# Patient Record
Sex: FEMALE | Race: WHITE | NOT HISPANIC OR LATINO | Employment: OTHER | URBAN - METROPOLITAN AREA
[De-identification: names, ages, dates, MRNs, and addresses within clinical notes are randomized per-mention and may not be internally consistent; named-entity substitution may affect disease eponyms.]

---

## 2018-05-01 ENCOUNTER — OFFICE VISIT (OUTPATIENT)
Dept: FAMILY MEDICINE CLINIC | Facility: CLINIC | Age: 58
End: 2018-05-01
Payer: COMMERCIAL

## 2018-05-01 VITALS
TEMPERATURE: 98.2 F | DIASTOLIC BLOOD PRESSURE: 86 MMHG | SYSTOLIC BLOOD PRESSURE: 132 MMHG | BODY MASS INDEX: 27.42 KG/M2 | WEIGHT: 149 LBS | HEIGHT: 62 IN | HEART RATE: 72 BPM | OXYGEN SATURATION: 99 %

## 2018-05-01 DIAGNOSIS — Z76.89 ENCOUNTER TO ESTABLISH CARE: Primary | ICD-10-CM

## 2018-05-01 DIAGNOSIS — Z13.220 LIPID SCREENING: ICD-10-CM

## 2018-05-01 DIAGNOSIS — E03.8 OTHER SPECIFIED HYPOTHYROIDISM: ICD-10-CM

## 2018-05-01 DIAGNOSIS — Z11.59 NEED FOR HEPATITIS C SCREENING TEST: ICD-10-CM

## 2018-05-01 DIAGNOSIS — Z12.11 SCREENING FOR COLON CANCER: ICD-10-CM

## 2018-05-01 PROCEDURE — 3008F BODY MASS INDEX DOCD: CPT | Performed by: FAMILY MEDICINE

## 2018-05-01 PROCEDURE — 99203 OFFICE O/P NEW LOW 30 MIN: CPT | Performed by: FAMILY MEDICINE

## 2018-05-01 RX ORDER — LEVOTHYROXINE SODIUM 0.07 MG/1
37.5 TABLET ORAL DAILY
Qty: 45 TABLET | Refills: 3 | Status: SHIPPED | OUTPATIENT
Start: 2018-05-01 | End: 2022-03-09 | Stop reason: SDUPTHER

## 2018-05-01 RX ORDER — LEVOTHYROXINE SODIUM 0.07 MG/1
37.5 TABLET ORAL
COMMUNITY
Start: 2016-07-28 | End: 2018-05-01 | Stop reason: SDUPTHER

## 2018-05-01 NOTE — PATIENT INSTRUCTIONS
Hypothyroidism   AMBULATORY CARE:   Hypothyroidism  is a condition that develops when the thyroid gland does not make enough thyroid hormone  Thyroid hormones help control body temperature, heart rate, growth, and weight  Common signs and symptoms include the following: The signs and symptoms may develop slowly, sometimes over several years  · Exhaustion    · Sensitivity to cold    · Headaches or decreased concentration    · Muscle aches or weakness    · Constipation     · Dry, flaky skin or brittle nails    · Thinning hair    · Heavy or irregular monthly periods    · Depression or irritability  Call 911 for any of the following:   · You have sudden chest pain or shortness of breath  · You have a seizure  · You feel like you are going to faint  Seek care immediately if:   · You have diarrhea, tremors, or trouble sleeping  · Your legs, ankles, or feet are swollen  Contact your healthcare provider if:   · You have a fever  · You have chills, a cough, or feel weak and achy  · You have pain and swelling in your muscles and joints  · Your skin is itchy, swollen, or you have a rash  · Your signs and symptoms return or get worse, even after treatment  · You have questions or concerns about your condition or care  Treatment:  Thyroid hormone replacement medicine may bring your thyroid hormone level back to normal  Ask your healthcare provider for more information on other medicines you may need  Follow up with your healthcare provider as directed: You may need to return for more blood tests to check your thyroid hormone level  This will show if you are getting the right amount of thyroid medicine  Write down your questions so you remember to ask them during your visits  © 2017 [employer]  Information is for End User's use only and may not be sold, redistributed or otherwise used for commercial purposes   All illustrations and images included in CareNotes® are the copyrighted property of [employer]  or [employer]  The above information is an  only  It is not intended as medical advice for individual conditions or treatments  Talk to your doctor, nurse or pharmacist before following any medical regimen to see if it is safe and effective for you

## 2018-05-01 NOTE — PROGRESS NOTES
Assessment/Plan:     Diagnoses and all orders for this visit:    Encounter to establish care    Other specified hypothyroidism  -     TSH, 3rd generation; Future  -     Lipid Panel with Direct LDL reflex; Future  -     Comprehensive metabolic panel; Future  -     levothyroxine 75 mcg tablet; Take 0 5 tablets (37 5 mcg total) by mouth daily    Lipid screening  -     Lipid Panel with Direct LDL reflex; Future  -     Comprehensive metabolic panel; Future    Screening for colon cancer  -     Ambulatory referral to Gastroenterology; Future    Need for hepatitis C screening test  -     Hepatitis C antibody; Future    Other orders  -     Discontinue: levothyroxine 75 mcg tablet; Take 37 5 mcg by mouth        Update labs  Continue current dose of thyroid medication  Referral for colonoscopy  Subjective:      Patient ID: Wilfrido Nicholson is a 56 y o  female  New patient to establish care  She has a history of hypothyroidism which was diagnosed years ago after her pregnancy  She is on 37 5 mcg of levothyroxine  She denies any symptoms of hypo or hyperthyroidism  She needs to have a colonoscopy  She had 1 years ago but she states she is due for another one  She sees a gynecologist for Pap smears and mammograms  Thyroid Problem   Presents for initial visit  Patient reports no anxiety, cold intolerance, constipation, diarrhea, fatigue, heat intolerance or palpitations  The symptoms have been stable  Past treatments include levothyroxine  There is no history of atrial fibrillation, dementia, diabetes, Graves' ophthalmopathy, heart failure, hyperlipidemia, neuropathy, obesity or osteopenia  There are no known risk factors  The following portions of the patient's history were reviewed and updated as appropriate: She  has a past medical history of Disease of thyroid gland    She   Patient Active Problem List    Diagnosis Date Noted    Lipid screening 05/01/2018    Need for hepatitis C screening test 05/01/2018    Other specified hypothyroidism 07/21/2016     She  has a past surgical history that includes Hysterectomy  Her family history includes Breast cancer in her mother; Cancer in her sister; Lymphoma in her brother  She  reports that she has quit smoking  Her smoking use included Cigarettes  She quit after 3 00 years of use  She has never used smokeless tobacco  She reports that she drinks about 1 2 oz of alcohol per week   She reports that she does not use drugs  Current Outpatient Prescriptions   Medication Sig Dispense Refill    levothyroxine 75 mcg tablet Take 0 5 tablets (37 5 mcg total) by mouth daily 45 tablet 3     No current facility-administered medications for this visit  No current outpatient prescriptions on file prior to visit  No current facility-administered medications on file prior to visit  She has No Known Allergies       Review of Systems   Constitutional: Negative for activity change, appetite change, chills, fatigue, fever and unexpected weight change  HENT: Negative for congestion, ear discharge, ear pain, postnasal drip, sinus pressure and sore throat  Eyes: Negative for discharge and visual disturbance  Respiratory: Negative for cough, shortness of breath and wheezing  Cardiovascular: Negative for chest pain, palpitations and leg swelling  Gastrointestinal: Negative for abdominal pain, constipation, diarrhea, nausea and vomiting  Endocrine: Negative for cold intolerance, heat intolerance, polydipsia and polyuria  Genitourinary: Negative for difficulty urinating and frequency  Musculoskeletal: Negative for arthralgias, back pain, joint swelling and myalgias  Skin: Negative for rash  Neurological: Negative for dizziness, weakness, light-headedness, numbness and headaches  Hematological: Negative for adenopathy  Psychiatric/Behavioral: Negative for behavioral problems, confusion, dysphoric mood, sleep disturbance and suicidal ideas   The patient is not nervous/anxious  Objective:      /86   Pulse 72   Temp 98 2 °F (36 8 °C)   Ht 5' 2" (1 575 m)   Wt 67 6 kg (149 lb)   SpO2 99%   BMI 27 25 kg/m²          Physical Exam   Constitutional: She is oriented to person, place, and time  She appears well-developed and well-nourished  No distress  HENT:   Head: Normocephalic and atraumatic  Eyes: Conjunctivae are normal  Pupils are equal, round, and reactive to light  Neck: Neck supple  No thyromegaly present  Cardiovascular: Normal rate, regular rhythm and normal heart sounds  Exam reveals no gallop and no friction rub  No murmur heard  Pulmonary/Chest: Effort normal and breath sounds normal  No respiratory distress  She has no wheezes  She has no rales  She exhibits no tenderness  Musculoskeletal: Normal range of motion  She exhibits no edema  Lymphadenopathy:     She has no cervical adenopathy  Neurological: She is alert and oriented to person, place, and time  Skin: Skin is warm and dry  No rash noted  She is not diaphoretic  Psychiatric: She has a normal mood and affect  Her behavior is normal  Judgment and thought content normal    Nursing note and vitals reviewed

## 2018-05-11 LAB
ALBUMIN SERPL-MCNC: 4.5 G/DL (ref 3.5–5.5)
ALBUMIN/GLOB SERPL: 2.1 {RATIO} (ref 1.2–2.2)
ALP SERPL-CCNC: 79 IU/L (ref 39–117)
ALT SERPL-CCNC: 31 IU/L (ref 0–32)
AMBIG ABBREV DEFAULT: NORMAL
AMBIG ABBREV DEFAULT: NORMAL
AST SERPL-CCNC: 31 IU/L (ref 0–40)
BILIRUB SERPL-MCNC: 0.7 MG/DL (ref 0–1.2)
BUN SERPL-MCNC: 17 MG/DL (ref 6–24)
BUN/CREAT SERPL: 23 (ref 9–23)
CALCIUM SERPL-MCNC: 9.3 MG/DL (ref 8.7–10.2)
CHLORIDE SERPL-SCNC: 99 MMOL/L (ref 96–106)
CHOLEST SERPL-MCNC: 201 MG/DL (ref 100–199)
CO2 SERPL-SCNC: 26 MMOL/L (ref 18–29)
CREAT SERPL-MCNC: 0.74 MG/DL (ref 0.57–1)
GLOBULIN SER-MCNC: 2.1 G/DL (ref 1.5–4.5)
GLUCOSE SERPL-MCNC: 86 MG/DL (ref 65–99)
HDLC SERPL-MCNC: 70 MG/DL
LDLC SERPL CALC-MCNC: 117 MG/DL (ref 0–99)
LDLC SERPL DIRECT ASSAY-MCNC: 126 MG/DL (ref 0–99)
POTASSIUM SERPL-SCNC: 3.9 MMOL/L (ref 3.5–5.2)
PROT SERPL-MCNC: 6.6 G/DL (ref 6–8.5)
SL AMB EGFR AFRICAN AMERICAN: 105 ML/MIN/1.73
SL AMB EGFR NON AFRICAN AMERICAN: 91 ML/MIN/1.73
SL AMB VLDL CHOLESTEROL CALC: 14 MG/DL (ref 5–40)
SODIUM SERPL-SCNC: 140 MMOL/L (ref 134–144)
TRIGL SERPL-MCNC: 68 MG/DL (ref 0–149)
TSH SERPL DL<=0.005 MIU/L-ACNC: 4.5 UIU/ML (ref 0.45–4.5)

## 2018-05-15 ENCOUNTER — OFFICE VISIT (OUTPATIENT)
Dept: OBGYN CLINIC | Age: 58
End: 2018-05-15
Payer: COMMERCIAL

## 2018-05-15 VITALS
BODY MASS INDEX: 25.03 KG/M2 | WEIGHT: 136 LBS | DIASTOLIC BLOOD PRESSURE: 72 MMHG | HEIGHT: 62 IN | SYSTOLIC BLOOD PRESSURE: 120 MMHG

## 2018-05-15 DIAGNOSIS — Z12.31 ENCOUNTER FOR SCREENING MAMMOGRAM FOR MALIGNANT NEOPLASM OF BREAST: ICD-10-CM

## 2018-05-15 DIAGNOSIS — Z01.419 ENCOUNTER FOR GYNECOLOGICAL EXAMINATION: Primary | ICD-10-CM

## 2018-05-15 PROCEDURE — 99386 PREV VISIT NEW AGE 40-64: CPT | Performed by: OBSTETRICS & GYNECOLOGY

## 2018-05-15 NOTE — PATIENT INSTRUCTIONS
Mammogram   AMBULATORY CARE:   What you need to know about a mammogram:  A mammogram is an x-ray of your breasts to screen for breast cancer  Experts recommend mammograms every 2 years starting at age 48 years  You may need a mammogram at age 52 years or younger if you have an increased risk for breast cancer  Talk to your healthcare provider about when you should start having mammograms and how often you need them  How to prepare for a mammogram:   · Do not use deodorant, powder, lotion, or perfume  These products may cause particles to appear on your mammogram      · Wear a 2-piece outfit  · If your breasts are tender before your monthly period, do not have a mammogram during this time  Schedule your mammogram to be done 1 week after your period ends  · If you are breastfeeding, express as much milk as possible before the mammogram     · Bring a list of the dates and places of your past mammograms and other breast tests or treatments  What will happen during a mammogram:  A top view and a side view x-ray are usually done for each breast  Tell healthcare providers if you have breast implants or breast problems before you have your mammogram  You may need extra x-rays of each breast   · You will be given a hospital gown  Take off your clothes from the waist up  Wear the hospital gown so that it opens in the front  · You will sit or stand next to a small x-ray machine  The healthcare provider will help you place one of your breasts on the x-ray plate  Your arm and breast will be moved until your breast is in the correct position  · Your breast will be gently pressed between 2 plastic plates for a few seconds while the x-ray is taken  This may be uncomfortable  · You will be asked to hold your breath while the x-ray is taken  Another x-ray will be taken of the same breast after the position of the x-ray machine has been changed  · Your other breast will be x-rayed the same way    What will happen after a mammogram:  Your breasts may feel tender for a short while after the mammogram  You may resume your regular activities  Ask your healthcare provider when you should receive the results of your mammogram   Risks of a mammogram:  You will be exposed to a small amount of radiation  Some breast cancers may not show up on mammograms  Contact your healthcare provider if:   · You cannot make your appointment on time  · You do not receive your results when expected  · You have questions or concerns about the mammogram   Follow up with your healthcare provider as directed:  Write down your questions so you remember to ask them during your visits  © 2017 [employer]  Information is for End User's use only and may not be sold, redistributed or otherwise used for commercial purposes  All illustrations and images included in CareNotes® are the copyrighted property of [employer]  or [employer]  The above information is an  only  It is not intended as medical advice for individual conditions or treatments  Talk to your doctor, nurse or pharmacist before following any medical regimen to see if it is safe and effective for you

## 2018-05-15 NOTE — PROGRESS NOTES
Assessment/Plan:    Encounter for gynecological examination  Pap not indicated  Mammogram ordered  GI referral given by PCP    Encourage healthy diet, exercise, Calcium 1200mg per day and at least 800 iu Vitamin D daily  Diagnoses and all orders for this visit:    Encounter for gynecological examination    Encounter for screening mammogram for malignant neoplasm of breast  -     Mammo screening bilateral w cad; Future          Subjective:      Patient ID: Krys Ravi is a 56 y o  female  Patient here for new patient yearly exam   Age of first period 18yrs old    lmp: hysterectomy ()  Last pap: 3/2017 per pt negative   Last mammo: 3/2017 per pt negative   Pt is not a smoker  Pt is social drinker  Pt does exersice    x 35 years  He has diabetes and parkinsons  Mora Silverman's mother  Gynecologic Exam   The patient's pertinent negatives include no genital itching, genital lesions, genital odor, genital rash, pelvic pain, vaginal bleeding or vaginal discharge  The patient is experiencing no pain  Pertinent negatives include no chills, constipation, diarrhea, fever, frequency, nausea, sore throat, urgency or vomiting  She is not sexually active  She is postmenopausal  Her past medical history is significant for a gynecological surgery  The following portions of the patient's history were reviewed and updated as appropriate:   She  has a past medical history of Arthritis; Disease of thyroid gland; Thyroid disease; and Uterus cancer ([facility] [place] [address] )  She   Patient Active Problem List    Diagnosis Date Noted    Encounter for gynecological examination 05/15/2018    Lipid screening 2018    Need for hepatitis C screening test 2018    Other specified hypothyroidism 2016     She  has a past surgical history that includes Hysterectomy    Her family history includes Breast cancer in her mother; Cancer in her sister; Lymphoma in her brother; Melanoma in her brother  She  reports that she has quit smoking  Her smoking use included Cigarettes  She quit after 3 00 years of use  She has never used smokeless tobacco  She reports that she drinks about 1 2 oz of alcohol per week   She reports that she does not use drugs  Current Outpatient Prescriptions   Medication Sig Dispense Refill    levothyroxine 75 mcg tablet Take 0 5 tablets (37 5 mcg total) by mouth daily (Patient taking differently: Take 35 mcg by mouth daily  ) 45 tablet 3     No current facility-administered medications for this visit  Current Outpatient Prescriptions on File Prior to Visit   Medication Sig    levothyroxine 75 mcg tablet Take 0 5 tablets (37 5 mcg total) by mouth daily (Patient taking differently: Take 35 mcg by mouth daily  )     No current facility-administered medications on file prior to visit  She has No Known Allergies       Review of Systems   Constitutional: Negative for activity change, appetite change, chills, fatigue and fever  HENT: Negative for rhinorrhea, sneezing and sore throat  Eyes: Negative for visual disturbance  Respiratory: Negative for cough, shortness of breath and wheezing  Cardiovascular: Negative for chest pain, palpitations and leg swelling  Gastrointestinal: Negative for abdominal distention, constipation, diarrhea, nausea and vomiting  Genitourinary: Negative for difficulty urinating, frequency, pelvic pain, urgency and vaginal discharge  Neurological: Negative for syncope and light-headedness  Objective:      /72 (BP Location: Right arm, Patient Position: Sitting, Cuff Size: Standard)   Ht 5' 2" (1 575 m)   Wt 61 7 kg (136 lb)   LMP  (LMP Unknown)   BMI 24 87 kg/m²          Physical Exam   Genitourinary: No breast swelling, tenderness, discharge or bleeding  There is no rash, tenderness, lesion or injury on the right labia  There is no rash, tenderness, lesion or injury on the left labia   Right adnexum displays no mass, no tenderness and no fullness  Left adnexum displays no mass, no tenderness and no fullness  No bleeding in the vagina  No vaginal discharge found

## 2018-05-15 NOTE — ASSESSMENT & PLAN NOTE
Pap not indicated  Mammogram ordered  GI referral given by PCP    Encourage healthy diet, exercise, Calcium 1200mg per day and at least 800 iu Vitamin D daily

## 2018-09-04 ENCOUNTER — HOSPITAL ENCOUNTER (OUTPATIENT)
Dept: RADIOLOGY | Facility: MEDICAL CENTER | Age: 58
Discharge: HOME | End: 2018-09-04
Payer: COMMERCIAL

## 2018-09-04 DIAGNOSIS — Z12.31 ENCOUNTER FOR SCREENING MAMMOGRAM FOR MALIGNANT NEOPLASM OF BREAST: ICD-10-CM

## 2018-09-04 PROCEDURE — 77067 SCR MAMMO BI INCL CAD: CPT

## 2019-10-08 ENCOUNTER — ANNUAL EXAM (OUTPATIENT)
Dept: OBGYN CLINIC | Facility: CLINIC | Age: 59
End: 2019-10-08
Payer: COMMERCIAL

## 2019-10-08 VITALS
WEIGHT: 137.13 LBS | HEIGHT: 62 IN | DIASTOLIC BLOOD PRESSURE: 80 MMHG | BODY MASS INDEX: 25.23 KG/M2 | SYSTOLIC BLOOD PRESSURE: 110 MMHG | HEART RATE: 69 BPM

## 2019-10-08 DIAGNOSIS — Z01.419 ENCOUNTER FOR GYNECOLOGICAL EXAMINATION: Primary | ICD-10-CM

## 2019-10-08 DIAGNOSIS — Z12.31 ENCOUNTER FOR SCREENING MAMMOGRAM FOR MALIGNANT NEOPLASM OF BREAST: ICD-10-CM

## 2019-10-08 DIAGNOSIS — R92.2 DENSE BREAST: ICD-10-CM

## 2019-10-08 DIAGNOSIS — Z12.11 SCREENING FOR COLON CANCER: ICD-10-CM

## 2019-10-08 PROCEDURE — 99396 PREV VISIT EST AGE 40-64: CPT | Performed by: OBSTETRICS & GYNECOLOGY

## 2019-10-08 NOTE — PROGRESS NOTES
Assessment/Plan:    No problem-specific Assessment & Plan notes found for this encounter  Diagnoses and all orders for this visit:    Encounter for gynecological examination    Encounter for screening mammogram for malignant neoplasm of breast  -     Mammo screening bilateral w 3d & cad; Future    Dense breast  -     Mammo screening bilateral w 3d & cad; Future          Subjective:      Patient ID: Osiris Wagoner is a 64 y o  female  Patient here for yearly exam  No current complaints at this time  Age of first period: 17years old    Lmp: pt had hysterectomy  Last mammo: 18 BR2 Benign  Last colonoscopy: had over 20 years ago per pt  referral printed for pt  Patient is a former smoker  Patient is a social drinker  Patient exercises   - Parkinson's    Parent's are local - Mother just diagnosed with Melanoma  Otis Deleon does her wound care  Gynecologic Exam   The patient's pertinent negatives include no genital itching, genital lesions, genital odor, genital rash, pelvic pain, vaginal bleeding or vaginal discharge  The patient is experiencing no pain  Pertinent negatives include no chills, constipation, diarrhea, fever, frequency, nausea, sore throat, urgency or vomiting  She is not sexually active  The following portions of the patient's history were reviewed and updated as appropriate:   She  has a past medical history of Arthritis, Disease of thyroid gland, Thyroid disease, and Uterus cancer ([facility] [address] )  She   Patient Active Problem List    Diagnosis Date Noted    Encounter for gynecological examination 05/15/2018    Lipid screening 2018    Need for hepatitis C screening test 2018    Other specified hypothyroidism 2016     She  has a past surgical history that includes Hysterectomy  Her family history includes Breast cancer in her mother; Cancer in her sister; Lymphoma in her brother; Melanoma in her brother  She  reports that she has quit smoking   Her smoking use included cigarettes  She quit after 3 00 years of use  She has never used smokeless tobacco  She reports that she drinks about 2 0 standard drinks of alcohol per week  She reports that she does not use drugs  Current Outpatient Medications   Medication Sig Dispense Refill    levothyroxine 75 mcg tablet Take 0 5 tablets (37 5 mcg total) by mouth daily (Patient taking differently: Take 35 mcg by mouth daily  ) 45 tablet 3     No current facility-administered medications for this visit  Current Outpatient Medications on File Prior to Visit   Medication Sig    levothyroxine 75 mcg tablet Take 0 5 tablets (37 5 mcg total) by mouth daily (Patient taking differently: Take 35 mcg by mouth daily  )     No current facility-administered medications on file prior to visit  She has No Known Allergies       Review of Systems   Constitutional: Negative for activity change, appetite change, chills, fatigue and fever  HENT: Negative for rhinorrhea, sneezing and sore throat  Eyes: Negative for visual disturbance  Respiratory: Negative for cough, shortness of breath and wheezing  Cardiovascular: Negative for chest pain, palpitations and leg swelling  Gastrointestinal: Negative for abdominal distention, constipation, diarrhea, nausea and vomiting  Genitourinary: Negative for difficulty urinating, frequency, pelvic pain, urgency and vaginal discharge  Neurological: Negative for syncope and light-headedness  Objective:      LMP  (LMP Unknown)   Breastfeeding? No          Physical Exam   Constitutional: She appears well-developed and well-nourished  No distress  Pulmonary/Chest: Right breast exhibits no inverted nipple, no mass, no nipple discharge, no skin change and no tenderness  Left breast exhibits no inverted nipple, no mass, no nipple discharge, no skin change and no tenderness  No breast tenderness, discharge or bleeding  Breasts are symmetrical    Abdominal: Soft  Normal appearance  There is no tenderness  There is no rigidity, no rebound and no guarding  Hernia confirmed negative in the right inguinal area and confirmed negative in the left inguinal area  Genitourinary: No breast tenderness, discharge or bleeding  There is no rash, tenderness, lesion or injury on the right labia  There is no rash, tenderness, lesion or injury on the left labia  Right adnexum displays no mass, no tenderness and no fullness  Left adnexum displays no mass, no tenderness and no fullness  No bleeding in the vagina  No vaginal discharge found  Genitourinary Comments: Urethral meatus: no lesions, non tender  Urethra: non tender    Vaginal mucosa atrophic   Lymphadenopathy:        Right: No inguinal adenopathy present  Left: No inguinal adenopathy present  Skin: Skin is warm and dry  No rash noted  She is not diaphoretic  No erythema  No pallor  Psychiatric: She has a normal mood and affect

## 2019-10-08 NOTE — ASSESSMENT & PLAN NOTE
Pap/HPV not indicated  Mammogram ordered  Colonoscopy referral given    Encourage healthy diet, exercise, Calcium 1200mg per day and at least 800 iu Vitamin D daily

## 2019-10-08 NOTE — PATIENT INSTRUCTIONS

## 2019-11-15 ENCOUNTER — TELEPHONE (OUTPATIENT)
Dept: GASTROENTEROLOGY | Facility: CLINIC | Age: 59
End: 2019-11-15

## 2019-11-15 NOTE — TELEPHONE ENCOUNTER
11/15/19  Screened by: Andra Silva    Referring Provider mariana khoury    Pre- Screening: There is no height or weight on file to calculate BMI  Has patient been referred for a routine screening Colonoscopy? yes  Is the patient between 39-70 years old? yes    SCHEDULING STAFF   If the patient is between 45yrs-49yrs, please advise patient to confirm benefits/coverage with their insurance company for a routine screening colonoscopy, some insurance carriers will only cover at [phone] or older   If the patient is over 66years old, please schedule an office visit  Do you have any of the following symptoms?  no    Have you had a coronary or vascular stent within the last year? no    Have you had a heart attack or stroke in the last 6 months? no    Have you had intestinal surgery in the last 3 months? no    Do you have problems with: no    Do you use: no    Have you been hospitalized in the last Month? no    Have you been diagnosed with a bleeding disorder or anemia? no    Have you had chest pain (angina) or breathing problems  (COPD) in the last 3 months? no    Do you have any difficulty walking up a flight of stairs? no    Have you had Kidney failure or insufficiency? no    Have you had heart valve surgery? no    Are you confined to a wheelchair? no    Do you take no    Have you been diagnosed with Diabetes or are you taking any   Diabetic medications? no    : If patient answers NO to medical questions, then schedule procedure  If patient answers YES to medical questions, then schedule office appointment  Previous Colonoscopy yes  Date and Facility of last colonoscopy?  20 years ago    Comments:

## 2019-12-04 ENCOUNTER — HOSPITAL ENCOUNTER (OUTPATIENT)
Dept: RADIOLOGY | Facility: MEDICAL CENTER | Age: 59
Discharge: HOME | End: 2019-12-04
Payer: COMMERCIAL

## 2019-12-04 VITALS — WEIGHT: 137 LBS | BODY MASS INDEX: 25.21 KG/M2 | HEIGHT: 62 IN

## 2019-12-04 DIAGNOSIS — R92.2 DENSE BREAST: ICD-10-CM

## 2019-12-04 DIAGNOSIS — Z12.31 ENCOUNTER FOR SCREENING MAMMOGRAM FOR MALIGNANT NEOPLASM OF BREAST: ICD-10-CM

## 2019-12-04 PROCEDURE — 77067 SCR MAMMO BI INCL CAD: CPT

## 2019-12-04 PROCEDURE — 77063 BREAST TOMOSYNTHESIS BI: CPT

## 2020-10-28 ENCOUNTER — ANNUAL EXAM (OUTPATIENT)
Dept: OBGYN CLINIC | Facility: CLINIC | Age: 60
End: 2020-10-28
Payer: COMMERCIAL

## 2020-10-28 VITALS
BODY MASS INDEX: 25.97 KG/M2 | SYSTOLIC BLOOD PRESSURE: 128 MMHG | TEMPERATURE: 98 F | WEIGHT: 142 LBS | DIASTOLIC BLOOD PRESSURE: 80 MMHG

## 2020-10-28 DIAGNOSIS — Z12.31 ENCOUNTER FOR SCREENING MAMMOGRAM FOR MALIGNANT NEOPLASM OF BREAST: ICD-10-CM

## 2020-10-28 DIAGNOSIS — Z01.419 ENCOUNTER FOR GYNECOLOGICAL EXAMINATION: Primary | ICD-10-CM

## 2020-10-28 PROCEDURE — S0612 ANNUAL GYNECOLOGICAL EXAMINA: HCPCS | Performed by: OBSTETRICS & GYNECOLOGY

## 2020-12-30 ENCOUNTER — HOSPITAL ENCOUNTER (OUTPATIENT)
Dept: MAMMOGRAPHY | Facility: HOSPITAL | Age: 60
Discharge: HOME | End: 2020-12-30
Payer: COMMERCIAL

## 2020-12-30 VITALS — WEIGHT: 138 LBS | HEIGHT: 62 IN | BODY MASS INDEX: 25.4 KG/M2

## 2020-12-30 DIAGNOSIS — Z12.31 ENCOUNTER FOR SCREENING MAMMOGRAM FOR MALIGNANT NEOPLASM OF BREAST: ICD-10-CM

## 2020-12-30 PROCEDURE — 77063 BREAST TOMOSYNTHESIS BI: CPT

## 2020-12-30 PROCEDURE — 77067 SCR MAMMO BI INCL CAD: CPT

## 2021-11-03 ENCOUNTER — ANNUAL EXAM (OUTPATIENT)
Dept: OBGYN CLINIC | Facility: CLINIC | Age: 61
End: 2021-11-03
Payer: COMMERCIAL

## 2021-11-03 VITALS — SYSTOLIC BLOOD PRESSURE: 122 MMHG | WEIGHT: 140.4 LBS | DIASTOLIC BLOOD PRESSURE: 80 MMHG | BODY MASS INDEX: 25.68 KG/M2

## 2021-11-03 DIAGNOSIS — Z01.419 ENCOUNTER FOR GYNECOLOGICAL EXAMINATION: ICD-10-CM

## 2021-11-03 DIAGNOSIS — Z12.31 SCREENING MAMMOGRAM, ENCOUNTER FOR: ICD-10-CM

## 2021-11-03 DIAGNOSIS — Z01.419 ENCOUNTER FOR ANNUAL ROUTINE GYNECOLOGICAL EXAMINATION: Primary | ICD-10-CM

## 2021-11-03 PROCEDURE — S0612 ANNUAL GYNECOLOGICAL EXAMINA: HCPCS | Performed by: OBSTETRICS & GYNECOLOGY

## 2022-01-03 ENCOUNTER — HOSPITAL ENCOUNTER (OUTPATIENT)
Dept: MAMMOGRAPHY | Facility: HOSPITAL | Age: 62
Discharge: HOME | End: 2022-01-03
Payer: COMMERCIAL

## 2022-01-03 VITALS — BODY MASS INDEX: 25.76 KG/M2 | HEIGHT: 62 IN | WEIGHT: 140 LBS

## 2022-01-03 DIAGNOSIS — Z12.31 SCREENING MAMMOGRAM, ENCOUNTER FOR: ICD-10-CM

## 2022-01-03 PROCEDURE — 77067 SCR MAMMO BI INCL CAD: CPT

## 2022-01-03 PROCEDURE — 77063 BREAST TOMOSYNTHESIS BI: CPT

## 2022-03-09 ENCOUNTER — OFFICE VISIT (OUTPATIENT)
Dept: FAMILY MEDICINE CLINIC | Facility: CLINIC | Age: 62
End: 2022-03-09
Payer: COMMERCIAL

## 2022-03-09 VITALS
BODY MASS INDEX: 25.76 KG/M2 | SYSTOLIC BLOOD PRESSURE: 130 MMHG | OXYGEN SATURATION: 97 % | HEIGHT: 62 IN | TEMPERATURE: 98.2 F | HEART RATE: 71 BPM | DIASTOLIC BLOOD PRESSURE: 80 MMHG | WEIGHT: 140 LBS

## 2022-03-09 DIAGNOSIS — Z13.220 SCREENING FOR LIPID DISORDERS: ICD-10-CM

## 2022-03-09 DIAGNOSIS — Z13.1 SCREENING FOR DIABETES MELLITUS (DM): Primary | ICD-10-CM

## 2022-03-09 DIAGNOSIS — E03.8 OTHER SPECIFIED HYPOTHYROIDISM: ICD-10-CM

## 2022-03-09 DIAGNOSIS — E03.9 HYPOTHYROIDISM, UNSPECIFIED TYPE: ICD-10-CM

## 2022-03-09 DIAGNOSIS — M06.9 RHEUMATOID ARTHRITIS INVOLVING BOTH HANDS, UNSPECIFIED WHETHER RHEUMATOID FACTOR PRESENT (HCC): ICD-10-CM

## 2022-03-09 PROCEDURE — 1036F TOBACCO NON-USER: CPT | Performed by: PHYSICIAN ASSISTANT

## 2022-03-09 PROCEDURE — 99203 OFFICE O/P NEW LOW 30 MIN: CPT | Performed by: PHYSICIAN ASSISTANT

## 2022-03-09 PROCEDURE — 3008F BODY MASS INDEX DOCD: CPT | Performed by: PHYSICIAN ASSISTANT

## 2022-03-09 PROCEDURE — 3725F SCREEN DEPRESSION PERFORMED: CPT | Performed by: PHYSICIAN ASSISTANT

## 2022-03-09 RX ORDER — LEVOTHYROXINE SODIUM 0.07 MG/1
75 TABLET ORAL DAILY
Qty: 30 TABLET | Refills: 2 | Status: SHIPPED | OUTPATIENT
Start: 2022-03-09 | End: 2022-05-10 | Stop reason: SDUPTHER

## 2022-03-09 NOTE — PROGRESS NOTES
BMI Counseling: Body mass index is 25 61 kg/m²  The BMI is above normal  Nutrition recommendations include reducing portion sizes, decreasing overall calorie intake and 3-5 servings of fruits/vegetables daily  Exercise recommendations include strength training exercises  Assessment/Plan:    No problem-specific Assessment & Plan notes found for this encounter  Diagnoses and all orders for this visit:    Screening for diabetes mellitus (DM)  -     Comprehensive metabolic panel; Future    Screening for lipid disorders  -     Lipid Panel with Direct LDL reflex; Future    Hypothyroidism, unspecified type  -     TSH, 3rd generation with Free T4 reflex; Future    Other specified hypothyroidism  -     levothyroxine 75 mcg tablet; Take 1 tablet (75 mcg total) by mouth daily    Rheumatoid arthritis involving both hands, unspecified whether rheumatoid factor present (HCC)  -     CBC and differential; Future          Subjective:      Patient ID: Joss Alvarado is a 63 y o  female  Joss Alvarado is a 63 y o  female with a medical history of hypothyroidism presents for follow up for labs and to re-establish care  Hx RA- CBD cream at night  Mostly hands now more the knees  Last saw a rheumatologist in 2005        Screenings:  Update labs  Mammogram- yearly and UTD  Cervical screen- patient has hx hysterectomy and has yearly exam  Colonoscopy- had 3 years ago (care gap request) will be due in 2 more years      The following portions of the patient's history were reviewed and updated as appropriate: allergies, past family history, past medical history, past social history, past surgical history and problem list     Review of Systems   Constitutional: Negative for chills, fatigue and fever  HENT: Negative for congestion, ear pain, sinus pain, sore throat and trouble swallowing  Eyes: Negative for pain, discharge and redness  Respiratory: Negative for cough, chest tightness, shortness of breath and wheezing  Cardiovascular: Negative for chest pain, palpitations and leg swelling  Gastrointestinal: Negative for abdominal pain, diarrhea, nausea and vomiting  Musculoskeletal: Negative for arthralgias, joint swelling and myalgias  Skin: Negative for rash  Neurological: Negative for dizziness, weakness, numbness and headaches  Objective:      /80 (BP Location: Left arm, Patient Position: Sitting, Cuff Size: Adult)   Pulse 71   Temp 98 2 °F (36 8 °C)   Ht 5' 2" (1 575 m)   Wt 63 5 kg (140 lb)   LMP  (LMP Unknown)   SpO2 97%   BMI 25 61 kg/m²          Physical Exam  Vitals and nursing note reviewed  Constitutional:       General: She is not in acute distress  Appearance: Normal appearance  She is well-developed  Cardiovascular:      Rate and Rhythm: Normal rate and regular rhythm  Pulmonary:      Effort: Pulmonary effort is normal       Breath sounds: Normal breath sounds  Abdominal:      General: Bowel sounds are normal       Palpations: Abdomen is soft  Abdomen is not rigid  Tenderness: There is no abdominal tenderness  There is no guarding or rebound  Negative signs include Durbin's sign and McBurney's sign  Hernia: No hernia is present  Skin:     General: Skin is warm and dry

## 2022-03-22 ENCOUNTER — APPOINTMENT (OUTPATIENT)
Dept: LAB | Facility: CLINIC | Age: 62
End: 2022-03-22
Payer: COMMERCIAL

## 2022-03-22 DIAGNOSIS — Z13.1 SCREENING FOR DIABETES MELLITUS (DM): ICD-10-CM

## 2022-03-22 DIAGNOSIS — M06.9 RHEUMATOID ARTHRITIS INVOLVING BOTH HANDS, UNSPECIFIED WHETHER RHEUMATOID FACTOR PRESENT (HCC): ICD-10-CM

## 2022-03-22 DIAGNOSIS — Z13.220 SCREENING FOR LIPID DISORDERS: ICD-10-CM

## 2022-03-22 DIAGNOSIS — E03.9 HYPOTHYROIDISM, UNSPECIFIED TYPE: ICD-10-CM

## 2022-03-22 LAB
ALBUMIN SERPL BCP-MCNC: 3.9 G/DL (ref 3.5–5)
ALP SERPL-CCNC: 78 U/L (ref 46–116)
ALT SERPL W P-5'-P-CCNC: 32 U/L (ref 12–78)
ANION GAP SERPL CALCULATED.3IONS-SCNC: 2 MMOL/L (ref 4–13)
AST SERPL W P-5'-P-CCNC: 23 U/L (ref 5–45)
BASOPHILS # BLD AUTO: 0.03 THOUSANDS/ΜL (ref 0–0.1)
BASOPHILS NFR BLD AUTO: 1 % (ref 0–1)
BILIRUB SERPL-MCNC: 0.66 MG/DL (ref 0.2–1)
BUN SERPL-MCNC: 15 MG/DL (ref 5–25)
CALCIUM SERPL-MCNC: 9.2 MG/DL (ref 8.3–10.1)
CHLORIDE SERPL-SCNC: 106 MMOL/L (ref 100–108)
CHOLEST SERPL-MCNC: 200 MG/DL
CO2 SERPL-SCNC: 30 MMOL/L (ref 21–32)
CREAT SERPL-MCNC: 0.68 MG/DL (ref 0.6–1.3)
EOSINOPHIL # BLD AUTO: 0.06 THOUSAND/ΜL (ref 0–0.61)
EOSINOPHIL NFR BLD AUTO: 1 % (ref 0–6)
ERYTHROCYTE [DISTWIDTH] IN BLOOD BY AUTOMATED COUNT: 12.4 % (ref 11.6–15.1)
GFR SERPL CREATININE-BSD FRML MDRD: 96 ML/MIN/1.73SQ M
GLUCOSE P FAST SERPL-MCNC: 86 MG/DL (ref 65–99)
HCT VFR BLD AUTO: 42.5 % (ref 34.8–46.1)
HDLC SERPL-MCNC: 61 MG/DL
HGB BLD-MCNC: 13.8 G/DL (ref 11.5–15.4)
IMM GRANULOCYTES # BLD AUTO: 0.01 THOUSAND/UL (ref 0–0.2)
IMM GRANULOCYTES NFR BLD AUTO: 0 % (ref 0–2)
LDLC SERPL CALC-MCNC: 127 MG/DL (ref 0–100)
LYMPHOCYTES # BLD AUTO: 1.39 THOUSANDS/ΜL (ref 0.6–4.47)
LYMPHOCYTES NFR BLD AUTO: 34 % (ref 14–44)
MCH RBC QN AUTO: 29.5 PG (ref 26.8–34.3)
MCHC RBC AUTO-ENTMCNC: 32.5 G/DL (ref 31.4–37.4)
MCV RBC AUTO: 91 FL (ref 82–98)
MONOCYTES # BLD AUTO: 0.38 THOUSAND/ΜL (ref 0.17–1.22)
MONOCYTES NFR BLD AUTO: 9 % (ref 4–12)
NEUTROPHILS # BLD AUTO: 2.28 THOUSANDS/ΜL (ref 1.85–7.62)
NEUTS SEG NFR BLD AUTO: 55 % (ref 43–75)
NRBC BLD AUTO-RTO: 0 /100 WBCS
PLATELET # BLD AUTO: 176 THOUSANDS/UL (ref 149–390)
PMV BLD AUTO: 10.4 FL (ref 8.9–12.7)
POTASSIUM SERPL-SCNC: 4.1 MMOL/L (ref 3.5–5.3)
PROT SERPL-MCNC: 7.3 G/DL (ref 6.4–8.2)
RBC # BLD AUTO: 4.68 MILLION/UL (ref 3.81–5.12)
SODIUM SERPL-SCNC: 138 MMOL/L (ref 136–145)
TRIGL SERPL-MCNC: 61 MG/DL
TSH SERPL DL<=0.05 MIU/L-ACNC: 1.99 UIU/ML (ref 0.36–3.74)
WBC # BLD AUTO: 4.15 THOUSAND/UL (ref 4.31–10.16)

## 2022-03-22 PROCEDURE — 80053 COMPREHEN METABOLIC PANEL: CPT

## 2022-03-22 PROCEDURE — 36415 COLL VENOUS BLD VENIPUNCTURE: CPT

## 2022-03-22 PROCEDURE — 80061 LIPID PANEL: CPT

## 2022-03-22 PROCEDURE — 85025 COMPLETE CBC W/AUTO DIFF WBC: CPT

## 2022-03-22 PROCEDURE — 84443 ASSAY THYROID STIM HORMONE: CPT

## 2022-05-10 DIAGNOSIS — E03.8 OTHER SPECIFIED HYPOTHYROIDISM: ICD-10-CM

## 2022-05-10 RX ORDER — LEVOTHYROXINE SODIUM 0.07 MG/1
75 TABLET ORAL DAILY
Qty: 90 TABLET | Refills: 1 | Status: SHIPPED | OUTPATIENT
Start: 2022-05-10 | End: 2022-05-11 | Stop reason: SDUPTHER

## 2022-05-11 DIAGNOSIS — E03.8 OTHER SPECIFIED HYPOTHYROIDISM: ICD-10-CM

## 2022-05-11 RX ORDER — LEVOTHYROXINE SODIUM 0.07 MG/1
75 TABLET ORAL DAILY
Qty: 90 TABLET | Refills: 1 | Status: SHIPPED | OUTPATIENT
Start: 2022-05-11 | End: 2022-08-02

## 2022-08-02 DIAGNOSIS — E03.8 OTHER SPECIFIED HYPOTHYROIDISM: ICD-10-CM

## 2022-08-02 RX ORDER — LEVOTHYROXINE SODIUM 0.07 MG/1
75 TABLET ORAL DAILY
Qty: 90 TABLET | Refills: 1 | Status: SHIPPED | OUTPATIENT
Start: 2022-08-02 | End: 2022-10-04 | Stop reason: SDUPTHER

## 2022-10-04 DIAGNOSIS — E03.8 OTHER SPECIFIED HYPOTHYROIDISM: ICD-10-CM

## 2022-10-04 RX ORDER — LEVOTHYROXINE SODIUM 0.07 MG/1
75 TABLET ORAL DAILY
Qty: 90 TABLET | Refills: 1 | Status: SHIPPED | OUTPATIENT
Start: 2022-10-04

## 2022-10-12 PROBLEM — Z01.419 ENCOUNTER FOR GYNECOLOGICAL EXAMINATION: Status: RESOLVED | Noted: 2018-05-15 | Resolved: 2022-10-12

## 2022-11-22 ENCOUNTER — ANNUAL EXAM (OUTPATIENT)
Dept: OBGYN CLINIC | Facility: CLINIC | Age: 62
End: 2022-11-22

## 2022-11-22 VITALS
WEIGHT: 141.8 LBS | BODY MASS INDEX: 26.09 KG/M2 | SYSTOLIC BLOOD PRESSURE: 118 MMHG | HEIGHT: 62 IN | DIASTOLIC BLOOD PRESSURE: 78 MMHG

## 2022-11-22 DIAGNOSIS — Z12.31 ENCOUNTER FOR SCREENING MAMMOGRAM FOR MALIGNANT NEOPLASM OF BREAST: ICD-10-CM

## 2022-11-22 DIAGNOSIS — Z01.419 ENCOUNTER FOR GYNECOLOGICAL EXAMINATION (GENERAL) (ROUTINE) WITHOUT ABNORMAL FINDINGS: Primary | ICD-10-CM

## 2022-11-22 DIAGNOSIS — Z11.51 SCREENING FOR HPV (HUMAN PAPILLOMAVIRUS): ICD-10-CM

## 2022-11-22 NOTE — PROGRESS NOTES
Assessment/Plan:    Encounter for gynecological examination (general) (routine) without abnormal findings  Pap/HPV collected  Mammogram ordered  Colonoscopy: discussed difficulty with finding care for   May try cologaurd    Encourage healthy diet, exercise, Calcium 1200mg per day and at least 800 iu Vitamin D daily  Diagnoses and all orders for this visit:    Encounter for gynecological examination (general) (routine) without abnormal findings  -     Liquid-based pap, screening    Screening for HPV (human papillomavirus)  -     Liquid-based pap, screening    Encounter for screening mammogram for malignant neoplasm of breast  -     Mammo screening bilateral w 3d & cad; Future          Subjective:      Patient ID: Keaton Gomez is a 63 y o  female  Patient presents for a routine annual visit  Menarche-14 Y/O  Last Pap Smear- 3/7/17-neg/neg Due today    Mammogram-1/3/22-normal  Colonoscopy- Per pt, had in 2018  Recall 2023 diverticuliti  [id number]  Non smoker  Social drinker  Currently sexually active  Mother and aunt with breast cancer  No concerns/questions for today's visit  Care for  with parkinsons  And parents live nearby  Gynecologic Exam  She reports no genital itching, genital lesions, genital odor, genital rash, pelvic pain, vaginal bleeding or vaginal discharge  The patient is experiencing no pain  Pertinent negatives include no chills, constipation, diarrhea, dysuria, fever, flank pain, frequency, headaches, hematuria, nausea, rash, sore throat, urgency or vomiting  She is sexually active  The following portions of the patient's history were reviewed and updated as appropriate:   She  has a past medical history of Arthritis, BRCA1 negative, Disease of thyroid gland, Endometrial cancer ([facility] [address] ) (1991), Thyroid disease, and Uterus cancer ([facility] [address] )    She   Patient Active Problem List    Diagnosis Date Noted   • Encounter for gynecological examination (general) (routine) without abnormal findings 11/22/2022   • Lipid screening 05/01/2018   • Need for hepatitis C screening test 05/01/2018   • Other specified hypothyroidism 07/21/2016     She  has a past surgical history that includes Hysterectomy  Her family history includes Breast cancer in her maternal aunt; Breast cancer (age of onset: 48) in her mother; Cancer in her sister; Colon cancer in her paternal uncle; Lymphoma in her brother; Melanoma (age of onset: 48) in her brother; No Known Problems in her daughter, father, maternal grandfather, maternal grandmother, paternal grandfather, paternal grandmother, and son; Skin cancer in her maternal aunt  She  reports that she has quit smoking  Her smoking use included cigarettes  She has never used smokeless tobacco  She reports current alcohol use of about 3 0 standard drinks per week  She reports that she does not use drugs  Current Outpatient Medications   Medication Sig Dispense Refill   • levothyroxine 75 mcg tablet Take 1 tablet (75 mcg total) by mouth daily 90 tablet 1     No current facility-administered medications for this visit  Current Outpatient Medications on File Prior to Visit   Medication Sig   • levothyroxine 75 mcg tablet Take 1 tablet (75 mcg total) by mouth daily     No current facility-administered medications on file prior to visit  She has No Known Allergies       Review of Systems   Constitutional: Negative for activity change, appetite change, chills, fatigue and fever  HENT: Negative for rhinorrhea, sneezing and sore throat  Eyes: Negative for visual disturbance  Respiratory: Negative for cough, shortness of breath and wheezing  Cardiovascular: Negative for chest pain, palpitations and leg swelling  Gastrointestinal: Negative for abdominal distention, constipation, diarrhea, nausea and vomiting  Genitourinary: Negative for difficulty urinating, dysuria, flank pain, frequency, hematuria, pelvic pain, urgency and vaginal discharge  Skin: Negative for rash  Neurological: Negative for syncope, light-headedness and headaches  Objective:      /78 (BP Location: Left arm, Patient Position: Sitting, Cuff Size: Standard)   Ht 5' 2" (1 575 m)   Wt 64 3 kg (141 lb 12 8 oz)   LMP  (LMP Unknown)   BMI 25 94 kg/m²          Physical Exam  Constitutional:       General: She is not in acute distress  Appearance: She is well-developed and well-nourished  She is not diaphoretic  Chest:   Breasts:  No discharge from either breast    No tenderness and bleeding  Breasts are symmetrical       Right: No inverted nipple, mass, nipple discharge, skin change or tenderness  Left: No inverted nipple, mass, nipple discharge, skin change or tenderness  Abdominal:      General: Bowel sounds are normal  There is no distension  Palpations: Abdomen is soft  There is no mass  Tenderness: There is no abdominal tenderness  There is no guarding or rebound  Genitourinary:     Labia:         Right: No rash, tenderness, lesion or injury  Left: No rash, tenderness, lesion or injury  Vagina: No vaginal discharge or bleeding  Cervix: No cervical motion tenderness, discharge or friability  Uterus: Not deviated, not enlarged, not fixed and not tender  Adnexa:         Right: No mass, tenderness or fullness  Left: No mass, tenderness or fullness  Comments: Urethral meatus- no lesions, non tender  Urethra non tender  Bladder non tender  Thin, atrophic vaginal mucosa  Skin:     General: Skin is warm and dry  Coloration: Skin is not pale  Findings: No erythema or rash     Psychiatric:         Mood and Affect: Mood and affect normal

## 2022-11-22 NOTE — ASSESSMENT & PLAN NOTE
Pap/HPV collected  Mammogram ordered  Colonoscopy: discussed difficulty with finding care for   May try cologaurd    Encourage healthy diet, exercise, Calcium 1200mg per day and at least 800 iu Vitamin D daily

## 2022-11-22 NOTE — PATIENT INSTRUCTIONS

## 2022-11-28 LAB
HPV HR 12 DNA CVX QL NAA+PROBE: NEGATIVE
HPV16 DNA CVX QL NAA+PROBE: NEGATIVE
HPV18 DNA CVX QL NAA+PROBE: NEGATIVE

## 2022-12-01 LAB
LAB AP GYN PRIMARY INTERPRETATION: NORMAL
Lab: NORMAL

## 2022-12-06 ENCOUNTER — VBI (OUTPATIENT)
Dept: ADMINISTRATIVE | Facility: OTHER | Age: 62
End: 2022-12-06

## 2023-01-06 ENCOUNTER — APPOINTMENT (OUTPATIENT)
Dept: LAB | Facility: CLINIC | Age: 63
End: 2023-01-06

## 2023-01-06 ENCOUNTER — OFFICE VISIT (OUTPATIENT)
Dept: FAMILY MEDICINE CLINIC | Facility: CLINIC | Age: 63
End: 2023-01-06

## 2023-01-06 VITALS
HEIGHT: 62 IN | SYSTOLIC BLOOD PRESSURE: 124 MMHG | DIASTOLIC BLOOD PRESSURE: 92 MMHG | BODY MASS INDEX: 25.58 KG/M2 | HEART RATE: 70 BPM | OXYGEN SATURATION: 99 % | TEMPERATURE: 97.9 F | WEIGHT: 139 LBS

## 2023-01-06 DIAGNOSIS — Z12.11 SCREEN FOR COLON CANCER: ICD-10-CM

## 2023-01-06 DIAGNOSIS — Z13.220 SCREENING, LIPID: ICD-10-CM

## 2023-01-06 DIAGNOSIS — E03.8 OTHER SPECIFIED HYPOTHYROIDISM: Primary | ICD-10-CM

## 2023-01-06 DIAGNOSIS — F41.9 ANXIETY: ICD-10-CM

## 2023-01-06 DIAGNOSIS — E03.8 OTHER SPECIFIED HYPOTHYROIDISM: ICD-10-CM

## 2023-01-06 LAB
ALBUMIN SERPL BCP-MCNC: 4 G/DL (ref 3.5–5)
ALP SERPL-CCNC: 77 U/L (ref 46–116)
ALT SERPL W P-5'-P-CCNC: 31 U/L (ref 12–78)
ANION GAP SERPL CALCULATED.3IONS-SCNC: 5 MMOL/L (ref 4–13)
AST SERPL W P-5'-P-CCNC: 23 U/L (ref 5–45)
BILIRUB SERPL-MCNC: 0.79 MG/DL (ref 0.2–1)
BUN SERPL-MCNC: 16 MG/DL (ref 5–25)
CALCIUM SERPL-MCNC: 9.5 MG/DL (ref 8.3–10.1)
CHLORIDE SERPL-SCNC: 105 MMOL/L (ref 96–108)
CHOLEST SERPL-MCNC: 189 MG/DL
CO2 SERPL-SCNC: 29 MMOL/L (ref 21–32)
CREAT SERPL-MCNC: 0.74 MG/DL (ref 0.6–1.3)
GFR SERPL CREATININE-BSD FRML MDRD: 88 ML/MIN/1.73SQ M
GLUCOSE P FAST SERPL-MCNC: 101 MG/DL (ref 65–99)
HDLC SERPL-MCNC: 66 MG/DL
LDLC SERPL CALC-MCNC: 106 MG/DL (ref 0–100)
NONHDLC SERPL-MCNC: 123 MG/DL
POTASSIUM SERPL-SCNC: 4.2 MMOL/L (ref 3.5–5.3)
PROT SERPL-MCNC: 7.2 G/DL (ref 6.4–8.4)
SODIUM SERPL-SCNC: 139 MMOL/L (ref 135–147)
TRIGL SERPL-MCNC: 84 MG/DL
TSH SERPL DL<=0.05 MIU/L-ACNC: 3.93 UIU/ML (ref 0.45–4.5)

## 2023-01-06 RX ORDER — SERTRALINE HYDROCHLORIDE 25 MG/1
25 TABLET, FILM COATED ORAL DAILY
Qty: 90 TABLET | Refills: 3 | Status: SHIPPED | OUTPATIENT
Start: 2023-01-06

## 2023-01-06 RX ORDER — LEVOTHYROXINE SODIUM 0.07 MG/1
75 TABLET ORAL DAILY
Qty: 90 TABLET | Refills: 1 | Status: SHIPPED | OUTPATIENT
Start: 2023-01-06

## 2023-01-06 NOTE — PROGRESS NOTES
Name: Dahlia Henson      : 1960      MRN: [id number]  Encounter Provider: Fabián Solano PA-C  Encounter Date: 2023   Encounter department: [address] [place] [address]     1  Other specified hypothyroidism  -     levothyroxine 75 mcg tablet; Take 1 tablet (75 mcg total) by mouth daily  -     Comprehensive metabolic panel; Future    2  Screen for colon cancer  -     Ambulatory referral for colonoscopy; Future  -     TSH, 3rd generation with Free T4 reflex; Future    3  Screening, lipid  -     Lipid panel; Future    4  Anxiety  -     sertraline (Zoloft) 25 mg tablet; Take 1 tablet (25 mg total) by mouth daily        DW patient option for SSRI vs short acting opted for SSRI  DW patient all AE of medication    Subjective      Patient  hospitalized and she is having some difficulty with situational anxiety   Has great family support at home       Due for colonoscopy- had one 5 years ago with noted polyps   Cervical screen UTD   Mammogram scheduled   Will update labs    Review of Systems   Constitutional: Negative for chills, fatigue and fever  HENT: Negative for congestion, ear pain, sinus pain, sore throat and trouble swallowing  Eyes: Negative for pain, discharge and redness  Respiratory: Negative for cough, chest tightness, shortness of breath and wheezing  Cardiovascular: Negative for chest pain, palpitations and leg swelling  Gastrointestinal: Negative for abdominal pain, diarrhea, nausea and vomiting  Musculoskeletal: Negative for arthralgias, joint swelling and myalgias  Skin: Negative for rash  Neurological: Negative for dizziness, weakness, numbness and headaches  Psychiatric/Behavioral: Negative for dysphoric mood, self-injury and suicidal ideas  The patient is nervous/anxious          Current Outpatient Medications on File Prior to Visit   Medication Sig   • [DISCONTINUED] levothyroxine 75 mcg tablet Take 1 tablet (75 mcg total) by mouth daily       Objective     /92 (BP Location: Left arm, Patient Position: Sitting, Cuff Size: Adult)   Pulse 70   Temp 97 9 °F (36 6 °C)   Ht 5' 2" (1 575 m)   Wt 63 kg (139 lb)   LMP  (LMP Unknown)   SpO2 99%   BMI 25 42 kg/m²     Physical Exam  Vitals and nursing note reviewed  Constitutional:       General: She is not in acute distress  Appearance: Normal appearance  She is well-developed  Cardiovascular:      Rate and Rhythm: Normal rate and regular rhythm  Pulmonary:      Effort: Pulmonary effort is normal       Breath sounds: Normal breath sounds  Abdominal:      General: Bowel sounds are normal       Palpations: Abdomen is soft  Abdomen is not rigid  Tenderness: There is no abdominal tenderness  There is no guarding or rebound  Negative signs include Durbin's sign and McBurney's sign  Hernia: No hernia is present  Skin:     General: Skin is warm and dry         Sridevi Johnson PA-C

## 2023-01-13 ENCOUNTER — HOSPITAL ENCOUNTER (OUTPATIENT)
Dept: MAMMOGRAPHY | Facility: HOSPITAL | Age: 63
End: 2023-01-13

## 2023-01-13 VITALS — BODY MASS INDEX: 24.84 KG/M2 | HEIGHT: 62 IN | WEIGHT: 135 LBS

## 2023-01-13 DIAGNOSIS — Z12.31 ENCOUNTER FOR SCREENING MAMMOGRAM FOR MALIGNANT NEOPLASM OF BREAST: ICD-10-CM

## 2023-01-27 ENCOUNTER — HOSPITAL ENCOUNTER (OUTPATIENT)
Dept: MAMMOGRAPHY | Facility: CLINIC | Age: 63
End: 2023-01-27

## 2023-01-27 ENCOUNTER — HOSPITAL ENCOUNTER (OUTPATIENT)
Dept: ULTRASOUND IMAGING | Facility: CLINIC | Age: 63
Discharge: HOME | End: 2023-01-27

## 2023-01-27 VITALS — WEIGHT: 135 LBS | HEIGHT: 62 IN | BODY MASS INDEX: 24.84 KG/M2

## 2023-01-27 DIAGNOSIS — R92.8 ABNORMAL MAMMOGRAM: ICD-10-CM

## 2023-02-07 ENCOUNTER — TELEMEDICINE (OUTPATIENT)
Dept: FAMILY MEDICINE CLINIC | Facility: CLINIC | Age: 63
End: 2023-02-07

## 2023-02-07 DIAGNOSIS — F41.9 ANXIETY: Primary | ICD-10-CM

## 2023-02-07 RX ORDER — FLUOXETINE 10 MG/1
10 TABLET, FILM COATED ORAL DAILY
Qty: 30 TABLET | Refills: 2 | Status: SHIPPED | OUTPATIENT
Start: 2023-02-07

## 2023-02-07 NOTE — PROGRESS NOTES
Virtual Regular Visit    Verification of patient location:    Patient is located in the following state in which I hold an active license [place]      Assessment/Plan:    Problem List Items Addressed This Visit    None  Visit Diagnoses     Anxiety    -  Primary    Relevant Medications    FLUoxetine (PROzac) 10 MG tablet        Switch from zoloft to prozac   F/u 2-3 weeks       Reason for visit is   Chief Complaint   Patient presents with   • Virtual Regular Visit        Encounter provider Esequiel Huitron PA-C    Provider located at [place] [address]  [address] [place] [id number]      Recent Visits  No visits were found meeting these conditions  Showing recent visits within past 7 days and meeting all other requirements  Today's Visits  Date Type Provider Dept   02/07/23 Telemedicine [place] Pepito Johnson PA-C [facility]   Showing today's visits and meeting all other requirements  Future Appointments  No visits were found meeting these conditions  Showing future appointments within next 150 days and meeting all other requirements       The patient was identified by name and date of birth  Juana Gillis was informed that this is a telemedicine visit and that the visit is being conducted through the [employer]  She agrees to proceed     My office door was closed  No one else was in the room  She acknowledged consent and understanding of privacy and security of the video platform  The patient has agreed to participate and understands they can discontinue the visit at any time  Patient is aware this is a billable service  Subjective  Juana Gillis is a 63 y o  female follow up   Pt presents for follow up on zoloft  Pt has been on zoloft for anxiety for about 1 month  She has been relating severe headaches to the recent zoloft use  She tried to not take the zoloft last night and reports no headache today  we discussed this is a common side effect  Will consider switching SSRI to another  She has had some improvement in her anxiety  Also notes her  is home from the hospital   Denies any other new mood changes       Past Medical History:   Diagnosis Date   • Arthritis    • BRCA1 negative    • Disease of thyroid gland    • Endometrial cancer ([facility] [address] ) 1991   • Thyroid disease    • Uterus cancer ([facility] [address] )        Past Surgical History:   Procedure Laterality Date   • HYSTERECTOMY      still has ovaries       Current Outpatient Medications   Medication Sig Dispense Refill   • FLUoxetine (PROzac) 10 MG tablet Take 1 tablet (10 mg total) by mouth daily 30 tablet 2   • levothyroxine 75 mcg tablet Take 1 tablet (75 mcg total) by mouth daily 90 tablet 1     No current facility-administered medications for this visit  No Known Allergies    Review of Systems   Constitutional: Negative for chills, fatigue and fever  HENT: Negative for congestion, ear pain, sinus pain, sore throat and trouble swallowing  Eyes: Negative for pain, discharge and redness  Respiratory: Negative for cough, chest tightness, shortness of breath and wheezing  Cardiovascular: Negative for chest pain, palpitations and leg swelling  Gastrointestinal: Negative for abdominal pain, diarrhea, nausea and vomiting  Musculoskeletal: Negative for arthralgias, joint swelling and myalgias  Skin: Negative for rash  Neurological: Negative for dizziness, weakness, numbness and headaches  Psychiatric/Behavioral: The patient is nervous/anxious  Video Exam    There were no vitals filed for this visit  Physical Exam  Constitutional:       General: She is not in acute distress  Neurological:      Mental Status: She is alert  Psychiatric:         Attention and Perception: Attention normal          Mood and Affect: Mood normal          Speech: Speech normal          Behavior: Behavior normal          Thought Content:  Thought content normal          Cognition and Memory: Cognition normal          Judgment: Judgment normal           I spent 7 minutes directly with the patient during this visit

## 2023-04-04 DIAGNOSIS — E03.8 OTHER SPECIFIED HYPOTHYROIDISM: ICD-10-CM

## 2023-04-04 RX ORDER — LEVOTHYROXINE SODIUM 0.07 MG/1
75 TABLET ORAL DAILY
Qty: 90 TABLET | Refills: 1 | Status: SHIPPED | OUTPATIENT
Start: 2023-04-04

## 2023-04-06 DIAGNOSIS — F41.9 ANXIETY: ICD-10-CM

## 2023-04-06 RX ORDER — FLUOXETINE 10 MG/1
10 TABLET, FILM COATED ORAL DAILY
Qty: 30 TABLET | Refills: 2 | Status: SHIPPED | OUTPATIENT
Start: 2023-04-06

## 2023-04-06 NOTE — TELEPHONE ENCOUNTER
Left a message to call the office to set up appt with a different provider - Irene Alvarez no longer here rb   Also sent message via [employer] rb

## 2023-04-26 ENCOUNTER — OFFICE VISIT (OUTPATIENT)
Dept: FAMILY MEDICINE CLINIC | Facility: CLINIC | Age: 63
End: 2023-04-26

## 2023-04-26 VITALS
TEMPERATURE: 98.4 F | HEART RATE: 67 BPM | HEIGHT: 62 IN | WEIGHT: 140 LBS | BODY MASS INDEX: 25.76 KG/M2 | SYSTOLIC BLOOD PRESSURE: 136 MMHG | OXYGEN SATURATION: 98 % | DIASTOLIC BLOOD PRESSURE: 70 MMHG

## 2023-04-26 DIAGNOSIS — Z13.1 SCREENING FOR DIABETES MELLITUS: ICD-10-CM

## 2023-04-26 DIAGNOSIS — F41.9 ANXIETY: Primary | ICD-10-CM

## 2023-04-26 DIAGNOSIS — Z13.220 SCREENING FOR LIPID DISORDERS: ICD-10-CM

## 2023-04-26 DIAGNOSIS — E03.8 OTHER SPECIFIED HYPOTHYROIDISM: ICD-10-CM

## 2023-04-26 RX ORDER — FLUOXETINE 10 MG/1
10 CAPSULE ORAL DAILY
Qty: 90 CAPSULE | Refills: 1 | Status: SHIPPED | OUTPATIENT
Start: 2023-04-26 | End: 2023-10-23

## 2023-04-26 NOTE — PROGRESS NOTES
Name: Zohreh Landaverde      : 1960      MRN: [id number]  Encounter Provider: Gertha Sacks, PA-C  Encounter Date: 2023   Encounter department: [address] [place] [address]     1  Anxiety  -     FLUoxetine (PROzac) 10 mg capsule; Take 1 capsule (10 mg total) by mouth daily    2  Other specified hypothyroidism  -     TSH, 3rd generation with Free T4 reflex; Future    3  Screening for lipid disorders  -     Lipid Panel with Direct LDL reflex; Future    4  Screening for diabetes mellitus  -     Basic metabolic panel; Future  Patient's mood is currently stable on Prozac 10 mg PO daily  She reports no known side effects at this time and is content with her current dosage  Physical examination findings unremarkable  Return to annual routine follow ups, or sooner as needed  Subjective     The patient presents for follow up for medication refill  She is currently taking Prozac 10 mg for anxiety  Her mood has been stable and she reports no increased thoughts of suicide, harming herself, or others  She is tolerating the Prozac well and reports no known side effects  She believes her current dosage is working great for her and she does not think she needs to adjust the dosing at this time  Review of Systems   Constitutional: Negative for chills, diaphoresis, fatigue and fever  HENT: Negative for congestion, ear pain, hearing loss, rhinorrhea, sinus pressure, sneezing and sore throat  Eyes: Negative for pain, discharge, itching and visual disturbance  Respiratory: Negative for cough, chest tightness, shortness of breath and wheezing  Cardiovascular: Negative for chest pain, palpitations and leg swelling  Gastrointestinal: Negative for abdominal pain, blood in stool, constipation, diarrhea, nausea and vomiting  Genitourinary: Negative for dysuria, frequency, hematuria and urgency  Neurological: Negative for dizziness, light-headedness and numbness  Psychiatric/Behavioral: Negative for sleep disturbance  Past Medical History:   Diagnosis Date   • Arthritis    • BRCA1 negative    • Disease of thyroid gland    • Endometrial cancer ([facility] [address] ) 1991   • Thyroid disease    • Uterus cancer ([facility] [address] )      Past Surgical History:   Procedure Laterality Date   • HYSTERECTOMY      still has ovaries     Family History   Problem Relation Age of Onset   • Breast cancer Mother 48   • No Known Problems Father    • Cancer Sister    • Lymphoma Brother    • Melanoma Brother 48   • No Known Problems Daughter    • No Known Problems Maternal Grandmother    • No Known Problems Maternal Grandfather    • No Known Problems Paternal Grandmother    • No Known Problems Paternal Grandfather    • No Known Problems Son    • Colon cancer Paternal Uncle    • Skin cancer Maternal Aunt    • Breast cancer Maternal Aunt      Social History     Socioeconomic History   • Marital status: /Civil Union     Spouse name: None   • Number of children: None   • Years of education: None   • Highest education level: None   Occupational History   • None   Tobacco Use   • Smoking status: Former     Years: 3 00     Types: Cigarettes   • Smokeless tobacco: Never   Vaping Use   • Vaping Use: Never used   Substance and Sexual Activity   • Alcohol use:  Yes     Alcohol/week: 3 0 standard drinks     Types: 3 Glasses of wine per week     Comment: social   • Drug use: No   • Sexual activity: Not Currently     Partners: Male     Birth control/protection: Surgical   Other Topics Concern   • None   Social History Narrative   • None     Social Determinants of Health     Financial Resource Strain: Not on file   Food Insecurity: Not on file   Transportation Needs: Not on file   Physical Activity: Not on file   Stress: Not on file   Social Connections: Not on file   Intimate Partner Violence: Not on file   Housing Stability: Not on file     Current Outpatient Medications on File Prior to Visit   Medication Sig   • "levothyroxine 75 mcg tablet TAKE 1 TABLET (75 MCG TOTAL) BY MOUTH DAILY   • [DISCONTINUED] FLUoxetine (PROzac) 10 MG tablet TAKE 1 TABLET (10 MG TOTAL) BY MOUTH DAILY     No Known Allergies  Immunization History   Administered Date(s) Administered   • COVID-19 PFIZER VACCINE 0 3 ML IM 05/19/2021, 06/09/2021, 01/11/2022       Objective     /70 (BP Location: Left arm, Patient Position: Sitting, Cuff Size: Adult)   Pulse 67   Temp 98 4 °F (36 9 °C)   Ht 5' 2\" (1 575 m)   Wt 63 5 kg (140 lb)   LMP  (LMP Unknown)   SpO2 98%   BMI 25 61 kg/m²     Physical Exam  Vitals and nursing note reviewed  Constitutional:       General: She is not in acute distress  Appearance: Normal appearance  She is not ill-appearing or diaphoretic  HENT:      Head: Normocephalic and atraumatic  Nose: Nose normal       Mouth/Throat:      Mouth: Mucous membranes are moist    Cardiovascular:      Rate and Rhythm: Normal rate and regular rhythm  Heart sounds: Normal heart sounds  No murmur heard  Pulmonary:      Effort: Pulmonary effort is normal  No respiratory distress  Breath sounds: Normal breath sounds  No wheezing  Musculoskeletal:         General: Normal range of motion  Cervical back: Normal range of motion and neck supple  Skin:     General: Skin is warm and dry  Capillary Refill: Capillary refill takes less than 2 seconds  Neurological:      Mental Status: She is alert and oriented to person, place, and time     Psychiatric:         Mood and Affect: Mood and affect normal        Rufino Negron PA-C  "

## 2023-07-20 DIAGNOSIS — F41.9 ANXIETY: ICD-10-CM

## 2023-07-20 RX ORDER — FLUOXETINE 10 MG/1
10 CAPSULE ORAL DAILY
Qty: 90 CAPSULE | Refills: 1 | Status: SHIPPED | OUTPATIENT
Start: 2023-07-20 | End: 2024-01-16

## 2023-08-30 ENCOUNTER — TELEPHONE (OUTPATIENT)
Dept: ADMINISTRATIVE | Facility: OTHER | Age: 63
End: 2023-08-30

## 2023-08-30 NOTE — TELEPHONE ENCOUNTER
Upon review of the In Basket request we have found/obtained the documentation. After careful review of the document we are unable to complete this request for CRC: Colonoscopy because the documentation does not have the result(s) needed to close the requested care gap(s). Any additional questions or concerns should be emailed to the Practice Liaisons via the appropriate education email address, please do not reply via In Basket.     Thank you  Fabiana Cole MA

## 2023-08-30 NOTE — LETTER
Procedure Request Form: Colonoscopy      Date Requested: 23  Patient: Versa Fraise  Patient : 1960   Referring Provider: Corrinne Platt, PA-C   2nd Request        Date of Procedure _______2023 report_______________________       The above patient has informed us that they have completed their   most recent Colonoscopy at your facility. Please complete   this form and attach all corresponding procedure reports/results. Comments __________________________________________________________  ____________________________________________________________________  ____________________________________________________________________  ____________________________________________________________________    Facility Completing Procedure _________________________________________    Form Completed By (print name) _______________________________________      Signature __________________________________________________________      These reports are needed for  compliance. Please fax this completed form and a copy of the procedure report to our office located at [address] [place] [address] as soon as possible to Fax [phone] attention Bonita Hansen: Phone [phone]    We thank you for your assistance in treating our mutual patient.

## 2023-08-30 NOTE — TELEPHONE ENCOUNTER
----- Message from Rico Kahn sent at 8/29/2023  1:35 PM EDT -----  Regarding: [employer] Request  08/29/23 1:35 PM    Hello, our patient Courtney Patton has had CRC: Colonoscopy completed/performed. Please assist in updating the patient chart by pulling the document from the Media Tab. The date of service is 08/26/2023.      Thank you,  Rico BENNETT

## 2023-08-30 NOTE — TELEPHONE ENCOUNTER
Upon review of the In Basket request and the patient's chart, initial outreach has been made via fax to facility. Please see Contacts section for details.      Thank you  Beverly Barreto MA

## 2023-08-30 NOTE — LETTER
Procedure Request Form: Colonoscopy      Date Requested: 23  Patient: Ramiro Galeanato  Patient : 1960   Referring Provider: Elizabeth Wilder PA-C        Date of Procedure _________2023 report_____________________       The above patient has informed us that they have completed their   most recent Colonoscopy at your facility. Please complete   this form and attach all corresponding procedure reports/results. Comments __________________________________________________________  ____________________________________________________________________  ____________________________________________________________________  ____________________________________________________________________    Facility Completing Procedure _________________________________________    Form Completed By (print name) _______________________________________      Signature __________________________________________________________      These reports are needed for  compliance. Please fax this completed form and a copy of the procedure report to our office located at [address] [place] [address] as soon as possible to Fax [phone] attention Trent Nereida: Phone [phone]    We thank you for your assistance in treating our mutual patient.

## 2023-09-05 NOTE — TELEPHONE ENCOUNTER
As a follow-up, a second attempt has been made for outreach via fax to facility. Please see Contacts section for details.     Thank you  Mitch Romero MA

## 2023-09-08 NOTE — TELEPHONE ENCOUNTER
As a final attempt, a third outreach has been made via telephone call to facility. Please see Contacts section for details. This encounter will be closed and completed by end of day. Should we receive the requested information because of previous outreach attempts, the requested patient's chart will be updated appropriately.      Thank you  Michael Sanchez MA

## 2023-09-15 DIAGNOSIS — E03.8 OTHER SPECIFIED HYPOTHYROIDISM: ICD-10-CM

## 2023-09-15 RX ORDER — LEVOTHYROXINE SODIUM 0.07 MG/1
75 TABLET ORAL DAILY
Qty: 90 TABLET | Refills: 1 | Status: SHIPPED | OUTPATIENT
Start: 2023-09-15

## 2023-12-04 NOTE — PROGRESS NOTES
Assessment/Plan:    Encounter for gynecological examination (general) (routine) without abnormal findings  Pap/HPV current  Mammogram ordered   Colonoscopy current     Encourage healthy diet, exercise, Calcium 1200mg per day and at least 800 iu Vitamin D daily. Diagnoses and all orders for this visit:    Encounter for annual routine gynecological examination    Encounter for screening mammogram for breast cancer  -     Mammo screening bilateral w 3d & cad; Future    Encounter for gynecological examination (general) (routine) without abnormal findings          Subjective:      Patient ID: Eulalia Vega is a 66 y.o. female. Patient presents for a routine annual visit  Menarche- 14Y/O  Last Pap Smear- 22 neg/neg    Mammogram-23--needed f/u of R breast for asymmetry  -diag mammo 23 normal  Colonoscopy-per pt, had 23. Needs every 2 years    Former smoker  Social drinker  Currently not sexually active  Mother and maternal aunt with breast cancer. Paternal uncle with colon cancer    No concerns/questions for today's visit    Gynecologic Exam  She reports no genital itching, genital lesions, genital odor, genital rash, pelvic pain, vaginal bleeding or vaginal discharge. The patient is experiencing no pain. Pertinent negatives include no chills, constipation, diarrhea, fever, nausea, sore throat or vomiting. The following portions of the patient's history were reviewed and updated as appropriate: She  has a past medical history of Arthritis, BRCA1 negative, Disease of thyroid gland, Endometrial cancer ([address]) (), Thyroid disease, and Uterus cancer ([address]).   She   Patient Active Problem List    Diagnosis Date Noted    Anxiety 2023    Encounter for gynecological examination (general) (routine) without abnormal findings 2022    Lipid screening 2018    Need for hepatitis C screening test 2018    Other specified hypothyroidism 2016     She  has a past surgical history that includes Hysterectomy. Her family history includes Breast cancer in her maternal aunt; Breast cancer (age of onset: 48) in her mother; Cancer in her sister; Colon cancer in her paternal uncle; Lymphoma in her brother; Melanoma (age of onset: 48) in her brother; No Known Problems in her daughter, father, maternal grandfather, maternal grandmother, paternal grandfather, paternal grandmother, and son; Skin cancer in her maternal aunt. She  reports that she has quit smoking. Her smoking use included cigarettes. She has never used smokeless tobacco. She reports current alcohol use of about 3.0 standard drinks of alcohol per week. She reports that she does not use drugs. Current Outpatient Medications   Medication Sig Dispense Refill    FLUoxetine (PROzac) 10 mg capsule TAKE 1 CAPSULE (10 MG TOTAL) BY MOUTH DAILY 90 capsule 1    levothyroxine 75 mcg tablet Take 1 tablet (75 mcg total) by mouth daily 90 tablet 1     No current facility-administered medications for this visit. Current Outpatient Medications on File Prior to Visit   Medication Sig    FLUoxetine (PROzac) 10 mg capsule TAKE 1 CAPSULE (10 MG TOTAL) BY MOUTH DAILY    levothyroxine 75 mcg tablet Take 1 tablet (75 mcg total) by mouth daily     No current facility-administered medications on file prior to visit. She has No Known Allergies. .    Review of Systems   Constitutional:  Negative for activity change, appetite change, chills, fatigue and fever. HENT:  Negative for rhinorrhea, sneezing and sore throat. Eyes:  Negative for visual disturbance. Respiratory:  Negative for cough, shortness of breath and wheezing. Cardiovascular:  Negative for chest pain, palpitations and leg swelling. Gastrointestinal:  Negative for abdominal distention, constipation, diarrhea, nausea and vomiting. Genitourinary:  Negative for difficulty urinating, pelvic pain and vaginal discharge.    Neurological:  Negative for syncope and light-headedness. Objective:      /80 (BP Location: Left arm, Patient Position: Sitting, Cuff Size: Standard)   Ht 5' 2.25" (1.581 m)   Wt 64 kg (141 lb 3.2 oz)   LMP  (LMP Unknown)   BMI 25.62 kg/m²          Physical Exam  Chest:   Breasts:     Breasts are symmetrical.      Right: No inverted nipple, mass, nipple discharge, skin change or tenderness. Left: No inverted nipple, mass, nipple discharge, skin change or tenderness. Genitourinary:     Labia:         Right: No rash, tenderness, lesion or injury. Left: No rash, tenderness, lesion or injury. Vagina: Normal. No vaginal discharge, erythema, tenderness or bleeding. Cervix: No cervical motion tenderness, discharge, friability, erythema or cervical bleeding. Uterus: Not deviated, not enlarged, not fixed and not tender. Adnexa:         Right: No mass, tenderness or fullness. Left: No mass, tenderness or fullness. Neurological:      Mental Status: She is alert and oriented to person, place, and time.

## 2023-12-05 ENCOUNTER — ANNUAL EXAM (OUTPATIENT)
Dept: OBGYN CLINIC | Facility: CLINIC | Age: 63
End: 2023-12-05
Payer: COMMERCIAL

## 2023-12-05 VITALS
BODY MASS INDEX: 25.98 KG/M2 | HEIGHT: 62 IN | DIASTOLIC BLOOD PRESSURE: 80 MMHG | WEIGHT: 141.2 LBS | SYSTOLIC BLOOD PRESSURE: 144 MMHG

## 2023-12-05 DIAGNOSIS — Z01.419 ENCOUNTER FOR GYNECOLOGICAL EXAMINATION (GENERAL) (ROUTINE) WITHOUT ABNORMAL FINDINGS: ICD-10-CM

## 2023-12-05 DIAGNOSIS — Z12.31 ENCOUNTER FOR SCREENING MAMMOGRAM FOR BREAST CANCER: ICD-10-CM

## 2023-12-05 DIAGNOSIS — Z01.419 ENCOUNTER FOR ANNUAL ROUTINE GYNECOLOGICAL EXAMINATION: Primary | ICD-10-CM

## 2023-12-05 PROCEDURE — S0612 ANNUAL GYNECOLOGICAL EXAMINA: HCPCS | Performed by: OBSTETRICS & GYNECOLOGY

## 2023-12-05 NOTE — PATIENT INSTRUCTIONS
Wellness Visit for Adults   WHAT YOU NEED TO KNOW:   What is a wellness visit? A wellness visit is when you see your healthcare provider to get screened for health problems. Your healthcare provider will also give you advice on how to stay healthy. Write down your questions so you remember to ask them. Ask your healthcare provider how often you should have a wellness visit. What happens at a wellness visit? Your healthcare provider will ask about your health, and your family history of health problems. This includes high blood pressure, heart disease, and cancer. He or she will ask if you have symptoms that concern you, if you smoke, and about your mood. You may also be asked about your intake of medicines, supplements, food, and alcohol. Any of the following may be done: Your weight  will be checked. Your height may also be checked so your body mass index (BMI) can be calculated. Your BMI shows if you are at a healthy weight. Your blood pressure  and heart rate will be checked. Your temperature may also be checked. Blood and urine tests  may be done. Blood tests may be done to check your cholesterol levels. Abnormal cholesterol levels increase your risk for heart disease and stroke. You may also need a blood or urine test to check for diabetes if you are at increased risk. Urine tests may be done to look for signs of an infection or kidney disease. A physical exam  includes checking your heartbeat and lungs with a stethoscope. Your healthcare provider may also check your skin to look for sun damage. Screening tests  may be recommended. A screening test is done to check for diseases that may not cause symptoms. The screening tests you may need depend on your age, gender, family history, and lifestyle habits. For example, colorectal screening may be recommended if you are 48years old or older. What screening tests do I need if I am a woman? A Pap smear  is used to screen for cervical cancer.  Pap smears are usually done every 3 to 5 years depending on your age. You may need them more often if you have had abnormal Pap smear test results in the past. Ask your healthcare provider how often you should have a Pap smear. A mammogram  is an x-ray of your breasts to screen for breast cancer. Experts recommend mammograms every 2 years starting at age 48 years. You may need a mammogram at age 52 years or younger if you have an increased risk for breast cancer. Talk to your healthcare provider about when you should start having mammograms and how often you need them. What vaccines might I need? Get an influenza vaccine  every year. The influenza vaccine protects you from the flu. Several types of viruses cause the flu. The viruses change over time, so new vaccines are made each year. Get a tetanus-diphtheria (Td) booster vaccine  every 10 years. This vaccine protects you against tetanus and diphtheria. Tetanus is a severe infection that may cause painful muscle spasms and lockjaw. Diphtheria is a severe bacterial infection that causes a thick covering in the back of your mouth and throat. Get a human papillomavirus (HPV) vaccine  if you are female and aged 23 to 32 or male 23 to 24 and never received it. This vaccine protects you from HPV infection. HPV is the most common infection spread by sexual contact. HPV may also cause vaginal, penile, and anal cancers. Get a pneumococcal vaccine  if you are aged 72 years or older. The pneumococcal vaccine is an injection given to protect you from pneumococcal disease. Pneumococcal disease is an infection caused by pneumococcal bacteria. The infection may cause pneumonia, meningitis, or an ear infection. Get a shingles vaccine  if you are 60 or older, even if you have had shingles before. The shingles vaccine is an injection to protect you from the varicella-zoster virus. This is the same virus that causes chickenpox.  Shingles is a painful rash that develops in people who had chickenpox or have been exposed to the virus. How can I eat healthy? My Plate is a model for planning healthy meals. It shows the types and amounts of foods that should go on your plate. Fruits and vegetables make up about half of your plate, and grains and protein make up the other half. A serving of dairy is included on the side of your plate. The amount of calories and serving sizes you need depends on your age, gender, weight, and height. Examples of healthy foods are listed below:  Eat a variety of vegetables  such as dark green, red, and orange vegetables. You can also include canned vegetables low in sodium (salt) and frozen vegetables without added butter or sauces. Eat a variety of fresh fruits , canned fruit in 100% juice, frozen fruit, and dried fruit. Include whole grains. At least half of the grains you eat should be whole grains. Examples include whole-wheat bread, wheat pasta, brown rice, and whole-grain cereals such as oatmeal.    Eat a variety of protein foods such as seafood (fish and shellfish), lean meat, and poultry without skin (turkey and chicken). Examples of lean meats include pork leg, shoulder, or tenderloin, and beef round, sirloin, tenderloin, and extra lean ground beef. Other protein foods include eggs and egg substitutes, beans, peas, soy products, nuts, and seeds. Choose low-fat dairy products such as skim or 1% milk or low-fat yogurt, cheese, and cottage cheese. Limit unhealthy fats  such as butter, hard margarine, and shortening. How much exercise do I need? Exercise at least 30 minutes per day on most days of the week. Some examples of exercise include walking, biking, dancing, and swimming. You can also fit in more physical activity by taking the stairs instead of the elevator or parking farther away from stores. Include muscle strengthening activities 2 days each week. Regular exercise provides many health benefits.  It helps you manage your weight, and decreases your risk for type 2 diabetes, heart disease, stroke, and high blood pressure. Exercise can also help improve your mood. Ask your healthcare provider about the best exercise plan for you. What are some general health and safety guidelines I should follow? Do not smoke. Nicotine and other chemicals in cigarettes and cigars can cause lung damage. Ask your healthcare provider for information if you currently smoke and need help to quit. E-cigarettes or smokeless tobacco still contain nicotine. Talk to your healthcare provider before you use these products. Limit alcohol. A drink of alcohol is 12 ounces of beer, 5 ounces of wine, or 1½ ounces of liquor. Lose weight, if needed. Being overweight increases your risk of certain health conditions. These include heart disease, high blood pressure, type 2 diabetes, and certain types of cancer. Protect your skin. Do not sunbathe or use tanning beds. Use sunscreen with a SPF 15 or higher. Apply sunscreen at least 15 minutes before you go outside. Reapply sunscreen every 2 hours. Wear protective clothing, hats, and sunglasses when you are outside. Drive safely. Always wear your seatbelt. Make sure everyone in your car wears a seatbelt. A seatbelt can save your life if you are in an accident. Do not use your cell phone when you are driving. This could distract you and cause an accident. Pull over if you need to make a call or send a text message. Practice safe sex. Use latex condoms if are sexually active and have more than one partner. Your healthcare provider may recommend screening tests for sexually transmitted infections (STIs). Wear helmets, lifejackets, and protective gear. Always wear a helmet when you ride a bike or motorcycle, go skiing, or play sports that could cause a head injury. Wear protective equipment when you play sports. Wear a lifejacket when you are on a boat or doing water sports.     CARE AGREEMENT: You have the right to help plan your care. Learn about your health condition and how it may be treated. Discuss treatment options with your healthcare providers to decide what care you want to receive. You always have the right to refuse treatment. The above information is an  only. It is not intended as medical advice for individual conditions or treatments. Talk to your doctor, nurse or pharmacist before following any medical regimen to see if it is safe and effective for you. © Copyright Mar Mar 2023 Information is for End User's use only and may not be sold, redistributed or otherwise used for commercial purposes.

## 2023-12-05 NOTE — ASSESSMENT & PLAN NOTE
Pap/HPV current  Mammogram ordered   Colonoscopy current     Encourage healthy diet, exercise, Calcium 1200mg per day and at least 800 iu Vitamin D daily.

## 2023-12-30 DIAGNOSIS — F41.9 ANXIETY: ICD-10-CM

## 2024-01-02 RX ORDER — FLUOXETINE 10 MG/1
10 CAPSULE ORAL DAILY
Qty: 90 CAPSULE | Refills: 1 | Status: SHIPPED | OUTPATIENT
Start: 2024-01-02 | End: 2024-06-30

## 2024-01-16 ENCOUNTER — OFFICE VISIT (OUTPATIENT)
Dept: FAMILY MEDICINE CLINIC | Facility: CLINIC | Age: 64
End: 2024-01-16
Payer: COMMERCIAL

## 2024-01-16 VITALS
TEMPERATURE: 99.5 F | HEIGHT: 63 IN | WEIGHT: 143 LBS | DIASTOLIC BLOOD PRESSURE: 100 MMHG | OXYGEN SATURATION: 97 % | HEART RATE: 76 BPM | SYSTOLIC BLOOD PRESSURE: 180 MMHG | BODY MASS INDEX: 25.34 KG/M2

## 2024-01-16 DIAGNOSIS — R41.89 COGNITIVE CHANGE: ICD-10-CM

## 2024-01-16 DIAGNOSIS — E03.8 OTHER SPECIFIED HYPOTHYROIDISM: Primary | ICD-10-CM

## 2024-01-16 DIAGNOSIS — F41.9 ANXIETY: ICD-10-CM

## 2024-01-16 DIAGNOSIS — R03.0 ELEVATED BLOOD PRESSURE READING IN OFFICE WITHOUT DIAGNOSIS OF HYPERTENSION: ICD-10-CM

## 2024-01-16 PROCEDURE — 99214 OFFICE O/P EST MOD 30 MIN: CPT | Performed by: PHYSICIAN ASSISTANT

## 2024-01-16 RX ORDER — FLUOXETINE HYDROCHLORIDE 20 MG/1
20 CAPSULE ORAL DAILY
Qty: 90 CAPSULE | Refills: 1 | Status: ON HOLD | OUTPATIENT
Start: 2024-01-16 | End: 2024-07-14

## 2024-01-16 NOTE — PROGRESS NOTES
"Name: Mary Ashby      : 1960      MRN: [id number]  Encounter Provider: Sinan Palacios PA-C  Encounter Date: 2024   Encounter department: [facility]    Assessment & Plan     1. Other specified hypothyroidism  -     TSH, 3rd generation with Free T4 reflex; Future    2. Anxiety  -     FLUoxetine (PROzac) 20 mg capsule; Take 1 capsule (20 mg total) by mouth daily    3. Cognitive change  -     MRI brain NeuroQuant wo and w contrast; Future; Expected date: 2024  -     Comprehensive metabolic panel; Future  -     CBC and differential; Future  -     TSH, 3rd generation with Free T4 reflex; Future  -     Vitamin B12; Future  -     Folate; Future    4. Elevated blood pressure reading in office without diagnosis of hypertension    Normal neurologic exam. Not consistent with absence seizures. Her MOCO score is 21/30 with noted word finding difficulty, see scanned document to media. Will seek MRI w/ neuroquant and labs as above. Also, after discussion, pt agreeable to increase prozac dose to 20mg. Discuss care options with husbands PCP to help offset responsibility/stress for pt.   Also, BP is high, previously normal, no hx of HTN. Monitor BP BID with parameters given.   1 month follow up, earlier prn       Subjective     Pt presents today with her daughter to discuss worsening concerns of \"stress\" at home. Pts  has several chronic illnesses regarding nearly full time care, pt also cares for elderly parents. Pts daughter notes pt often feels/appears overwhelmed. Pt and daughter shares stories of pt having word finding difficulties during times of intense stress. Daughter notes pt has trouble \"finding her nouns\" and often will not be able to form the appropriate sentence for what she is trying to convey. This does not occur outside of stressful situations. Pt notes episode of having a cup and coffee and then just staring at her milk instead of knowing to put it in her coffee. " No seizure/CVA hx. Pt does have anxiety and is on prozac. No facial droops, slurred speech, weakness/numbness.      Review of Systems   Constitutional:  Negative for chills, fatigue and fever.   HENT:  Negative for congestion, ear pain, hearing loss, nosebleeds, postnasal drip, rhinorrhea, sinus pressure, sinus pain, sneezing and sore throat.    Eyes:  Negative for pain, discharge, itching and visual disturbance.   Respiratory:  Negative for cough, chest tightness, shortness of breath and wheezing.    Cardiovascular:  Negative for chest pain, palpitations and leg swelling.   Gastrointestinal:  Negative for abdominal pain, blood in stool, constipation, diarrhea, nausea and vomiting.   Genitourinary:  Negative for frequency and urgency.   Neurological:  Positive for speech difficulty. Negative for dizziness, tremors, seizures, facial asymmetry, weakness, light-headedness, numbness and headaches.       Past Medical History:   Diagnosis Date   • Arthritis    • BRCA1 negative    • Disease of thyroid gland    • Endometrial cancer (HCC) 1991   • Thyroid disease    • Uterus cancer (HCC)      Past Surgical History:   Procedure Laterality Date   • HYSTERECTOMY      still has ovaries     Family History   Problem Relation Age of Onset   • Breast cancer Mother 50   • No Known Problems Father    • Cancer Sister    • Lymphoma Brother    • Melanoma Brother 50   • No Known Problems Daughter    • No Known Problems Maternal Grandmother    • No Known Problems Maternal Grandfather    • No Known Problems Paternal Grandmother    • No Known Problems Paternal Grandfather    • No Known Problems Son    • Colon cancer Paternal Uncle    • Skin cancer Maternal Aunt    • Breast cancer Maternal Aunt      Social History     Socioeconomic History   • Marital status: /Civil Union     Spouse name: None   • Number of children: None   • Years of education: None   • Highest education level: None   Occupational History   • None   Tobacco Use   •  "Smoking status: Former     Types: Cigarettes   • Smokeless tobacco: Never   Vaping Use   • Vaping status: Never Used   Substance and Sexual Activity   • Alcohol use: Yes     Alcohol/week: 3.0 standard drinks of alcohol     Types: 3 Glasses of wine per week     Comment: social   • Drug use: No   • Sexual activity: Not Currently     Partners: Male     Birth control/protection: Surgical   Other Topics Concern   • None   Social History Narrative   • None     Social Determinants of Health     Financial Resource Strain: Not on file   Food Insecurity: Not on file   Transportation Needs: Not on file   Physical Activity: Not on file   Stress: Not on file   Social Connections: Not on file   Intimate Partner Violence: Not on file   Housing Stability: Not on file     Current Outpatient Medications on File Prior to Visit   Medication Sig   • levothyroxine 75 mcg tablet Take 1 tablet (75 mcg total) by mouth daily   • [DISCONTINUED] FLUoxetine (PROzac) 10 mg capsule TAKE 1 CAPSULE (10 MG TOTAL) BY MOUTH DAILY     No Known Allergies  Immunization History   Administered Date(s) Administered   • COVID-19 PFIZER VACCINE 0.3 ML IM 05/19/2021, 06/09/2021, 01/11/2022       Objective     BP (!) 180/100 (BP Location: Left arm, Patient Position: Sitting, Cuff Size: Standard)   Pulse 76   Temp 99.5 °F (37.5 °C)   Ht 5' 2.5\" (1.588 m)   Wt 64.9 kg (143 lb)   LMP  (LMP Unknown)   SpO2 97%   BMI 25.74 kg/m²     Physical Exam  Vitals and nursing note reviewed.   Constitutional:       General: She is not in acute distress.     Appearance: Normal appearance.   HENT:      Head: Normocephalic and atraumatic.      Nose: Nose normal.      Mouth/Throat:      Mouth: Mucous membranes are moist.      Pharynx: Oropharynx is clear. No oropharyngeal exudate or posterior oropharyngeal erythema.   Eyes:      Pupils: Pupils are equal, round, and reactive to light.   Cardiovascular:      Rate and Rhythm: Normal rate and regular rhythm.      Heart sounds: " Normal heart sounds.   Pulmonary:      Effort: Pulmonary effort is normal. No respiratory distress.      Breath sounds: Normal breath sounds. No wheezing, rhonchi or rales.   Abdominal:      General: Bowel sounds are normal.      Palpations: Abdomen is soft.   Musculoskeletal:         General: Normal range of motion.      Cervical back: Normal range of motion and neck supple.   Skin:     General: Skin is warm and dry.   Neurological:      General: No focal deficit present.      Mental Status: She is alert and oriented to person, place, and time. Mental status is at baseline.      Cranial Nerves: No cranial nerve deficit.      Sensory: No sensory deficit.      Motor: No weakness.      Coordination: Coordination normal.      Gait: Gait normal.      Deep Tendon Reflexes: Reflexes normal.   Psychiatric:         Attention and Perception: Attention normal.         Mood and Affect: Affect normal. Mood is anxious.         Speech: Speech normal.         Behavior: Behavior normal.       Sinan Palacios PA-C

## 2024-01-17 ENCOUNTER — APPOINTMENT (OUTPATIENT)
Dept: LAB | Facility: HOSPITAL | Age: 64
End: 2024-01-17
Payer: COMMERCIAL

## 2024-01-17 DIAGNOSIS — D72.819 LEUKOPENIA, UNSPECIFIED TYPE: Primary | ICD-10-CM

## 2024-01-17 DIAGNOSIS — Z13.220 SCREENING FOR LIPID DISORDERS: ICD-10-CM

## 2024-01-17 DIAGNOSIS — R41.89 COGNITIVE CHANGE: ICD-10-CM

## 2024-01-17 DIAGNOSIS — Z13.1 SCREENING FOR DIABETES MELLITUS: ICD-10-CM

## 2024-01-17 DIAGNOSIS — E03.8 OTHER SPECIFIED HYPOTHYROIDISM: ICD-10-CM

## 2024-01-17 LAB
ALBUMIN SERPL BCP-MCNC: 4.5 G/DL (ref 3.5–5)
ALP SERPL-CCNC: 67 U/L (ref 34–104)
ALT SERPL W P-5'-P-CCNC: 19 U/L (ref 7–52)
ANION GAP SERPL CALCULATED.3IONS-SCNC: 9 MMOL/L
AST SERPL W P-5'-P-CCNC: 22 U/L (ref 13–39)
BASOPHILS # BLD AUTO: 0.01 THOUSANDS/ÂΜL (ref 0–0.1)
BASOPHILS NFR BLD AUTO: 0 % (ref 0–1)
BILIRUB SERPL-MCNC: 0.91 MG/DL (ref 0.2–1)
BUN SERPL-MCNC: 13 MG/DL (ref 5–25)
CALCIUM SERPL-MCNC: 9.3 MG/DL (ref 8.4–10.2)
CHLORIDE SERPL-SCNC: 103 MMOL/L (ref 96–108)
CHOLEST SERPL-MCNC: 195 MG/DL
CO2 SERPL-SCNC: 28 MMOL/L (ref 21–32)
CREAT SERPL-MCNC: 0.66 MG/DL (ref 0.6–1.3)
EOSINOPHIL # BLD AUTO: 0.04 THOUSAND/ÂΜL (ref 0–0.61)
EOSINOPHIL NFR BLD AUTO: 1 % (ref 0–6)
ERYTHROCYTE [DISTWIDTH] IN BLOOD BY AUTOMATED COUNT: 12.3 % (ref 11.6–15.1)
FOLATE SERPL-MCNC: 21.2 NG/ML
GFR SERPL CREATININE-BSD FRML MDRD: 95 ML/MIN/1.73SQ M
GLUCOSE P FAST SERPL-MCNC: 92 MG/DL (ref 65–99)
HCT VFR BLD AUTO: 44.1 % (ref 34.8–46.1)
HDLC SERPL-MCNC: 64 MG/DL
HGB BLD-MCNC: 14.7 G/DL (ref 11.5–15.4)
IMM GRANULOCYTES # BLD AUTO: 0.01 THOUSAND/UL (ref 0–0.2)
IMM GRANULOCYTES NFR BLD AUTO: 0 % (ref 0–2)
LDLC SERPL CALC-MCNC: 108 MG/DL (ref 0–100)
LYMPHOCYTES # BLD AUTO: 1.17 THOUSANDS/ÂΜL (ref 0.6–4.47)
LYMPHOCYTES NFR BLD AUTO: 32 % (ref 14–44)
MCH RBC QN AUTO: 30.8 PG (ref 26.8–34.3)
MCHC RBC AUTO-ENTMCNC: 33.3 G/DL (ref 31.4–37.4)
MCV RBC AUTO: 93 FL (ref 82–98)
MONOCYTES # BLD AUTO: 0.33 THOUSAND/ÂΜL (ref 0.17–1.22)
MONOCYTES NFR BLD AUTO: 9 % (ref 4–12)
NEUTROPHILS # BLD AUTO: 2.12 THOUSANDS/ÂΜL (ref 1.85–7.62)
NEUTS SEG NFR BLD AUTO: 58 % (ref 43–75)
NRBC BLD AUTO-RTO: 0 /100 WBCS
PLATELET # BLD AUTO: 164 THOUSANDS/UL (ref 149–390)
PMV BLD AUTO: 10 FL (ref 8.9–12.7)
POTASSIUM SERPL-SCNC: 4 MMOL/L (ref 3.5–5.3)
PROT SERPL-MCNC: 7.3 G/DL (ref 6.4–8.4)
RBC # BLD AUTO: 4.77 MILLION/UL (ref 3.81–5.12)
SODIUM SERPL-SCNC: 140 MMOL/L (ref 135–147)
TRIGL SERPL-MCNC: 115 MG/DL
TSH SERPL DL<=0.05 MIU/L-ACNC: 2.86 UIU/ML (ref 0.45–4.5)
VIT B12 SERPL-MCNC: 404 PG/ML (ref 180–914)
WBC # BLD AUTO: 3.68 THOUSAND/UL (ref 4.31–10.16)

## 2024-01-17 PROCEDURE — 80053 COMPREHEN METABOLIC PANEL: CPT

## 2024-01-17 PROCEDURE — 84443 ASSAY THYROID STIM HORMONE: CPT

## 2024-01-17 PROCEDURE — 82746 ASSAY OF FOLIC ACID SERUM: CPT

## 2024-01-17 PROCEDURE — 36415 COLL VENOUS BLD VENIPUNCTURE: CPT

## 2024-01-17 PROCEDURE — 85025 COMPLETE CBC W/AUTO DIFF WBC: CPT

## 2024-01-17 PROCEDURE — 82607 VITAMIN B-12: CPT

## 2024-01-17 PROCEDURE — 80061 LIPID PANEL: CPT

## 2024-01-19 ENCOUNTER — HOSPITAL ENCOUNTER (OUTPATIENT)
Dept: MAMMOGRAPHY | Facility: HOSPITAL | Age: 64
End: 2024-01-19
Payer: COMMERCIAL

## 2024-01-19 VITALS — HEIGHT: 63 IN | BODY MASS INDEX: 25.34 KG/M2 | WEIGHT: 143 LBS

## 2024-01-19 DIAGNOSIS — Z12.31 ENCOUNTER FOR SCREENING MAMMOGRAM FOR BREAST CANCER: ICD-10-CM

## 2024-01-19 PROCEDURE — 77063 BREAST TOMOSYNTHESIS BI: CPT

## 2024-01-19 PROCEDURE — 77067 SCR MAMMO BI INCL CAD: CPT

## 2024-01-23 ENCOUNTER — HOSPITAL ENCOUNTER (EMERGENCY)
Facility: HOSPITAL | Age: 64
Discharge: STILL ADMITTED OR ADMINISTRATIVE | End: 2024-01-24
Attending: EMERGENCY MEDICINE
Payer: COMMERCIAL

## 2024-01-23 ENCOUNTER — HOSPITAL ENCOUNTER (OUTPATIENT)
Dept: MRI IMAGING | Facility: HOSPITAL | Age: 64
Discharge: HOME | End: 2024-01-23
Payer: COMMERCIAL

## 2024-01-23 DIAGNOSIS — R90.89 MIDLINE SHIFT OF BRAIN: ICD-10-CM

## 2024-01-23 DIAGNOSIS — R41.89 COGNITIVE CHANGE: ICD-10-CM

## 2024-01-23 DIAGNOSIS — G93.5 UNCAL HERNIATION (HCC): ICD-10-CM

## 2024-01-23 DIAGNOSIS — D32.9 MENINGIOMA (HCC): ICD-10-CM

## 2024-01-23 DIAGNOSIS — G93.6 VASOGENIC BRAIN EDEMA (HCC): Primary | ICD-10-CM

## 2024-01-23 LAB
ALBUMIN SERPL BCP-MCNC: 4.6 G/DL (ref 3.5–5)
ALP SERPL-CCNC: 81 U/L (ref 34–104)
ALT SERPL W P-5'-P-CCNC: 21 U/L (ref 7–52)
ANION GAP SERPL CALCULATED.3IONS-SCNC: 8 MMOL/L
APTT PPP: 40 SECONDS (ref 23–37)
AST SERPL W P-5'-P-CCNC: 23 U/L (ref 13–39)
BASOPHILS # BLD AUTO: 0.03 THOUSANDS/ÂΜL (ref 0–0.1)
BASOPHILS NFR BLD AUTO: 1 % (ref 0–1)
BILIRUB SERPL-MCNC: 0.7 MG/DL (ref 0.2–1)
BUN SERPL-MCNC: 19 MG/DL (ref 5–25)
CALCIUM SERPL-MCNC: 9.6 MG/DL (ref 8.4–10.2)
CHLORIDE SERPL-SCNC: 100 MMOL/L (ref 96–108)
CO2 SERPL-SCNC: 28 MMOL/L (ref 21–32)
CREAT SERPL-MCNC: 0.63 MG/DL (ref 0.6–1.3)
EOSINOPHIL # BLD AUTO: 0.05 THOUSAND/ÂΜL (ref 0–0.61)
EOSINOPHIL NFR BLD AUTO: 1 % (ref 0–6)
ERYTHROCYTE [DISTWIDTH] IN BLOOD BY AUTOMATED COUNT: 12.1 % (ref 11.6–15.1)
GFR SERPL CREATININE-BSD FRML MDRD: 97 ML/MIN/1.73SQ M
GLUCOSE SERPL-MCNC: 98 MG/DL (ref 65–140)
HCT VFR BLD AUTO: 42.3 % (ref 34.8–46.1)
HGB BLD-MCNC: 14.2 G/DL (ref 11.5–15.4)
IMM GRANULOCYTES # BLD AUTO: 0.02 THOUSAND/UL (ref 0–0.2)
IMM GRANULOCYTES NFR BLD AUTO: 0 % (ref 0–2)
INR PPP: 1.03 (ref 0.84–1.19)
LYMPHOCYTES # BLD AUTO: 1.26 THOUSANDS/ÂΜL (ref 0.6–4.47)
LYMPHOCYTES NFR BLD AUTO: 26 % (ref 14–44)
MAGNESIUM SERPL-MCNC: 2.2 MG/DL (ref 1.9–2.7)
MCH RBC QN AUTO: 30.7 PG (ref 26.8–34.3)
MCHC RBC AUTO-ENTMCNC: 33.6 G/DL (ref 31.4–37.4)
MCV RBC AUTO: 92 FL (ref 82–98)
MONOCYTES # BLD AUTO: 0.4 THOUSAND/ÂΜL (ref 0.17–1.22)
MONOCYTES NFR BLD AUTO: 8 % (ref 4–12)
NEUTROPHILS # BLD AUTO: 3.02 THOUSANDS/ÂΜL (ref 1.85–7.62)
NEUTS SEG NFR BLD AUTO: 64 % (ref 43–75)
NRBC BLD AUTO-RTO: 0 /100 WBCS
PLATELET # BLD AUTO: 152 THOUSANDS/UL (ref 149–390)
PMV BLD AUTO: 10.1 FL (ref 8.9–12.7)
POTASSIUM SERPL-SCNC: 3.8 MMOL/L (ref 3.5–5.3)
PROT SERPL-MCNC: 7.4 G/DL (ref 6.4–8.4)
PROTHROMBIN TIME: 13.6 SECONDS (ref 11.6–14.5)
RBC # BLD AUTO: 4.62 MILLION/UL (ref 3.81–5.12)
SODIUM SERPL-SCNC: 136 MMOL/L (ref 135–147)
WBC # BLD AUTO: 4.78 THOUSAND/UL (ref 4.31–10.16)

## 2024-01-23 PROCEDURE — 96374 THER/PROPH/DIAG INJ IV PUSH: CPT

## 2024-01-23 PROCEDURE — 83735 ASSAY OF MAGNESIUM: CPT | Performed by: EMERGENCY MEDICINE

## 2024-01-23 PROCEDURE — 85730 THROMBOPLASTIN TIME PARTIAL: CPT | Performed by: EMERGENCY MEDICINE

## 2024-01-23 PROCEDURE — 36415 COLL VENOUS BLD VENIPUNCTURE: CPT | Performed by: EMERGENCY MEDICINE

## 2024-01-23 PROCEDURE — A9585 GADOBUTROL INJECTION: HCPCS | Performed by: PHYSICIAN ASSISTANT

## 2024-01-23 PROCEDURE — 85610 PROTHROMBIN TIME: CPT | Performed by: EMERGENCY MEDICINE

## 2024-01-23 PROCEDURE — 80053 COMPREHEN METABOLIC PANEL: CPT | Performed by: EMERGENCY MEDICINE

## 2024-01-23 PROCEDURE — 99285 EMERGENCY DEPT VISIT HI MDM: CPT | Performed by: EMERGENCY MEDICINE

## 2024-01-23 PROCEDURE — 70553 MRI BRAIN STEM W/O & W/DYE: CPT

## 2024-01-23 PROCEDURE — 96375 TX/PRO/DX INJ NEW DRUG ADDON: CPT

## 2024-01-23 PROCEDURE — 85025 COMPLETE CBC W/AUTO DIFF WBC: CPT | Performed by: EMERGENCY MEDICINE

## 2024-01-23 PROCEDURE — 99284 EMERGENCY DEPT VISIT MOD MDM: CPT

## 2024-01-23 PROCEDURE — G1004 CDSM NDSC: HCPCS

## 2024-01-23 RX ORDER — DEXAMETHASONE SODIUM PHOSPHATE 10 MG/ML
10 INJECTION, SOLUTION INTRAMUSCULAR; INTRAVENOUS ONCE
Status: COMPLETED | OUTPATIENT
Start: 2024-01-23 | End: 2024-01-23

## 2024-01-23 RX ORDER — GADOBUTROL 604.72 MG/ML
10 INJECTION INTRAVENOUS
Status: COMPLETED | OUTPATIENT
Start: 2024-01-23 | End: 2024-01-23

## 2024-01-23 RX ORDER — LEVETIRACETAM 500 MG/5ML
1000 INJECTION, SOLUTION, CONCENTRATE INTRAVENOUS ONCE
Status: COMPLETED | OUTPATIENT
Start: 2024-01-23 | End: 2024-01-23

## 2024-01-23 RX ADMIN — GADOBUTROL 10 ML: 604.72 INJECTION INTRAVENOUS at 14:42

## 2024-01-23 RX ADMIN — LEVETIRACETAM 1000 MG: 100 INJECTION, SOLUTION INTRAVENOUS at 16:58

## 2024-01-23 RX ADMIN — DEXAMETHASONE SODIUM PHOSPHATE 10 MG: 10 INJECTION, SOLUTION INTRAMUSCULAR; INTRAVENOUS at 16:58

## 2024-01-23 NOTE — ED PROVIDER NOTES
History  Chief Complaint   Patient presents with    Evaluation of Abnormal Diagnostic Test     Abnormal finding on MRI of brain     Patient is a 63-year-old female with a relevant past medical history of arthritis, thyroid disease, endometrial cancer, and uterine cancer presenting with abnormal brain MRI findings. Patient reports she has had very seldom episodes of receptive aphasia for the last 5 years along with occasional word finding difficulty. She realized there was an issue when she went to take her medication and she forgot what she was doing for approximately 30 seconds. This happened 2 weeks ago. She was seen by her PCP on 1/16/2024 for her annual visit and found to have a MOCA score of 21/30 and word finding difficulty. Patient had a normal neuro examination. Her PCP ordered an outpatient brain MRI and it was performed today. The MRI technician recognized midline shift when he was performing the imaging and sent her over to the ER. A STAT MRI read was ordered. Findings are noted in chart. Patient reports she has been asymptomatic for the past 2 weeks since she told her kids. She felt her symptoms were due to the many stressors in her life, including taking care of her . Patient denies headache, dizziness, blurry vision, weight loss, weakness, chest pain, shortness of breath, abdominal pain, nausea, vomiting, constipation, or diarrhea.          History provided by:  Patient  Evaluation of Abnormal Diagnostic Test      Prior to Admission Medications   Prescriptions Last Dose Informant Patient Reported? Taking?   FLUoxetine (PROzac) 20 mg capsule   No No   Sig: Take 1 capsule (20 mg total) by mouth daily   levothyroxine 75 mcg tablet   No No   Sig: Take 1 tablet (75 mcg total) by mouth daily      Facility-Administered Medications: None       Past Medical History:   Diagnosis Date    Arthritis     BRCA1 negative     Disease of thyroid gland     Endometrial cancer (HCC) 1991    Thyroid disease      Uterus cancer (HCC)        Past Surgical History:   Procedure Laterality Date    HYSTERECTOMY      still has ovaries       Family History   Problem Relation Age of Onset    Breast cancer Mother 50    No Known Problems Father     Cancer Sister     No Known Problems Daughter     No Known Problems Maternal Grandmother     No Known Problems Maternal Grandfather     No Known Problems Paternal Grandmother     No Known Problems Paternal Grandfather     Lymphoma Brother     Melanoma Brother 50    No Known Problems Son     Skin cancer Maternal Aunt     Breast cancer Maternal Aunt     Colon cancer Paternal Uncle     Breast cancer Cousin      I have reviewed and agree with the history as documented.    E-Cigarette/Vaping    E-Cigarette Use Never User      E-Cigarette/Vaping Substances    Nicotine No     THC No     CBD No     Flavoring No     Other No     Unknown No      Social History     Tobacco Use    Smoking status: Former     Types: Cigarettes    Smokeless tobacco: Never   Vaping Use    Vaping status: Never Used   Substance Use Topics    Alcohol use: Yes     Alcohol/week: 3.0 standard drinks of alcohol     Types: 3 Glasses of wine per week     Comment: social    Drug use: No       Review of Systems   Constitutional:  Negative for chills and fever.   HENT:  Negative for ear pain and sore throat.    Eyes:  Negative for photophobia, pain and visual disturbance.   Respiratory:  Negative for cough and shortness of breath.    Cardiovascular:  Negative for chest pain and palpitations.   Gastrointestinal:  Negative for abdominal pain, constipation, diarrhea, nausea and vomiting.   Genitourinary:  Negative for dysuria and hematuria.   Musculoskeletal:  Negative for arthralgias and back pain.   Skin:  Negative for color change and rash.   Neurological:  Negative for dizziness, seizures, syncope, weakness, light-headedness, numbness and headaches.   Psychiatric/Behavioral:  Negative for confusion.    All other systems reviewed and  are negative.      Physical Exam  Physical Exam  Vitals and nursing note reviewed.   Constitutional:       General: She is not in acute distress.     Appearance: Normal appearance. She is well-developed and normal weight. She is not ill-appearing.   HENT:      Head: Normocephalic and atraumatic.      Mouth/Throat:      Mouth: Mucous membranes are moist.   Eyes:      Conjunctiva/sclera: Conjunctivae normal.   Cardiovascular:      Rate and Rhythm: Normal rate and regular rhythm.      Heart sounds: No murmur heard.  Pulmonary:      Effort: Pulmonary effort is normal. No respiratory distress.      Breath sounds: Normal breath sounds.   Abdominal:      General: Abdomen is flat.      Palpations: Abdomen is soft.      Tenderness: There is no abdominal tenderness.   Musculoskeletal:         General: No swelling.      Cervical back: Neck supple.   Skin:     General: Skin is warm and dry.      Capillary Refill: Capillary refill takes less than 2 seconds.   Neurological:      General: No focal deficit present.      Mental Status: She is alert and oriented to person, place, and time.      GCS: GCS eye subscore is 4. GCS verbal subscore is 5. GCS motor subscore is 6.      Cranial Nerves: Cranial nerves 2-12 are intact.      Sensory: Sensation is intact.      Motor: Motor function is intact.      Coordination: Coordination is intact.      Gait: Gait is intact.   Psychiatric:         Mood and Affect: Mood normal.         Vital Signs  ED Triage Vitals   Temperature Pulse Respirations Blood Pressure SpO2   01/23/24 1503 01/23/24 1503 01/23/24 1503 01/23/24 1503 01/23/24 1503   97.9 °F (36.6 °C) 79 20 (!) 215/95 99 %      Temp Source Heart Rate Source Patient Position - Orthostatic VS BP Location FiO2 (%)   01/23/24 1503 01/23/24 1503 01/23/24 1503 01/23/24 1503 --   Temporal Monitor Lying Left arm       Pain Score       01/23/24 1504       No Pain           Vitals:    01/23/24 1503 01/23/24 1605   BP: (!) 215/95 (!) 188/95   Pulse:  79 78   Patient Position - Orthostatic VS: Lying          Visual Acuity      ED Medications  Medications   levETIRAcetam (KEPPRA) injection 1,000 mg (1,000 mg Intravenous Given 1/23/24 1658)   dexamethasone (PF) (DECADRON) injection 10 mg (10 mg Intravenous Given 1/23/24 1658)       Diagnostic Studies  Results Reviewed       Procedure Component Value Units Date/Time    Magnesium [[id number]]  (Normal) Collected: 01/23/24 1519    Lab Status: Final result Specimen: Blood from Arm, Right Updated: 01/23/24 1547     Magnesium 2.2 mg/dL     Comprehensive metabolic panel [[id number]] Collected: 01/23/24 1519    Lab Status: Final result Specimen: Blood from Arm, Right Updated: 01/23/24 1547     Sodium 136 mmol/L      Potassium 3.8 mmol/L      Chloride 100 mmol/L      CO2 28 mmol/L      ANION GAP 8 mmol/L      BUN 19 mg/dL      Creatinine 0.63 mg/dL      Glucose 98 mg/dL      Calcium 9.6 mg/dL      AST 23 U/L      ALT 21 U/L      Alkaline Phosphatase 81 U/L      Total Protein 7.4 g/dL      Albumin 4.6 g/dL      Total Bilirubin 0.70 mg/dL      eGFR 97 ml/min/1.73sq m     Narrative:      National Kid[employer] Foundation guidelines for Chronic Kidney Disease (CKD):     Stage 1 with normal or high GFR (GFR > 90 mL/min/1.73 square meters)    Stage 2 Mild CKD (GFR = 60-89 mL/min/1.73 square meters)    Stage 3A Moderate CKD (GFR = 45-59 mL/min/1.73 square meters)    Stage 3B Moderate CKD (GFR = 30-44 mL/min/1.73 square meters)    Stage 4 Severe CKD (GFR = 15-29 mL/min/1.73 square meters)    Stage 5 End Stage CKD (GFR <15 mL/min/1.73 square meters)  Note: GFR calculation is accurate only with a steady state creatinine    Protime-INR [[id number]]  (Normal) Collected: 01/23/24 1519    Lab Status: Final result Specimen: Blood from Arm, Right Updated: 01/23/24 1541     Protime 13.6 seconds      INR 1.03    APTT [[id number]]  (Abnormal) Collected: 01/23/24 1519    Lab Status: Final result Specimen: Blood from Arm, Right Updated: 01/23/24  1541     PTT 40 seconds     CBC and differential [[id number]] Collected: 01/23/24 1519    Lab Status: Final result Specimen: Blood from Arm, Right Updated: 01/23/24 1526     WBC 4.78 Thousand/uL      RBC 4.62 Million/uL      Hemoglobin 14.2 g/dL      Hematocrit 42.3 %      MCV 92 fL      MCH 30.7 pg      MCHC 33.6 g/dL      RDW 12.1 %      MPV 10.1 fL      Platelets 152 Thousands/uL      nRBC 0 /100 WBCs      Neutrophils Relative 64 %      Immat GRANS % 0 %      Lymphocytes Relative 26 %      Monocytes Relative 8 %      Eosinophils Relative 1 %      Basophils Relative 1 %      Neutrophils Absolute 3.02 Thousands/µL      Immature Grans Absolute 0.02 Thousand/uL      Lymphocytes Absolute 1.26 Thousands/µL      Monocytes Absolute 0.40 Thousand/µL      Eosinophils Absolute 0.05 Thousand/µL      Basophils Absolute 0.03 Thousands/µL                    No orders to display              Procedures  Procedures         ED Course  ED Course as of 01/23/24 1753   Tue Jan 23, 2024   1513 Vital signs reviewed. Blood pressure elevated at 215/95. She is afebrile with normal heart rate, respiratory rate, and oxygen saturation.   1530 IMPRESSION:     4.3 cm probable meningioma in anterior aspect of left middle cranial fossa with small dural feeder vessels, diffuse surrounding vasogenic edema (left frontal, left subinsular, left posterior limb of internal capsule, left parietal, and left temporal   lobes), compressive mass effect on adjacent left temporal lobe and left lateral ventricle, 0.7 cm rightward midline shift, left uncal herniation, and rightward shift of upper brainstem. Recommend neurosurgical consultation for further evaluation.     NeuroQuant analysis was performed:   Normal study however this is confounded by a left middle cranial fossa mass and the results of this study are likely inconclusive but overall does not support neurodegeneration.     I personally instructed the MR technologist to send this patient to the  emergency room for further evaluation at approximately 2:21 PM and to complete remainder of study.     I personally discussed this study with RUDOLPH MCKEON on 1/23/2024 3:11 PM.     1535 CBC and differential  CBC unremarkable. No leukocytosis, anemia, or platelet dysfunction.   1541 TC sent to neurocritical care.   1550 Comprehensive metabolic panel  CMP unremarkable. No electrolyte abnormality, HORTENCIA, or transaminitis.   1551 Magnesium: 2.2  Magnesium within normal limits.   1551 PTT(!): 40   1551 PROTIME: 13.6   1551 POCT INR: 1.03   1605 Blood Pressure(!): 188/95  Blood pressure much improved on repeat.   1610 Neuro critical care reviewed MRI and recommends placing patient on Keppra for seizure prophylaxis and starting Decadron.  Patient can be transferred to [facility] with neurosurgery consult as stepdown 2 at [facility].   1611 Patient worked up because she won't be able to take care of her  tonight but agrees to transfer.    1706 PAC called ED provider. Spoke with Dr. Nicholson who recommends reaching out to neurosurgery before accepting the patient to his service.   1712 Reached out to neurosurgery for their recommendations.   1732 Neurosurgery agrees with the transfer reporting that the patient will likely need intervention. Relayed this to PAC and Dr. Nicholson.                  Stroke Assessment       Row Name 01/23/24 1607             NIH Stroke Scale    Interval Baseline      Level of Consciousness (1a.) 0      LOC Questions (1b.) 0      LOC Commands (1c.) 0      Best Gaze (2.) 0      Visual (3.) 0      Facial Palsy (4.) 0      Motor Arm, Left (5a.) 0      Motor Arm, Right (5b.) 0      Motor Leg, Left (6a.) 0      Motor Leg, Right (6b.) 0      Limb Ataxia (7.) 0      Sensory (8.) 0      Best Language (9.) 0      Dysarthria (10.) 0      Extinction and Inattention (11.) (Formerly Neglect) 0      Total 0                                              Medical Decision Making  Patient is a 63-year-old  female with a relevant past medical history of arthritis, thyroid disease, endometrial cancer, and uterine cancer presenting with abnormal brain MRI findings. She is asymptomatic with no findings on physical examination. Patient has a normal neurologic examination.  See ED course for interpretation of labs and brain MRI interpretation. Advised patient that this is not a final diagnosis and further workup is needed.   Reached out to neurocritical care and neurosurgery. Started patient on Keppra for seizure prophylaxis and Decadron. She was transferred to [facility] 2 at [facility] for neurosurgery consult.  Dispo: Patient transferred to [facility] under the care of Dr. Nicholson at [facility].    Amount and/or Complexity of Data Reviewed  External Data Reviewed: radiology and notes.  Labs: ordered. Decision-making details documented in ED Course.  Radiology:  Decision-making details documented in ED Course.    Risk  Prescription drug management.             Disposition  Final diagnoses:   Vasogenic brain edema (HCC)   Uncal herniation (HCC)   Midline shift of brain     Time reflects when diagnosis was documented in both MDM as applicable and the Disposition within this note       Time User Action Codes Description Comment    1/23/2024  4:24 PM Balaji Quezada Add [G93.6] Vasogenic brain edema (HCC)     1/23/2024  4:24 PM Benito Neil Add [G93.5] Uncal herniation (HCC)     1/23/2024  4:24 PM Benito Neil Add [R90.89] Midline shift of brain           ED Disposition       ED Disposition   Transfer to Another Facility-In Network    Condition   --    Date/Time   Tue Jan 23, 2024  4:25 PM    Comment   Mary JANIE HerringSymone should be transferred out to [facility].               MD Documentation      Flowsheet Row Most Recent Value   Patient Condition The patient has been stabilized such that within reasonable medical probability, no material deterioration of the patient  condition or the condition of the unborn child(kim) is likely to result from the transfer   Reason for Transfer Level of Care needed not available at this facility   Benefits of Transfer Specialized equipment and/or services available at the receiving facility (Include comment)________________________   Risks of Transfer Potential for delay in receiving treatment, Potential deterioration of medical condition, Loss of IV, Increased discomfort during transfer, Possible worsening of condition or death during transfer   Accepting Physician Dr. Lyn Neil   Provider Certification General risk, such as traffic hazards, adverse weather conditions, rough terrain or turbulence, possible failure of equipment (including vehicle or aircraft), or consequences of actions of persons outside the control of the transport personnel, Unanticipated needs of medical equipment and personnel during transport, Risk of worsening condition, The possibility of a transport vehicle being unavailable          Follow-up Information    None         Patient's Medications   Discharge Prescriptions    No medications on file       No discharge procedures on file.    PDMP Review       None            ED Provider  Electronically Signed by             Balaji Quezada PA-C  01/23/24 4579

## 2024-01-23 NOTE — EMTALA/ACUTE CARE TRANSFER
[facility] EMERGENCY DEPARTMENT  [address] [place] [id number]  Dept: [phone]      EMTALA TRANSFER CONSENT    NAME Mary Ashby                                         1960                              MRN [id number]    I have been informed of my rights regarding examination, treatment, and transfer   by Dr. Benito Neil,     Benefits:      Risks:        Consent for Transfer:  I acknowledge that my medical condition has been evaluated and explained to me by the emergency department physician or other qualified medical person and/or my attending physician, who has recommended that I be transferred to the service of    at  . The above potential benefits of such transfer, the potential risks associated with such transfer, and the probable risks of not being transferred have been explained to me, and I fully understand them.  The doctor has explained that, in my case, the benefits of transfer outweigh the risks.  I agree to be transferred.    I authorize the performance of emergency medical procedures and treatments upon me in both transit and upon arrival at the receiving facility.  Additionally, I authorize the release of any and all medical records to the receiving facility and request they be transported with me, if possible.  I understand that the safest mode of transportation during a medical emergency is an ambulance and that the Hospital advocates the use of this mode of transport. Risks of traveling to the receiving facility by car, including absence of medical control, life sustaining equipment, such as oxygen, and medical personnel has been explained to me and I fully understand them.    (KAREL CORRECT BOX BELOW)  [  ]  I consent to the stated transfer and to be transported by ambulance/helicopter.  [  ]  I consent to the stated transfer, but refuse transportation by ambulance and accept full responsibility for my transportation by car.  I understand the  risks of non-ambulance transfers and I exonerate the Hospital and its staff from any deterioration in my condition that results from this refusal.    X___________________________________________    DATE  24  TIME________  Signature of patient or legally responsible individual signing on patient behalf           RELATIONSHIP TO PATIENT_________________________          Provider Certification    NAME Mary Ashby                                         1960                              MRN [id number]    A medical screening exam was performed on the above named patient.  Based on the examination:    Condition Necessitating Transfer The primary encounter diagnosis was Vasogenic brain edema (HCC). Diagnoses of Uncal herniation (HCC) and Midline shift of brain were also pertinent to this visit.    Patient Condition:      Reason for Transfer:      Transfer Requirements: Facility     Space available and qualified personnel available for treatment as acknowledged by    Agreed to accept transfer and to provide appropriate medical treatment as acknowledged by          Appropriate medical records of the examination and treatment of the patient are provided at the time of transfer   STAFF INITIAL WHEN COMPLETED _______  Transfer will be performed by qualified personnel from    and appropriate transfer equipment as required, including the use of necessary and appropriate life support measures.    Provider Certification: I have examined the patient and explained the following risks and benefits of being transferred/refusing transfer to the patient/family:         Based on these reasonable risks and benefits to the patient and/or the unborn child(kim), and based upon the information available at the time of the patient’s examination, I certify that the medical benefits reasonably to be expected from the provision of appropriate medical treatments at another medical facility outweigh the increasing risks, if any, to  the individual’s medical condition, and in the case of labor to the unborn child, from effecting the transfer.    X____________________________________________ DATE 01/23/24        TIME_______      ORIGINAL - SEND TO MEDICAL RECORDS   COPY - SEND WITH PATIENT DURING TRANSFER

## 2024-01-23 NOTE — ED ATTENDING ATTESTATION
I supervised the Advanced Practitioner.? I performed, in its entirety, the assessment and plan component of the visit.  I agree with the Advanced Practitioner's note with the following assessment and plan:      Asymptomatic, MRI reviewed.  Patient has possible meningioma with surrounding vasogenic edema and midline shift.  It is reassuring the patient is currently asymptomatic.  She does describe episodes of forgetfulness/blank staring which could possibly be absence seizure's.  She says she had a dull headache over the last 2 days however it is no longer have a headache.    Discussed the findings on the MRI with patient that the radiologist suggested is a meningioma, understands that this is not a formal diagnosis however does require urgent neurosurgical consult.    Blood work to look for electrolyte abnormalities such as hyponatremia, renal failure, leukocytosis or leukopenia.    Benito Neil DO 01/23/24         ED Course         Critical Care Time  Procedures

## 2024-01-24 ENCOUNTER — APPOINTMENT (INPATIENT)
Dept: RADIOLOGY | Facility: HOSPITAL | Age: 64
DRG: 025 | End: 2024-01-24
Payer: COMMERCIAL

## 2024-01-24 ENCOUNTER — HOSPITAL ENCOUNTER (INPATIENT)
Facility: HOSPITAL | Age: 64
LOS: 9 days | Discharge: HOME | DRG: 025 | End: 2024-02-02
Attending: GENERAL PRACTICE | Admitting: INTERNAL MEDICINE
Payer: COMMERCIAL

## 2024-01-24 VITALS
OXYGEN SATURATION: 96 % | TEMPERATURE: 97.9 F | HEART RATE: 68 BPM | SYSTOLIC BLOOD PRESSURE: 129 MMHG | RESPIRATION RATE: 18 BRPM | DIASTOLIC BLOOD PRESSURE: 60 MMHG

## 2024-01-24 DIAGNOSIS — D32.9 MENINGIOMA (HCC): Primary | ICD-10-CM

## 2024-01-24 DIAGNOSIS — I10 PRIMARY HYPERTENSION: ICD-10-CM

## 2024-01-24 DIAGNOSIS — Z53.1 REFUSAL OF BLOOD TRANSFUSIONS AS PATIENT IS JEHOVAH'S WITNESS: ICD-10-CM

## 2024-01-24 DIAGNOSIS — K59.00 CONSTIPATION: ICD-10-CM

## 2024-01-24 PROBLEM — IMO0001 BLOOD TRANSFUSION DECLINED BECAUSE PATIENT IS JEHOVAH'S WITNESS: Status: ACTIVE | Noted: 2024-01-24

## 2024-01-24 PROBLEM — D72.819 LEUKOPENIA: Status: ACTIVE | Noted: 2024-01-24

## 2024-01-24 PROCEDURE — 74177 CT ABD & PELVIS W/CONTRAST: CPT

## 2024-01-24 PROCEDURE — 71260 CT THORAX DX C+: CPT

## 2024-01-24 PROCEDURE — 99223 1ST HOSP IP/OBS HIGH 75: CPT | Performed by: NEUROLOGICAL SURGERY

## 2024-01-24 PROCEDURE — G1004 CDSM NDSC: HCPCS

## 2024-01-24 PROCEDURE — 99223 1ST HOSP IP/OBS HIGH 75: CPT | Performed by: FAMILY MEDICINE

## 2024-01-24 PROCEDURE — 70496 CT ANGIOGRAPHY HEAD: CPT

## 2024-01-24 PROCEDURE — 99223 1ST HOSP IP/OBS HIGH 75: CPT | Performed by: INTERNAL MEDICINE

## 2024-01-24 RX ORDER — LEVOTHYROXINE SODIUM 0.07 MG/1
75 TABLET ORAL
Status: DISCONTINUED | OUTPATIENT
Start: 2024-01-24 | End: 2024-02-02 | Stop reason: HOSPADM

## 2024-01-24 RX ORDER — DOCUSATE SODIUM 100 MG/1
100 CAPSULE, LIQUID FILLED ORAL 2 TIMES DAILY
Status: DISCONTINUED | OUTPATIENT
Start: 2024-01-24 | End: 2024-01-30

## 2024-01-24 RX ORDER — DEXAMETHASONE SODIUM PHOSPHATE 4 MG/ML
4 INJECTION, SOLUTION INTRA-ARTICULAR; INTRALESIONAL; INTRAMUSCULAR; INTRAVENOUS; SOFT TISSUE EVERY 6 HOURS SCHEDULED
Status: DISCONTINUED | OUTPATIENT
Start: 2024-01-24 | End: 2024-01-24

## 2024-01-24 RX ORDER — LEVETIRACETAM 500 MG/1
500 TABLET ORAL EVERY 12 HOURS SCHEDULED
Status: DISCONTINUED | OUTPATIENT
Start: 2024-01-24 | End: 2024-01-24

## 2024-01-24 RX ORDER — FLUOXETINE HYDROCHLORIDE 20 MG/1
20 CAPSULE ORAL DAILY
Status: DISCONTINUED | OUTPATIENT
Start: 2024-01-24 | End: 2024-02-02 | Stop reason: HOSPADM

## 2024-01-24 RX ORDER — ACETAMINOPHEN 325 MG/1
650 TABLET ORAL EVERY 6 HOURS PRN
Status: DISCONTINUED | OUTPATIENT
Start: 2024-01-24 | End: 2024-02-02 | Stop reason: HOSPADM

## 2024-01-24 RX ORDER — ASPIRIN 81 MG/1
81 TABLET, CHEWABLE ORAL DAILY
COMMUNITY
End: 2024-02-02

## 2024-01-24 RX ORDER — LEVETIRACETAM 750 MG/1
750 TABLET ORAL EVERY 12 HOURS SCHEDULED
Status: DISCONTINUED | OUTPATIENT
Start: 2024-01-24 | End: 2024-01-30

## 2024-01-24 RX ORDER — DEXAMETHASONE 4 MG/1
4 TABLET ORAL EVERY 6 HOURS SCHEDULED
Status: DISCONTINUED | OUTPATIENT
Start: 2024-01-24 | End: 2024-01-30

## 2024-01-24 RX ORDER — HEPARIN SODIUM 5000 [USP'U]/ML
5000 INJECTION, SOLUTION INTRAVENOUS; SUBCUTANEOUS EVERY 8 HOURS SCHEDULED
Status: DISCONTINUED | OUTPATIENT
Start: 2024-01-24 | End: 2024-01-27

## 2024-01-24 RX ADMIN — DOCUSATE SODIUM 100 MG: 100 CAPSULE, LIQUID FILLED ORAL at 13:22

## 2024-01-24 RX ADMIN — HEPARIN SODIUM 5000 UNITS: 5000 INJECTION INTRAVENOUS; SUBCUTANEOUS at 16:50

## 2024-01-24 RX ADMIN — DOCUSATE SODIUM 100 MG: 100 CAPSULE, LIQUID FILLED ORAL at 16:50

## 2024-01-24 RX ADMIN — FLUOXETINE HYDROCHLORIDE 20 MG: 20 CAPSULE ORAL at 13:22

## 2024-01-24 RX ADMIN — IOHEXOL 100 ML: 350 INJECTION, SOLUTION INTRAVENOUS at 18:48

## 2024-01-24 RX ADMIN — DEXAMETHASONE 4 MG: 4 TABLET ORAL at 16:49

## 2024-01-24 RX ADMIN — LEVETIRACETAM 750 MG: 750 TABLET, FILM COATED ORAL at 21:09

## 2024-01-24 RX ADMIN — LEVOTHYROXINE SODIUM 75 MCG: 75 TABLET ORAL at 13:23

## 2024-01-24 NOTE — CONSULTS
Consultation - Medical Oncology   Mary Ashby 63 y.o. female MRN: [id number]  [facility] Encounter: [id number]    [facility]  Date of progress: 1/24/2024  Reason for Consult: Meningioma plan to prepare patient for surgery from hematology point of view because she is Jehovah witness.  HPI: Mary Ashby is a 63 y.o. year old female who presents with forgetfulness occasionally and some problem finding words occasionally.  No other symptoms.  In 1991 she had hysterectomy for cancer of the uterus and there was no excessive bleeding and she did not require radiation or chemotherapy.  When she was 18 years old she had appendectomy and again there was no bleeding problems.  History of hypothyroidism and depression.  Non-smoker.  No alcohol.  NKDA.    ROS:  01/24/24 Reviewed 12 systems: See symptoms in HPI  Presently no other neurological, cardiac, pulmonary, GI and  symptoms other than listed in HPI.  Other symptoms are in HPI.  No  fever, chills, bleeding, bone pains, skin rash, weight loss, night sweats, arthritic symptoms,  tiredness , weakness, numbness, claudication and gait problem. No frequent infections.  Not unusually sensitive to heat or cold. No swelling of the ankles. No swollen glands.  Patient is anxious.       Historical Information   Past Medical History:   Diagnosis Date    Arthritis     BRCA1 negative     Disease of thyroid gland     Endometrial cancer (HCC) 1991    Thyroid disease     Uterus cancer (HCC)      Past Surgical History:   Procedure Laterality Date    HYSTERECTOMY      still has ovaries     Social History   Social History     Substance and Sexual Activity   Alcohol Use Yes    Alcohol/week: 3.0 standard drinks of alcohol    Types: 3 Glasses of wine per week    Comment: social     Social History     Substance and Sexual Activity   Drug Use No     Social History     Tobacco Use   Smoking Status Former    Types: Cigarettes  "  Smokeless Tobacco Never     Family History:   Family History   Problem Relation Age of Onset    Breast cancer Mother 50    No Known Problems Father     Cancer Sister     No Known Problems Daughter     No Known Problems Maternal Grandmother     No Known Problems Maternal Grandfather     No Known Problems Paternal Grandmother     No Known Problems Paternal Grandfather     Lymphoma Brother     Melanoma Brother 50    No Known Problems Son     Skin cancer Maternal Aunt     Breast cancer Maternal Aunt     Colon cancer Paternal Uncle     Breast cancer Cousin          Current Facility-Administered Medications:     acetaminophen (TYLENOL) tablet 650 mg, 650 mg, Oral, Q6H PRN, Nighat Mejias MD    dexamethasone (DECADRON) tablet 4 mg, 4 mg, Oral, Q6H KEELY, Loreto Kang PA-C    docusate sodium (COLACE) capsule 100 mg, 100 mg, Oral, BID, Nighat Mejias MD, 100 mg at 01/24/24 1322    FLUoxetine (PROzac) capsule 20 mg, 20 mg, Oral, Daily, Nighat Mejias MD, 20 mg at 01/24/24 1322    levETIRAcetam (KEPPRA) tablet 750 mg, 750 mg, Oral, Q12H KEELY, Loreto Kang PA-C    levothyroxine tablet 75 mcg, 75 mcg, Oral, Early Morning, Nighat Mejias MD, 75 mcg at 01/24/24 1323    No Known Allergies    Physical Exam:  Vitals:    01/24/24 1013 01/24/24 1018 01/24/24 1534   BP:  167/78 128/65   Pulse:  69 71   Resp:  18 16   Temp:  98.5 °F (36.9 °C) 99.2 °F (37.3 °C)   TempSrc:  Oral    SpO2:  95% 94%   Weight: 62.6 kg (138 lb 0.1 oz) 62.6 kg (138 lb 0.1 oz)    Height: 5' 2\" (1.575 m) 5' 2\" (1.575 m)      Alert, oriented, not in distress, vitals are above, no icterus, no oral thrush, no palpable neck mass, clear lung fields, regular heart rate, abdomen  soft and non tender, no palpable abdominal mass, no ascites, no edema of ankles, no calf tenderness, no focal neurological deficit other than what is mentioned in HPI.  Presently she does not seem to have problem finding words or forgetfulness during this interview, no skin rash, no palpable " lymphadenopathy in the neck and axillary areas,  no clubbing.   Patient is anxious.  Performance status 0.      Lab Results: I have reviewed all pertinent labs.  LABS:    Results for orders placed or performed during the hospital encounter of 01/23/24   CBC and differential   Result Value Ref Range    WBC 4.78 4.31 - 10.16 Thousand/uL    RBC 4.62 3.81 - 5.12 Million/uL    Hemoglobin 14.2 11.5 - 15.4 g/dL    Hematocrit 42.3 34.8 - 46.1 %    MCV 92 82 - 98 fL    MCH 30.7 26.8 - 34.3 pg    MCHC 33.6 31.4 - 37.4 g/dL    RDW 12.1 11.6 - 15.1 %    MPV 10.1 8.9 - 12.7 fL    Platelets 152 149 - 390 Thousands/uL    nRBC 0 /100 WBCs    Neutrophils Relative 64 43 - 75 %    Immat GRANS % 0 0 - 2 %    Lymphocytes Relative 26 14 - 44 %    Monocytes Relative 8 4 - 12 %    Eosinophils Relative 1 0 - 6 %    Basophils Relative 1 0 - 1 %    Neutrophils Absolute 3.02 1.85 - 7.62 Thousands/µL    Immature Grans Absolute 0.02 0.00 - 0.20 Thousand/uL    Lymphocytes Absolute 1.26 0.60 - 4.47 Thousands/µL    Monocytes Absolute 0.40 0.17 - 1.22 Thousand/µL    Eosinophils Absolute 0.05 0.00 - 0.61 Thousand/µL    Basophils Absolute 0.03 0.00 - 0.10 Thousands/µL   Magnesium   Result Value Ref Range    Magnesium 2.2 1.9 - 2.7 mg/dL   Protime-INR   Result Value Ref Range    Protime 13.6 11.6 - 14.5 seconds    INR 1.03 0.84 - 1.19   APTT   Result Value Ref Range    PTT 40 (H) 23 - 37 seconds   Comprehensive metabolic panel   Result Value Ref Range    Sodium 136 135 - 147 mmol/L    Potassium 3.8 3.5 - 5.3 mmol/L    Chloride 100 96 - 108 mmol/L    CO2 28 21 - 32 mmol/L    ANION GAP 8 mmol/L    BUN 19 5 - 25 mg/dL    Creatinine 0.63 0.60 - 1.30 mg/dL    Glucose 98 65 - 140 mg/dL    Calcium 9.6 8.4 - 10.2 mg/dL    AST 23 13 - 39 U/L    ALT 21 7 - 52 U/L    Alkaline Phosphatase 81 34 - 104 U/L    Total Protein 7.4 6.4 - 8.4 g/dL    Albumin 4.6 3.5 - 5.0 g/dL    Total Bilirubin 0.70 0.20 - 1.00 mg/dL    eGFR 97 ml/min/1.73sq m     Reviewed above test  results.  PTT is slightly prolonged.    Imaging Studies: I have personally reviewed pertinent reports.     IMPRESSION:     4.3 cm probable meningioma in anterior aspect of left middle cranial fossa with small dural feeder vessels, diffuse surrounding vasogenic edema (left frontal, left subinsular, left posterior limb of internal capsule, left parietal, and left temporal   lobes), compressive mass effect on adjacent left temporal lobe and left lateral ventricle, 0.7 cm rightward midline shift, left uncal herniation, and rightward shift of upper brainstem. Recommend neurosurgical consultation for further evaluation.     NeuroQuant analysis was performed:   Normal study however this is confounded by a left middle cranial fossa mass and the results of this study are likely inconclusive but overall does not support neurodegeneration.     I personally instructed the MR technologist to send this patient to the emergency room for further evaluation at approximately 2:21 PM and to complete remainder of study.     I personally discussed this study with RUDOLPH MCKEON on 1/23/2024 3:11 PM.                 Workstation performed: [id number]        Imaging    MRI brain NeuroQuant wo and w contrast (Order: [id number]) - 1/23/2024    Pathology, and Other Studies: I have personally reviewed pertinent reports.        Assessment and Plan:  See diagnoses, orders instructions below  -Meningioma and patient will be going for brain surgery.  -Slightly increased PTT and she will have mixing study.  Hemoglobin is 14.2 and she does not need medication to make it go higher.   Patient will continue to follow with  primary physician and other consultants  All discussed in detail.  Questions answered..  Patient voiced understanding and agrees      Disclaimer: This document was prepared using a dictation device.  If a word or phrase is confusing, or does not make sense, this is likely due to recognition error which was not discovered during the  providers review. If you believe an error has occurred, please Contact me through [employer] Line service for cherry?cation.    Counseling / Coordination of Care  ..  Provided counseling and support

## 2024-01-24 NOTE — ASSESSMENT & PLAN NOTE
4.3 cm meningioma w/ edema and midline shift present on MRI it is an outpatient  Neurosurgery consultation  Keppra and Decadron  Hold aspirin.  Last dose yesterday morning  Neurochecks

## 2024-01-24 NOTE — ED CARE HANDOFF
Emergency Department Sign Out Note        Sign out and transfer of care from Dr Neil. See Separate Emergency Department note.     The patient, Mary Ashby, was evaluated by the previous provider for Brain tumor w/ herniation.    Workup Completed:        ED Course / Workup Pending (followup):                 Stroke Assessment       Row Name 01/23/24 1607             NIH Stroke Scale    Interval Baseline      Level of Consciousness (1a.) 0      LOC Questions (1b.) 0      LOC Commands (1c.) 0      Best Gaze (2.) 0      Visual (3.) 0      Facial Palsy (4.) 0      Motor Arm, Left (5a.) 0      Motor Arm, Right (5b.) 0      Motor Leg, Left (6a.) 0      Motor Leg, Right (6b.) 0      Limb Ataxia (7.) 0      Sensory (8.) 0      Best Language (9.) 0      Dysarthria (10.) 0      Extinction and Inattention (11.) (Formerly Neglect) 0      Total 0                                        ED Course as of 01/23/24 2203   Tue Jan 23, 2024   2142 Sign out:     Pt was sent from MRI for Meningioma w/ MLS and herniation  Pt received 10 mg Decadron and Keppra    Plan: transfer to [facility] to [facility] for Neurosurgery consult      Procedures  Medical Decision Making  Amount and/or Complexity of Data Reviewed  Labs: ordered.    Risk  Prescription drug management.            Disposition  Final diagnoses:   Vasogenic brain edema (HCC)   Uncal herniation (HCC)   Midline shift of brain     Time reflects when diagnosis was documented in both MDM as applicable and the Disposition within this note       Time User Action Codes Description Comment    1/23/2024  4:24 PM Balaji Quezada Add [G93.6] Vasogenic brain edema (HCC)     1/23/2024  4:24 PM Benito Neil Add [G93.5] Uncal herniation (HCC)     1/23/2024  4:24 PM Benito Neil Add [R90.89] Midline shift of brain           ED Disposition       ED Disposition   Transfer to Another Facility-In Network    Condition   --    Date/Time   Tue Jan 23, 2024  4:25 PM    Comment   Mary Ashby  should be transferred out to [place].               MD Documentation      Flowsheet Row Most Recent Value   Patient Condition The patient has been stabilized such that within reasonable medical probability, no material deterioration of the patient condition or the condition of the unborn child(kim) is likely to result from the transfer   Reason for Transfer Level of Care needed not available at this facility   Benefits of Transfer Specialized equipment and/or services available at the receiving facility (Include comment)________________________   Risks of Transfer Potential for delay in receiving treatment, Potential deterioration of medical condition, Loss of IV, Increased discomfort during transfer, Possible worsening of condition or death during transfer   Accepting Physician Dr. Lyn Neil   Provider Certification General risk, such as traffic hazards, adverse weather conditions, rough terrain or turbulence, possible failure of equipment (including vehicle or aircraft), or consequences of actions of persons outside the control of the transport personnel, Unanticipated needs of medical equipment and personnel during transport, Risk of worsening condition, The possibility of a transport vehicle being unavailable          Follow-up Information    None       Patient's Medications   Discharge Prescriptions    No medications on file     No discharge procedures on file.       ED Provider  Electronically Signed by     Kriss Guerra MD  01/23/24 6790

## 2024-01-24 NOTE — ASSESSMENT & PLAN NOTE
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

## 2024-01-24 NOTE — ASSESSMENT & PLAN NOTE
Patient with left frontal meningioma, presented with word finding difficulties for >1 month  Patient is [employer]'s Witness    Imaging reviewed personally and by attending. Final results as below  MRI 1/23/24: 4.3 cm probable meningioma in anterior aspect of left middle cranial fossa with surrounding edema    Plan  Continue regular neurologic checks   Decadron 4mg q6  Keppra 750 bid for seizure ppx  Ongoing medical management per primary team   Pain control per primary  Eval and mobilize per PT/OT  DVT PPX: SCDs, ok for dvt ppx    Surgical planning, will need cerebral angiogram tentatively Monday with surgical tumor resection Tuesday  Neurosurgery will continue to follow. Please call with any questions or concerns.

## 2024-01-24 NOTE — H&P
[facility]  H&P  Name: Mary Ashby 63 y.o. female I MRN: [id number]  Unit/Bed#: [facility] 718-01 I Date of Admission: 1/24/2024   Date of Service: 1/24/2024 I Hospital Day: 0      Assessment/Plan   * Meningioma (HCC)  Assessment & Plan  4.3 cm meningioma w/ edema and midline shift present on MRI it is an outpatient  Neurosurgery consultation  Keppra and Decadron  Hold aspirin.  Last dose yesterday morning  Neurochecks      Leukopenia  Assessment & Plan  Appears to be chronic.  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    Total Time Spent on Date of Encounter in care of patient: 65 mins. This time was spent on one or more of the following: performing physical exam; counseling and coordination of care; obtaining or reviewing history; documenting in the medical record; reviewing/ordering tests, medications or procedures; communicating with other healthcare professionals and discussing with patient's family/caregivers.    Chief Complaint: Abnormal brain MRI    History of Present Illness:  Mary Ashby is a 63 y.o. female with a PMH of hypothyroidism and depression who presents as a transfer [facility] due to abnormal MRI.  Patient reported that for the past 5 years or so she has intermittent word finding difficulties.  Which has been getting gradually worse recently.   Reported that she is under a lot of stress because she is a primary caregiver of for her  who has Parkinson's disease and also her aging parents.  Recently she noticed that she went to the wrong to take her medications and put them in her mouth but she did not know what to do was staring at the water..  It lasted for about 30 seconds before she realized that she has to have the water er with the medication.And patient reported that the day before that she was in her parents house but she was not making much sense and her dad asked her what is happening  Due to the concern for worsening word finding difficulty patient was seen by his PCP.  PCP sent her for an MRI.  Patient noted to have meningioma with vasogenic edema and midline shift.  Case was discussed with neurosurgery and started on Keppra and Decadron.  Transferred to [facility].  Currently she denies any specific complaints.  Patient denies any trouble with ambulation.  No urinary or bladder incontinence.  No change in her vision    Review of Systems:  Review of Systems   Constitutional: Negative.    HENT: Negative.     Eyes: Negative.    Respiratory: Negative.     Cardiovascular: Negative.    Gastrointestinal: Negative.    Endocrine: Negative.    Genitourinary: Negative.    Musculoskeletal: Negative.    Skin: Negative.    Allergic/Immunologic: Negative.    Neurological:  Positive for speech difficulty.   Hematological: Negative.    Psychiatric/Behavioral:  Positive for confusion. The patient is nervous/anxious.        Past Medical and Surgical History:   Past Medical History:   Diagnosis Date    Arthritis     BRCA1 negative     Disease of thyroid gland     Endometrial cancer (HCC) 1991    Thyroid disease     Uterus cancer (HCC)        Past Surgical History:   Procedure Laterality Date    HYSTERECTOMY      still has ovaries       Meds/Allergies:  Prior to Admission medications    Medication Sig Start Date End Date Taking? Authorizing Provider  "  aspirin 81 mg chewable tablet Chew 81 mg daily   Yes Historical Provider, MD   FLUoxetine (PROzac) 20 mg capsule Take 1 capsule (20 mg total) by mouth daily 1/16/24 7/14/24 Yes Sinan Palacios PA-C   levothyroxine 75 mcg tablet Take 1 tablet (75 mcg total) by mouth daily 9/15/23  Yes Sinan Palacios PA-C     I have reviewed home medications with patient personally.    Allergies: No Known Allergies    Social History:  Marital Status: /Civil Union   Occupation: retired   Patient Pre-hospital Living Situation: Home  Patient Pre-hospital Level of Mobility: walks  Patient Pre-hospital Diet Restrictions:   Substance Use History:   Social History     Substance and Sexual Activity   Alcohol Use Yes    Alcohol/week: 3.0 standard drinks of alcohol    Types: 3 Glasses of wine per week    Comment: social     Social History     Tobacco Use   Smoking Status Former    Types: Cigarettes   Smokeless Tobacco Never     Social History     Substance and Sexual Activity   Drug Use No       Family History:  Family History   Problem Relation Age of Onset    Breast cancer Mother 50    No Known Problems Father     Cancer Sister     No Known Problems Daughter     No Known Problems Maternal Grandmother     No Known Problems Maternal Grandfather     No Known Problems Paternal Grandmother     No Known Problems Paternal Grandfather     Lymphoma Brother     Melanoma Brother 50    No Known Problems Son     Skin cancer Maternal Aunt     Breast cancer Maternal Aunt     Colon cancer Paternal Uncle     Breast cancer Cousin        Physical Exam:     Vitals:   Blood Pressure: 128/65 (01/24/24 1534)  Pulse: 71 (01/24/24 1534)  Temperature: 99.2 °F (37.3 °C) (01/24/24 1534)  Temp Source: Oral (01/24/24 1018)  Respirations: 16 (01/24/24 1534)  Height: 5' 2\" (157.5 cm) (01/24/24 1018)  Weight - Scale: 62.6 kg (138 lb 0.1 oz) (01/24/24 1018)  SpO2: 94 % (01/24/24 1534)    Physical Exam  Constitutional:       General: She is not in acute distress.     " Appearance: She is not ill-appearing.   HENT:      Head: Normocephalic.      Nose: Nose normal.   Eyes:      General: No scleral icterus.  Cardiovascular:      Rate and Rhythm: Normal rate and regular rhythm.      Pulses: Normal pulses.      Heart sounds: Normal heart sounds.   Pulmonary:      Effort: Pulmonary effort is normal. No respiratory distress.      Breath sounds: Normal breath sounds.   Abdominal:      General: Abdomen is flat. There is no distension.      Tenderness: There is no abdominal tenderness.   Musculoskeletal:         General: No swelling. Normal range of motion.   Skin:     General: Skin is warm.      Coloration: Skin is not jaundiced.   Neurological:      Mental Status: She is alert. Mental status is at baseline.      Cranial Nerves: No cranial nerve deficit.          Additional Data:     Lab Results:  Results from last 7 days   Lab Units 01/23/24  1519   WBC Thousand/uL 4.78   HEMOGLOBIN g/dL 14.2   HEMATOCRIT % 42.3   PLATELETS Thousands/uL 152   NEUTROS PCT % 64   LYMPHS PCT % 26   MONOS PCT % 8   EOS PCT % 1     Results from last 7 days   Lab Units 01/23/24  1519   SODIUM mmol/L 136   POTASSIUM mmol/L 3.8   CHLORIDE mmol/L 100   CO2 mmol/L 28   BUN mg/dL 19   CREATININE mg/dL 0.63   ANION GAP mmol/L 8   CALCIUM mg/dL 9.6   ALBUMIN g/dL 4.6   TOTAL BILIRUBIN mg/dL 0.70   ALK PHOS U/L 81   ALT U/L 21   AST U/L 23   GLUCOSE RANDOM mg/dL 98     Results from last 7 days   Lab Units 01/23/24  1519   INR  1.03                   Lines/Drains:  Invasive Devices       Peripheral Intravenous Line  Duration             Peripheral IV 01/23/24 Right Antecubital 1 day                        Imaging: Reviewed radiology reports from this admission including: MRI brain  CTA head w wo contrast    (Results Pending)   CT chest abdomen pelvis w contrast    (Results Pending)   IR cerebral angiography / intervention    (Results Pending)       EKG and Other Studies Reviewed on Admission:   EKG: No EKG  obtained.    ** Please Note: This note has been constructed using a voice recognition system. **

## 2024-01-24 NOTE — CONSULTS
[facility]  Consult  Name: Mary Ashby 63 y.o. female I MRN: [id number]  Unit/Bed#: [facility] 718-01 I Date of Admission: 1/24/2024   Date of Service: 1/24/2024 I Hospital Day: 0    Inpatient consult to Neurosurgery  Consult performed by: Placido Lowery MD  Consult ordered by: Nighat Mejias MD          Assessment/Plan   * Meningioma (HCC)  Assessment & Plan  Patient with left frontal meningioma, presented with word finding difficulties for >1 month  Patient is [employer]    Imaging reviewed personally and by attending. Final results as below  MRI 1/23/24: 4.3 cm probable meningioma in anterior aspect of left middle cranial fossa with surrounding edema    Plan  Continue regular neurologic checks   Decadron 4mg q6  Keppra 750 bid for seizure ppx  Ongoing medical management per primary team   Pain control per primary  Eval and mobilize per PT/OT  DVT PPX: SCDs, ok for dvt ppx    Surgical planning, will need cerebral angiogram tentatively Monday with surgical tumor resection Tuesday  Neurosurgery will continue to follow. Please call with any questions or concerns.      Blood transfusion declined because patient is Jainism  Assessment & Plan  Patient provided with blood consent/refusal form  Hematology consulted to help optimize starting hemoglobin, appreciate recs  Hb currently 14         Attending discussed patient and reviewed images on 1/24/23   Patient personally accessed and examined on 1/24/23     History of Present Illness   History, ROS and PFSH obtained from patient and chart review    HPI: Mary Ashby is a 63 y.o. female with PMH including hypothyroidism, endometrial cancer, and anxiety who presented initially to her primary care provider with seldom intermittent word finding difficulties over the past 5 years that were worsening over the past month or so.  She had an outpatient MRI performed yesterday and was sent to the emergency room after the  radiology technician noticed midline shift on the exam.  She was then transferred to [facility] for neurosurgical evaluation, as her MRI showed a large meningioma.    Patient seen and examined at bedside with attending neurosurgeon, Dr. Lowery.  Patient reports she initially thought that her word finding difficulties were due to extreme stress in her life.  She denied headaches, visual changes, dizziness, weakness, and numbness.    MRI was personally reviewed with patient and her family and surgical options were discussed.  Risks, benefits, and alternatives to surgery were discussed.  Risk discussed included, but were not limited to, bleeding, infection, paralysis, stroke, speech difficulty, and death.  Patient is a Rastafari and therefore does not wish to accept blood products. Patient elected to proceed with cerebral angiogram with tumor resection.         Review of Systems   Eyes:  Negative for visual disturbance.   Respiratory:  Negative for shortness of breath.    Cardiovascular:  Negative for leg swelling.   Neurological:  Positive for speech difficulty. Negative for syncope, weakness, light-headedness and numbness.       Historical Information   Past Medical History:   Diagnosis Date    Arthritis     BRCA1 negative     Disease of thyroid gland     Endometrial cancer (HCC) 1991    Thyroid disease     Uterus cancer (HCC)      Past Surgical History:   Procedure Laterality Date    HYSTERECTOMY      still has ovaries     Social History     Substance and Sexual Activity   Alcohol Use Yes    Alcohol/week: 3.0 standard drinks of alcohol    Types: 3 Glasses of wine per week    Comment: social     Social History     Substance and Sexual Activity   Drug Use No     Social History     Tobacco Use   Smoking Status Former    Types: Cigarettes   Smokeless Tobacco Never     Family History   Problem Relation Age of Onset    Breast cancer Mother 50    No Known Problems Father     Cancer Sister     No Known  Problems Daughter     No Known Problems Maternal Grandmother     No Known Problems Maternal Grandfather     No Known Problems Paternal Grandmother     No Known Problems Paternal Grandfather     Lymphoma Brother     Melanoma Brother 50    No Known Problems Son     Skin cancer Maternal Aunt     Breast cancer Maternal Aunt     Colon cancer Paternal Uncle     Breast cancer Cousin        Meds/Allergies   current meds:   Current Facility-Administered Medications   Medication Dose Route Frequency    acetaminophen (TYLENOL) tablet 650 mg  650 mg Oral Q6H PRN    dexamethasone (DECADRON) tablet 4 mg  4 mg Oral Q6H KEELY    docusate sodium (COLACE) capsule 100 mg  100 mg Oral BID    FLUoxetine (PROzac) capsule 20 mg  20 mg Oral Daily    levETIRAcetam (KEPPRA) tablet 750 mg  750 mg Oral Q12H KEELY    levothyroxine tablet 75 mcg  75 mcg Oral Early Morning     No Known Allergies    Objective   I/O       None            Physical Exam  Constitutional:       General: She is not in acute distress.     Appearance: She is normal weight. She is not ill-appearing.   HENT:      Head: Normocephalic and atraumatic.   Eyes:      General: No visual field deficit.     Extraocular Movements: Extraocular movements intact and EOM normal.      Pupils: Pupils are equal, round, and reactive to light.   Pulmonary:      Effort: Pulmonary effort is normal. No respiratory distress.      Breath sounds: No stridor.   Skin:     General: Skin is warm.   Neurological:      Mental Status: She is alert and oriented to person, place, and time.      Cranial Nerves: Cranial nerves 2-12 are intact.      Motor: Motor strength is normal.  Psychiatric:         Speech: Speech normal.       Neurologic Exam     Mental Status   Oriented to person, place, and time.   Speech: speech is normal   Level of consciousness: alert  Knowledge: good.     Cranial Nerves   Cranial nerves II through XII intact.     CN II   Visual fields full to confrontation.     CN III, IV, VI   Pupils  "are equal, round, and reactive to light.  Extraocular motions are normal.     CN V   Facial sensation intact.     Motor Exam     Strength   Strength 5/5 throughout.     Sensory Exam   Light touch normal.       Vitals:Blood pressure 167/78, pulse 69, temperature 98.5 °F (36.9 °C), temperature source Oral, resp. rate 18, height 5' 2\" (1.575 m), weight 62.6 kg (138 lb 0.1 oz), SpO2 95%, not currently breastfeeding.,Body mass index is 25.24 kg/m².     Lab Results:   Results from last 7 days   Lab Units 01/23/24  1519   WBC Thousand/uL 4.78   HEMOGLOBIN g/dL 14.2   HEMATOCRIT % 42.3   PLATELETS Thousands/uL 152   NEUTROS PCT % 64   MONOS PCT % 8   EOS PCT % 1     Results from last 7 days   Lab Units 01/23/24  1519   POTASSIUM mmol/L 3.8   CHLORIDE mmol/L 100   CO2 mmol/L 28   BUN mg/dL 19   CREATININE mg/dL 0.63   CALCIUM mg/dL 9.6   ALK PHOS U/L 81   ALT U/L 21   AST U/L 23     Results from last 7 days   Lab Units 01/23/24  1519   MAGNESIUM mg/dL 2.2         Results from last 7 days   Lab Units 01/23/24  1519   INR  1.03   PTT seconds 40*     No results found for: \"TROPONINT\"  ABG:No results found for: \"PHART\", \"QIH2GEA\", \"PO2ART\", \"KHC6AJC\", \"Y4BLPPKL\", \"BEART\", \"SOURCE\"    Imaging Studies: I have personally reviewed pertinent films in PACS    EKG, Pathology, and Other Studies: I have personally reviewed pertinent films in PACS    VTE Prophylaxis: Sequential compression device (Venodyne)     Code Status: Level 1 - Full Code  Advance Directive and Living Will:      Power of :    POLST:      Counseling / Coordination of Care  I spent 45 minutes with the patient.    PLEASE NOTE:  This encounter may have been completed utilizing the M- Modal/Fluency Direct Speech Voice Recognition Software. Grammatical errors, random word insertions, pronoun errors and incomplete sentences are occasional consequences of the system due to software limitations, ambient noise and hardware issues.These may be missed even after proof " reading prior to affixing electronic signature. Please do not hesitate to contact me directly if you have any questions or concerns about the content, text or information contained within the body of this dictation.

## 2024-01-24 NOTE — ASSESSMENT & PLAN NOTE
Patient provided with blood consent/refusal form  Hematology consulted to help optimize starting hemoglobin, appreciate recs  Hb currently 14

## 2024-01-24 NOTE — PLAN OF CARE
Problem: PAIN - ADULT  Goal: Verbalizes/displays adequate comfort level or baseline comfort level  Description: Interventions:  - Encourage patient to monitor pain and request assistance  - Assess pain using appropriate pain scale  - Administer analgesics based on type and severity of pain and evaluate response  - Implement non-pharmacological measures as appropriate and evaluate response  - Consider cultural and social influences on pain and pain management  - Notify physician/advanced practitioner if interventions unsuccessful or patient reports new pain  Outcome: Progressing     Problem: SAFETY ADULT  Goal: Patient will remain free of falls  Description: INTERVENTIONS:  - Educate patient/family on patient safety including physical limitations  - Instruct patient to call for assistance with activity   - Consult OT/PT to assist with strengthening/mobility   - Keep Call bell within reach  - Keep bed low and locked with side rails adjusted as appropriate  - Keep care items and personal belongings within reach  - Initiate and maintain comfort rounds  - Make Fall Risk Sign visible to staff  - Offer Toileting every 3 Hours, in advance of need  - Initiate/Maintain bed alarm  - Obtain necessary fall risk management equipment:   - Apply yellow socks and bracelet for high fall risk patients  - Consider moving patient to room near nurses station  Outcome: Progressing  Goal: Maintain or return to baseline ADL function  Description: INTERVENTIONS:  -  Assess patient's ability to carry out ADLs; assess patient's baseline for ADL function and identify physical deficits which impact ability to perform ADLs (bathing, care of mouth/teeth, toileting, grooming, dressing, etc.)  - Assess/evaluate cause of self-care deficits   - Assess range of motion  - Assess patient's mobility; develop plan if impaired  - Assess patient's need for assistive devices and provide as appropriate  - Encourage maximum independence but intervene and  supervise when necessary  - Involve family in performance of ADLs  - Assess for home care needs following discharge   - Consider OT consult to assist with ADL evaluation and planning for discharge  - Provide patient education as appropriate  Outcome: Progressing  Goal: Maintains/Returns to pre admission functional level  Description: INTERVENTIONS:  - Perform AM-PAC 6 Click Basic Mobility/ Daily Activity assessment daily.  - Set and communicate daily mobility goal to care team and patient/family/caregiver.   - Collaborate with rehabilitation services on mobility goals if consulted  - Perform Range of Motion 3 times a day.  - Reposition patient every 3 hours.  - Dangle patient 3 times a day  - Stand patient 3 times a day  - Ambulate patient 3 times a day  - Out of bed to chair 3 times a day   - Out of bed for meals 3 times a day  - Out of bed for toileting  - Record patient progress and toleration of activity level   Outcome: Progressing     Problem: DISCHARGE PLANNING  Goal: Discharge to home or other facility with appropriate resources  Description: INTERVENTIONS:  - Identify barriers to discharge w/patient and caregiver  - Arrange for needed discharge resources and transportation as appropriate  - Identify discharge learning needs (meds, wound care, etc.)  - Arrange for interpretive services to assist at discharge as needed  - Refer to Case Management Department for coordinating discharge planning if the patient needs post-hospital services based on physician/advanced practitioner order or complex needs related to functional status, cognitive ability, or social support system  Outcome: Progressing     Problem: Knowledge Deficit  Goal: Patient/family/caregiver demonstrates understanding of disease process, treatment plan, medications, and discharge instructions  Description: Complete learning assessment and assess knowledge base.  Interventions:  - Provide teaching at level of understanding  - Provide teaching via  preferred learning methods  Outcome: Progressing     Problem: NEUROSENSORY - ADULT  Goal: Achieves stable or improved neurological status  Description: INTERVENTIONS  - Monitor and report changes in neurological status  - Monitor vital signs such as temperature, blood pressure, glucose, and any other labs ordered   - Initiate measures to prevent increased intracranial pressure  - Monitor for seizure activity and implement precautions if appropriate      Outcome: Progressing  Goal: Remains free of injury related to seizures activity  Description: INTERVENTIONS  - Maintain airway, patient safety  and administer oxygen as ordered  - Monitor patient for seizure activity, document and report duration and description of seizure to physician/advanced practitioner  - If seizure occurs,  ensure patient safety during seizure  - Reorient patient post seizure  - Seizure pads on all 4 side rails  - Instruct patient/family to notify RN of any seizure activity including if an aura is experienced  - Instruct patient/family to call for assistance with activity based on nursing assessment  - Administer anti-seizure medications if ordered    Outcome: Progressing  Goal: Achieves maximal functionality and self care  Description: INTERVENTIONS  - Monitor swallowing and airway patency with patient fatigue and changes in neurological status  - Encourage and assist patient to increase activity and self care.   - Encourage visually impaired, hearing impaired and aphasic patients to use assistive/communication devices  Outcome: Progressing

## 2024-01-25 ENCOUNTER — APPOINTMENT (OUTPATIENT)
Dept: RADIOLOGY | Facility: HOSPITAL | Age: 64
DRG: 025 | End: 2024-01-25
Payer: COMMERCIAL

## 2024-01-25 PROBLEM — K76.9 HEPATIC LESION: Status: ACTIVE | Noted: 2024-01-25

## 2024-01-25 PROBLEM — R93.7 ABNORMAL CT SCAN, LUMBAR SPINE: Status: ACTIVE | Noted: 2024-01-25

## 2024-01-25 PROBLEM — R91.8 LUNG NODULES: Status: ACTIVE | Noted: 2024-01-25

## 2024-01-25 LAB
ALBUMIN SERPL BCP-MCNC: 4.4 G/DL (ref 3.5–5)
ALP SERPL-CCNC: 65 U/L (ref 34–104)
ALT SERPL W P-5'-P-CCNC: 22 U/L (ref 7–52)
ANION GAP SERPL CALCULATED.3IONS-SCNC: 9 MMOL/L
APTT PPP: 37 SECONDS (ref 23–37)
AST SERPL W P-5'-P-CCNC: 19 U/L (ref 13–39)
BILIRUB SERPL-MCNC: 0.73 MG/DL (ref 0.2–1)
BUN SERPL-MCNC: 15 MG/DL (ref 5–25)
CALCIUM SERPL-MCNC: 9.5 MG/DL (ref 8.4–10.2)
CHLORIDE SERPL-SCNC: 104 MMOL/L (ref 96–108)
CO2 SERPL-SCNC: 27 MMOL/L (ref 21–32)
CREAT SERPL-MCNC: 0.53 MG/DL (ref 0.6–1.3)
ERYTHROCYTE [DISTWIDTH] IN BLOOD BY AUTOMATED COUNT: 12.5 % (ref 11.6–15.1)
GFR SERPL CREATININE-BSD FRML MDRD: 102 ML/MIN/1.73SQ M
GLUCOSE SERPL-MCNC: 129 MG/DL (ref 65–140)
HCT VFR BLD AUTO: 44.1 % (ref 34.8–46.1)
HGB BLD-MCNC: 14.5 G/DL (ref 11.5–15.4)
MCH RBC QN AUTO: 30.3 PG (ref 26.8–34.3)
MCHC RBC AUTO-ENTMCNC: 32.9 G/DL (ref 31.4–37.4)
MCV RBC AUTO: 92 FL (ref 82–98)
PLATELET # BLD AUTO: 174 THOUSANDS/UL (ref 149–390)
PMV BLD AUTO: 10.2 FL (ref 8.9–12.7)
POTASSIUM SERPL-SCNC: 4 MMOL/L (ref 3.5–5.3)
PROT SERPL-MCNC: 7 G/DL (ref 6.4–8.4)
RBC # BLD AUTO: 4.79 MILLION/UL (ref 3.81–5.12)
SODIUM SERPL-SCNC: 140 MMOL/L (ref 135–147)
WBC # BLD AUTO: 10.77 THOUSAND/UL (ref 4.31–10.16)

## 2024-01-25 PROCEDURE — 80053 COMPREHEN METABOLIC PANEL: CPT | Performed by: FAMILY MEDICINE

## 2024-01-25 PROCEDURE — A9585 GADOBUTROL INJECTION: HCPCS | Performed by: PHYSICIAN ASSISTANT

## 2024-01-25 PROCEDURE — 99232 SBSQ HOSP IP/OBS MODERATE 35: CPT | Performed by: NEUROLOGICAL SURGERY

## 2024-01-25 PROCEDURE — 97166 OT EVAL MOD COMPLEX 45 MIN: CPT

## 2024-01-25 PROCEDURE — 74183 MRI ABD W/O CNTR FLWD CNTR: CPT

## 2024-01-25 PROCEDURE — 72158 MRI LUMBAR SPINE W/O & W/DYE: CPT

## 2024-01-25 PROCEDURE — 97162 PT EVAL MOD COMPLEX 30 MIN: CPT

## 2024-01-25 PROCEDURE — 99232 SBSQ HOSP IP/OBS MODERATE 35: CPT | Performed by: INTERNAL MEDICINE

## 2024-01-25 PROCEDURE — 85027 COMPLETE CBC AUTOMATED: CPT | Performed by: FAMILY MEDICINE

## 2024-01-25 PROCEDURE — 85732 THROMBOPLASTIN TIME PARTIAL: CPT | Performed by: STUDENT IN AN ORGANIZED HEALTH CARE EDUCATION/TRAINING PROGRAM

## 2024-01-25 RX ORDER — GADOBUTROL 604.72 MG/ML
6 INJECTION INTRAVENOUS
Status: COMPLETED | OUTPATIENT
Start: 2024-01-25 | End: 2024-01-25

## 2024-01-25 RX ADMIN — HEPARIN SODIUM 5000 UNITS: 5000 INJECTION INTRAVENOUS; SUBCUTANEOUS at 21:40

## 2024-01-25 RX ADMIN — DEXAMETHASONE 4 MG: 4 TABLET ORAL at 00:19

## 2024-01-25 RX ADMIN — HEPARIN SODIUM 5000 UNITS: 5000 INJECTION INTRAVENOUS; SUBCUTANEOUS at 13:02

## 2024-01-25 RX ADMIN — LEVETIRACETAM 750 MG: 750 TABLET, FILM COATED ORAL at 09:01

## 2024-01-25 RX ADMIN — LEVOTHYROXINE SODIUM 75 MCG: 75 TABLET ORAL at 06:13

## 2024-01-25 RX ADMIN — DEXAMETHASONE 4 MG: 4 TABLET ORAL at 13:02

## 2024-01-25 RX ADMIN — DEXAMETHASONE 4 MG: 4 TABLET ORAL at 17:12

## 2024-01-25 RX ADMIN — LEVETIRACETAM 750 MG: 750 TABLET, FILM COATED ORAL at 21:39

## 2024-01-25 RX ADMIN — DEXAMETHASONE 4 MG: 4 TABLET ORAL at 06:13

## 2024-01-25 RX ADMIN — HEPARIN SODIUM 5000 UNITS: 5000 INJECTION INTRAVENOUS; SUBCUTANEOUS at 00:19

## 2024-01-25 RX ADMIN — HEPARIN SODIUM 5000 UNITS: 5000 INJECTION INTRAVENOUS; SUBCUTANEOUS at 06:13

## 2024-01-25 RX ADMIN — FLUOXETINE HYDROCHLORIDE 20 MG: 20 CAPSULE ORAL at 09:01

## 2024-01-25 RX ADMIN — GADOBUTROL 6 ML: 604.72 INJECTION INTRAVENOUS at 18:24

## 2024-01-25 RX ADMIN — DOCUSATE SODIUM 100 MG: 100 CAPSULE, LIQUID FILLED ORAL at 09:01

## 2024-01-25 NOTE — ASSESSMENT & PLAN NOTE
Multiple ill-defined foci of enhancement in the lumbar paraspinal musculature, at least 1 of these measuring 1.6 cm does appear somewhat rounded and masslike. Metastatic disease cannot be excluded. The more superior of these lesions should be   included in the field-of-view on the recommended abdominal MRI which may aid in further characterization. PET/CT may also be considered for further assessment.    Patient with history of uterus cancer status post hysterectomy  Hematology oncology is following    Outpatient further workup

## 2024-01-25 NOTE — CASE MANAGEMENT
Case Management Discharge Planning Note    Patient name Mary Ashby  Location [facility] 718-01 MRN [id number]  : 1960 Date 2024       Current Admission Date: 2024  Current Admission Diagnosis:Meningioma (HCC)   Patient Active Problem List    Diagnosis Date Noted    Lung nodules 2024    Hepatic lesion 2024    Abnormal CT scan, lumbar spine 2024    Blood transfusion declined because patient is Christian 2024    Leukopenia 2024    Meningioma (HCC) 2024    Anxiety 2023    Encounter for gynecological examination (general) (routine) without abnormal findings 2022    Lipid screening 2018    Need for hepatitis C screening test 2018    Hypothyroidism 2016      LOS (days): 1  Geometric Mean LOS (GMLOS) (days): 4  Days to GMLOS:2.8     OBJECTIVE:  Risk of Unplanned Readmission Score: 7.04         Current admission status: Inpatient   Preferred Pharmacy:   [email or web address] [place] [place] - [address]  [address]  [address]  [facility] [address]  Phone: [phone] Fax: [phone]    Primary Care Provider: Sinan Palacios PA-C    Primary Insurance: [employer]  Secondary Insurance:     DISCHARGE DETAILS:                    Additional Comments: Per provider pt will remain IP, scheduled angiogram on

## 2024-01-25 NOTE — UTILIZATION REVIEW
Initial Clinical Review    Admission: Date/Time/Statement:   Admission Orders (From admission, onward)       Ordered        01/24/24 0949  Inpatient Admission  Once                          Orders Placed This Encounter   Procedures    Inpatient Admission     Standing Status:   Standing     Number of Occurrences:   1     Order Specific Question:   Level of Care     Answer:   Med Surg [16]     Order Specific Question:   Estimated length of stay     Answer:   More than 2 Midnights     Order Specific Question:   Certification     Answer:   I certify that inpatient services are medically necessary for this patient for a duration of greater than two midnights. See H&P and MD Progress Notes for additional information about the patient's course of treatment.     1/23/2024 - 1/24/2024 (17 hours)  [facility] Emergency Department  Outpatient MRI :  Vasogenic edema, uncal hernia, midline shift of brain, meningioma.   Pmhx: endometrial and urerine cancer. Intermittent receptive aphasia  Received iv dexamethasone and iv keppra  Discharge to [facility] for higher level of care    Initial Presentation: 63 y.o. female received from [facility] ed for inpatient med surg admission to further evaluate and treat memory loss ( forgot what she was doing while taking her medication).  Outpatient imaging identified meningioma, vasogenic edema and midline shift. Clinical assessment significant for hypertension. GCS=15. Plan includes: neurosurgery consult, oncology consult keppra, decadron, hold aspirin. Neuro checks.     Consult neuro surgery :Given the size and location as well as her symptomatology I believe that the mass requires surgical resection with a left pterional craniotomy and resection of mass.  We also discussed the role of preoperative embolization to decrease perioperative blood loss Of note, she is [employer] and has declined the use of blood products.  We did explain in detail that this  is a highly vascular lesion.  However, with embolization and careful resection I do believe I can safely provide surgery without the use of blood products.     Consult medical oncology:  Slightly increased PTT and she will have mixing study.     Date: 1-25-24    Day 2: inpatient med surg   Plan includes : equal mix , APTT, CBC  , CMP, neuro checks, PT/OT evaluations, Neuro surgery on 1-30-24       Date/Time: 01/30/24 1120         Procedure: CRANIOTOMY IMAGE-GUIDED FOR TUMOR (Left: Head)   Anesthesia type: General   Diagnosis: Meningioma (HCC) [D32.9]   Pre-op diagnosis: Meningioma (HCC) [D32.9]     ED Triage Vitals   01/24/24 1018 01/24/24 1018 01/24/24 1018 01/24/24 1018 01/24/24 1018   98.5 °F (36.9 °C) 69 18 167/78 95 %      Oral Monitor         No Pain          01/24/24 62.6 kg (138 lb 0.1 oz)     Additional Vital Signs:       Date/Time Temp Pulse Resp BP MAP (mmHg) SpO2 O2 Device   01/25/24 0800 -- -- -- -- -- -- None (Room air)   01/25/24 07:20:29 98.4 °F (36.9 °C) 62 16 130/77 95 97 % --   01/24/24 15:34:38 99.2 °F (37.3 °C) 71 16 128/65 86 94 % --   01/24/24 1018 98.5 °F (36.9 °C) 69 18 167/78 108 95 % --   01/24/24 1000 -- -- -- -- -- -- None (Room air)     Date and Time Eye Opening Best Verbal Response Best Motor Response Joe Coma Scale Score   01/25/24 0800 4 5 6 15   01/25/24 0000 4 5 6 15   01/24/24 2100 4 5 6 15   01/24/24 1600 4 5 6 15   01/24/24 1000 4 5 6 15   01/24/24 0744 4 5 6 15       Pertinent Labs/Diagnostic Test Results:     CTA head w wo contrast   Final (01/25 0819)         1. Large, 3.6 cm dural based extra-axial mass with associated enhancement in the left middle cranial fossa correlates to the MRI performed the day earlier. There is surrounding vasogenic edema and 0.7 cm of associated right-sided subfalcine herniation. Imaging features are most compatible with meningioma.      2. Exuberant arterial supply to the left middle cranial fossa meningioma likely from the left middle  meningeal artery which is slightly hypertrophied on the left compared to the right. A prominent draining cortical vein empties some of the enhancement of    the tumor along the superior margin draining into a cortical vein running over the anterior lateral aspects of the left frontal lobe. Significant associated mass effect in the left MCA bifurcation region and left MCA candelabra which are elevated along the posterior superior margin of this mass.      CT chest abdomen pelvis w contrast   Final  (01/25 0851)      1.  No evidence of a primary malignancy in the chest, abdomen, or pelvis.   2.  Multiple bilateral pulmonary nodules measuring 4 mm or less are indeterminate in the absence of comparison imaging. Given ongoing malignancy work-up, recommend follow-up chest CT in 3 months to exclude metastatic disease.   3.  Multiple hypoattenuating hepatic lesions similarly are indeterminate in the absence of comparison imaging. The largest of these, approximately 1 cm, measure slightly above fluid density. In light of ongoing malignancy work-up, recommend further    characterization with contrast-enhanced abdominal MRI.   4.  Multiple ill-defined foci of enhancement in the lumbar paraspinal musculature, at least 1 of these measuring 1.6 cm does appear somewhat rounded and masslike. Metastatic disease cannot be excluded. The more superior of these lesions should be included in the field-of-view on the recommended abdominal MRI which may aid in further characterization. PET/CT may also be considered for further assessment.         Results from last 7 days   Lab Units 01/25/24  0603 01/23/24  1519   WBC Thousand/uL 10.77* 4.78   HEMOGLOBIN g/dL 14.5 14.2   HEMATOCRIT % 44.1 42.3   PLATELETS Thousands/uL 174 152   NEUTROS ABS Thousands/µL  --  3.02         Results from last 7 days   Lab Units 01/25/24  0603 01/23/24  1519   SODIUM mmol/L 140 136   POTASSIUM mmol/L 4.0 3.8   CHLORIDE mmol/L 104 100   CO2 mmol/L 27 28   ANION  GAP mmol/L 9 8   BUN mg/dL 15 19   CREATININE mg/dL 0.53* 0.63   EGFR ml/min/1.73sq m 102 97   CALCIUM mg/dL 9.5 9.6   MAGNESIUM mg/dL  --  2.2     Results from last 7 days   Lab Units 01/25/24  0603 01/23/24  1519   AST U/L 19 23   ALT U/L 22 21   ALK PHOS U/L 65 81   TOTAL PROTEIN g/dL 7.0 7.4   ALBUMIN g/dL 4.4 4.6   TOTAL BILIRUBIN mg/dL 0.73 0.70         Results from last 7 days   Lab Units 01/25/24  0603 01/23/24  1519   GLUCOSE RANDOM mg/dL 129 98     Results from last 7 days   Lab Units 01/25/24  0603 01/23/24  1519   PROTIME seconds  --  13.6   INR   --  1.03   PTT seconds 37 40*     ED Treatment:   Medication Administration - No Administrations Displayed (No Start Event Found)       None          Past Medical History:   Diagnosis Date    Arthritis     BRCA1 negative     Disease of thyroid gland     Endometrial cancer (HCC) 1991    Thyroid disease     Uterus cancer (HCC)      Present on Admission:   Hypothyroidism   Anxiety      Admitting Diagnosis: Vasogenic brain edema (HCC) [G93.6]    Age/Sex: 63 y.o. female    Scheduled Medications:    dexamethasone, 4 mg, Oral, Q6H KEELY  docusate sodium, 100 mg, Oral, BID  FLUoxetine, 20 mg, Oral, Daily  heparin (porcine), 5,000 Units, Subcutaneous, Q8H KEELY  levETIRAcetam, 750 mg, Oral, Q12H KEELY  levothyroxine, 75 mcg, Oral, Early Morning      Continuous IV Infusions:     PRN Meds:  acetaminophen, 650 mg, Oral, Q6H PRN        IP CONSULT TO NEUROSURGERY  IP CONSULT TO HEMATOLOGY    Network Utilization Review Department  ATTENTION: Please call with any questions or concerns to [phone] and carefully listen to the prompts so that you are directed to the right person. All voicemails are confidential.   For Discharge needs, contact Care Management DC Support Team at [phone] opt. [phone]  Send all requests for admission clinical reviews, approved or denied determinations and any other requests to dedicated fax number below belonging to the campus where the patient is  receiving treatment. List of dedicated fax numbers for the Facilities:  FACILITY NAME UR FAX NUMBER   ADMISSION DENIALS (Administrative/Medical Necessity) [phone]   DISCHARGE SUPPORT TEAM (NETWORK) [phone]   PARENT CHILD HEALTH (Maternity/NICU/Pediatrics) [phone]   [facility] [phone]   [facility] [phone]   [facility] [phone]   [facility] [phone]   [facility] [phone]   [facility] [phone]   [facility] [phone]   [facility] [phone]   [facility] [phone]   [facility] [phone]   [facility] [phone]

## 2024-01-25 NOTE — PHYSICAL THERAPY NOTE
Physical Therapy Evaluation     Patient's Name: Mary Ashby    Admitting Diagnosis  Vasogenic brain edema (HCC) [G93.6]    Problem List  Patient Active Problem List   Diagnosis    Hypothyroidism    Lipid screening    Need for hepatitis C screening test    Encounter for gynecological examination (general) (routine) without abnormal findings    Anxiety    Meningioma (HCC)    Blood transfusion declined because patient is Taoist    Leukopenia    Lung nodules    Hepatic lesion    Abnormal CT scan, lumbar spine       Past Medical History  Past Medical History:   Diagnosis Date    Arthritis     BRCA1 negative     Disease of thyroid gland     Endometrial cancer (HCC) 1991    Thyroid disease     Uterus cancer (HCC)        Past Surgical History  Past Surgical History:   Procedure Laterality Date    HYSTERECTOMY      still has ovaries          01/25/24 0837   PT Last Visit   PT Visit Date 01/25/24   Note Type   Note type Evaluation   Pain Assessment   Pain Assessment Tool 0-10   Pain Score No Pain   Restrictions/Precautions   Weight Bearing Precautions Per Order No   Other Precautions Multiple lines   Home Living   Type of Home House   Home Layout One level;Access;Stairs to enter with rails;Performs ADLs on one level  (2STE)   Bathroom Shower/Tub Walk-in shower  (+ tub/shower)   Bathroom Toilet Standard   Bathroom Equipment Shower chair;Commode   Bathroom Accessibility Accessible   Home Equipment Walker;Cane  (available, but does not use PTA)   Additional Comments patient planning to move in with daughter on d/c. Above is daughter's home set up.   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 - plan for extended family to provide care for pt's spouse on d/c)   General   Family/Caregiver Present No   Cognition   Overall Cognitive Status WFL   Arousal/Participation Alert   Orientation Level Oriented X4   Memory Within functional limits   Following Commands Follows all commands and directions without difficulty   Comments Patient pleasant and cooperative   Subjective   Subjective Patient agreeable to PT eval   RUE Assessment   RUE Assessment WFL   LUE Assessment   LUE Assessment WFL   RLE Assessment   RLE Assessment WFL   LLE Assessment   LLE Assessment WFL   Bed Mobility   Supine to Sit 6  Modified independent   Transfers   Sit to Stand 6  Modified independent   Stand to Sit 6  Modified independent   Additional Comments no AD   Ambulation/Elevation   Gait pattern WNL   Gait Assistance 6  Modified independent   Assistive Device None   Distance 250'   Stair Management Assistance   (declines)   Balance   Static Sitting Normal   Dynamic Sitting Good   Static Standing Fair +   Dynamic Standing Fair   Ambulatory Fair   Endurance Deficit   Endurance Deficit No   Activity Tolerance   Activity Tolerance Patient tolerated treatment well   Medical Staff Made Aware OT   Nurse Made Aware RN cleared   Assessment   Prognosis Good   Assessment Pt is a 63 y.o. female seen for a moderate complexity PT evaluation due to Ongoing medical management for primary dx. Patient is s/p admit to [facility] on 1/24/2024 for Vasogenic brain edema (HCC) (G93.6). Patient  has a past medical history of Arthritis, BRCA1 negative, Disease of thyroid gland, Endometrial cancer (HCC), Thyroid disease, and Uterus cancer (HCC)..     PT now consulted to assess functional mobility and needs for safe d/c planning. pt independent with functional mobility, independent ADLs, and independent IADLs. Personal factors affecting status include steps to enter home and medical status.  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     Currently pt requires modified independent for bed mobility, modified independent for functional transfers with no AD ; modified independent for ambulation with no AD. Pt presents functioning at or near baseline level of function. Patient states no concerns with functional mobility at present. Patient is encouraged to continue ambulating with staff assist as able in order to maintain current LOF.     The patient's AM-PAC Basic Mobility Inpatient Short Form Raw Score Is 24. PT is currently recommending no post acute rehab needs on d/c from hospital. Will reassess post op as able. Due to patient current status, plan to sign off formal inpatient PT services at this time. Please re-consult should current status change.   Barriers to Discharge None   Goals   Patient Goals to have a successful surgery   PT Treatment Day 0   Plan   Treatment/Interventions   (d/c PT)   PT Frequency   (d/c PT)   Discharge Recommendation   Rehab Resource Intensity Level, PT No post-acute rehabilitation needs  (will reassess post op as able)   AM-PAC Basic Mobility Inpatient   Turning in Flat Bed Without Bedrails 4   Lying on Back to Sitting on Edge of Flat Bed Without Bedrails 4   Moving Bed to Chair 4   Standing Up From Chair Using Arms 4   Walk in Room 4   Climb 3-5 Stairs With Railing 4   Basic Mobility Inpatient Raw Score 24   Basic Mobility Standardized Score 57.68   Highest Level Of Mobility   JH-HLM Goal 8: Walk 250 feet or more   JH-HLM Achieved 8: Walk 250 feet ot more   Modified [place] Scale   Modified [place] Scale 2   End of Consult   Patient Position at End of Consult All needs within reach;Bedside chair         Connie Oliva, PT, DPT

## 2024-01-25 NOTE — PROGRESS NOTES
Medical Oncology/Hematology Progress Note  Mary Ashby, female, 63 y.o., 1960,  PPHP 718/PPHP [id number], [id number]     Assessment and Plan    Ms. Mary Ashby is a 63-year-old female with PMHx of hypothyroidism, endometrial cancer, depression, and anxiety who initially presented to PCP with intermittent forgetfulness and word-finding difficulties for past 5 years that progressively worsened over last month. Patient had midline shift on outpatient MRI and was sent for ED evaluation. Patient was transferred to [facility] due to large meningioma and neurosurgery evaluation. Medical oncology/hematology was consulted for hematology clearance prior to neurosurgical intervention in setting of refusal of blood products because patient is [employer].     1. Elevated PTT  No known history of bleeding complications   Hgb has been stable, Hgb 14.5 today  Slightly elevated PTT of 40 on 1/23  Mixing study was ordered and not completed due to normalized PTT of 37    Lab Results   Component Value Date/Time    HGB 14.5 01/25/2024 06:03 AM    HGB 14.2 01/23/2024 03:19 PM    PTTMIXRT  01/25/2024 06:03 AM      Comment:      Mixing study not indicated        Plan:  -Mixing study not completed; no indication due to normalized PTT  -Patient has hematology clearance to proceed with neurosurgical intervention  -Continue to monitor CBC  -Rest of care per primary team     2. Abnormal CT C/A/P findings    CT C/A/P 1/24/2024:  1.  No evidence of a primary malignancy in the chest, abdomen, or pelvis.  2.  Multiple bilateral pulmonary nodules measuring 4 mm or less are indeterminate in the absence of comparison imaging. Given ongoing malignancy work-up, recommend follow-up chest CT in 3 months to exclude metastatic disease.  3.  Multiple hypoattenuating hepatic lesions similarly are indeterminate in the absence of comparison imaging. The largest of these, approximately 1 cm, measure slightly above fluid density. In light of ongoing  malignancy work-up, recommend further   characterization with contrast-enhanced abdominal MRI.  4.  Multiple ill-defined foci of enhancement in the lumbar paraspinal musculature, at least 1 of these measuring 1.6 cm does appear somewhat rounded and masslike. Metastatic disease cannot be excluded. The more superior of these lesions should be   included in the field-of-view on the recommended abdominal MRI which may aid in further characterization. PET/CT may also be considered for further assessment.    Plan:  -Recommend MRI abdomen and MRI lumbar spine w/wo contrast to further characterize CT findings   -Can consider checking tumor markers  -Would recommend outpatient follow-up for repeat CT C/A/P in 3 months to exclude metastatic disease  -Ensure up-to-date with age appropriate cancer screenings such as colonoscopy and mammogram as outpatient  -Rest of vivi per primary team      Outpatient follow up plan: Recommend outpatient follow-up in 6 weeks     Communication with patient/family:  I did update the patient.    Communication with team:  Did communicate with Neurosurgery, Pati Chaves PA-C, regarding hematology clearance for surgery.    I did review this patient with Dr. Thapa and they are in agreement with this plan.      Subjective:    Patient was seen and examined at bedside this morning. She was sitting in her chair comfortably in no acute distress. Patient denies any current symptoms or complaints. Denies headache, dizziness, chest pain, SOB, abdominal pain, N/V/D, confusion. She is feeling well this morning and had no additional questions.     Review of Systems   Constitutional:  Negative for activity change, appetite change, fatigue and fever.   HENT:  Negative for congestion.    Eyes:  Negative for visual disturbance.   Respiratory:  Negative for cough, chest tightness, shortness of breath and wheezing.    Cardiovascular:  Negative for chest pain, palpitations and leg swelling.   Gastrointestinal:   Negative for abdominal distention, abdominal pain, constipation, diarrhea, nausea and vomiting.   Genitourinary:  Negative for dysuria.   Musculoskeletal:  Negative for arthralgias.   Skin:  Negative for rash.   Neurological:  Negative for dizziness, facial asymmetry, weakness, light-headedness and headaches.   Psychiatric/Behavioral:  Negative for confusion.    All other systems reviewed and are negative.      Objective:     Medication Administration - last 24 hours from 01/24/2024 1507 to 01/25/2024 1507         Date/Time Order Dose Route Action Action by     01/25/2024 0901 EST FLUoxetine (PROzac) capsule 20 mg 20 mg Oral Given Nissa Garrison RN     01/25/2024 0613 EST levothyroxine tablet 75 mcg 75 mcg Oral Given Marion Pressley RN     01/25/2024 0901 EST docusate sodium (COLACE) capsule 100 mg 100 mg Oral Given Nissa Garrison RN     01/24/2024 1800 EST docusate sodium (COLACE) capsule 100 mg -- Oral Canceled Entry Sandy Romero RN     01/24/2024 1650 EST docusate sodium (COLACE) capsule 100 mg 100 mg Oral Given Sandy Romero RN     01/25/2024 0901 EST levETIRAcetam (KEPPRA) tablet 750 mg 750 mg Oral Given Nissa Garrison RN     01/24/2024 2109 EST levETIRAcetam (KEPPRA) tablet 750 mg 750 mg Oral Given Daphnie Jean RN     01/25/2024 1302 EST dexamethasone (DECADRON) tablet 4 mg 4 mg Oral Given Nissa Garrison RN     01/25/2024 0613 EST dexamethasone (DECADRON) tablet 4 mg 4 mg Oral Given Marion Pressley RN     01/25/2024 0019 EST dexamethasone (DECADRON) tablet 4 mg 4 mg Oral Given Marion Pressley RN     01/24/2024 1800 EST dexamethasone (DECADRON) tablet 4 mg -- Oral Canceled Entry Sandy Romero RN     01/24/2024 1649 EST dexamethasone (DECADRON) tablet 4 mg 4 mg Oral Given Sandy Romero RN     01/25/2024 1302 EST heparin (porcine) subcutaneous injection 5,000 Units 5,000 Units Subcutaneous Given Nissa Garrison RN     01/25/2024 0613 EST heparin (porcine) subcutaneous  "injection 5,000 Units 5,000 Units Subcutaneous Given Marion Pressley, DEMETRIA     01/25/2024 0019 EST heparin (porcine) subcutaneous injection 5,000 Units 5,000 Units Subcutaneous Given Marion Pressley, DEMETRIA     01/24/2024 1650 EST heparin (porcine) subcutaneous injection 5,000 Units 5,000 Units Subcutaneous Given Sandy Romero RN     01/24/2024 1848 EST iohexol (OMNIPAQUE) 350 MG/ML injection (SINGLE-DOSE) 100 mL 100 mL Intravenous Given Ginette Jolley            /77   Pulse 62   Temp 98.4 °F (36.9 °C)   Resp 16   Ht 5' 2\" (1.575 m)   Wt 62.6 kg (138 lb 0.1 oz)   LMP  (LMP Unknown)   SpO2 97%   BMI 25.24 kg/m²       Physical Exam  Vitals and nursing note reviewed.   Constitutional:       General: She is not in acute distress.     Appearance: Normal appearance. She is not ill-appearing or diaphoretic.   HENT:      Head: Normocephalic and atraumatic.      Nose: No congestion.      Mouth/Throat:      Pharynx: Oropharynx is clear.   Eyes:      General: No scleral icterus.     Conjunctiva/sclera: Conjunctivae normal.   Cardiovascular:      Rate and Rhythm: Normal rate and regular rhythm.      Pulses: Normal pulses.      Heart sounds: Normal heart sounds. No murmur heard.  Pulmonary:      Effort: Pulmonary effort is normal. No respiratory distress.      Breath sounds: Normal breath sounds. No wheezing.   Abdominal:      General: Abdomen is flat. Bowel sounds are normal. There is no distension.      Palpations: Abdomen is soft.      Tenderness: There is no abdominal tenderness.   Musculoskeletal:         General: Normal range of motion.      Cervical back: Normal range of motion.      Right lower leg: No edema.      Left lower leg: No edema.   Skin:     General: Skin is warm.      Capillary Refill: Capillary refill takes less than 2 seconds.      Coloration: Skin is not jaundiced.   Neurological:      General: No focal deficit present.      Mental Status: She is alert and oriented to person, place, and " time. Mental status is at baseline.   Psychiatric:         Mood and Affect: Mood normal.         Behavior: Behavior normal.         Thought Content: Thought content normal.         Judgment: Judgment normal.         Recent Results (from the past 48 hour(s))   CBC and differential    Collection Time: 01/23/24  3:19 PM   Result Value Ref Range    WBC 4.78 4.31 - 10.16 Thousand/uL    RBC 4.62 3.81 - 5.12 Million/uL    Hemoglobin 14.2 11.5 - 15.4 g/dL    Hematocrit 42.3 34.8 - 46.1 %    MCV 92 82 - 98 fL    MCH 30.7 26.8 - 34.3 pg    MCHC 33.6 31.4 - 37.4 g/dL    RDW 12.1 11.6 - 15.1 %    MPV 10.1 8.9 - 12.7 fL    Platelets 152 149 - 390 Thousands/uL    nRBC 0 /100 WBCs    Neutrophils Relative 64 43 - 75 %    Immat GRANS % 0 0 - 2 %    Lymphocytes Relative 26 14 - 44 %    Monocytes Relative 8 4 - 12 %    Eosinophils Relative 1 0 - 6 %    Basophils Relative 1 0 - 1 %    Neutrophils Absolute 3.02 1.85 - 7.62 Thousands/µL    Immature Grans Absolute 0.02 0.00 - 0.20 Thousand/uL    Lymphocytes Absolute 1.26 0.60 - 4.47 Thousands/µL    Monocytes Absolute 0.40 0.17 - 1.22 Thousand/µL    Eosinophils Absolute 0.05 0.00 - 0.61 Thousand/µL    Basophils Absolute 0.03 0.00 - 0.10 Thousands/µL   Magnesium    Collection Time: 01/23/24  3:19 PM   Result Value Ref Range    Magnesium 2.2 1.9 - 2.7 mg/dL   Protime-INR    Collection Time: 01/23/24  3:19 PM   Result Value Ref Range    Protime 13.6 11.6 - 14.5 seconds    INR 1.03 0.84 - 1.19   APTT    Collection Time: 01/23/24  3:19 PM   Result Value Ref Range    PTT 40 (H) 23 - 37 seconds   Comprehensive metabolic panel    Collection Time: 01/23/24  3:19 PM   Result Value Ref Range    Sodium 136 135 - 147 mmol/L    Potassium 3.8 3.5 - 5.3 mmol/L    Chloride 100 96 - 108 mmol/L    CO2 28 21 - 32 mmol/L    ANION GAP 8 mmol/L    BUN 19 5 - 25 mg/dL    Creatinine 0.63 0.60 - 1.30 mg/dL    Glucose 98 65 - 140 mg/dL    Calcium 9.6 8.4 - 10.2 mg/dL    AST 23 13 - 39 U/L    ALT 21 7 - 52 U/L     Alkaline Phosphatase 81 34 - 104 U/L    Total Protein 7.4 6.4 - 8.4 g/dL    Albumin 4.6 3.5 - 5.0 g/dL    Total Bilirubin 0.70 0.20 - 1.00 mg/dL    eGFR 97 ml/min/1.73sq m   Comprehensive metabolic panel    Collection Time: 01/25/24  6:03 AM   Result Value Ref Range    Sodium 140 135 - 147 mmol/L    Potassium 4.0 3.5 - 5.3 mmol/L    Chloride 104 96 - 108 mmol/L    CO2 27 21 - 32 mmol/L    ANION GAP 9 mmol/L    BUN 15 5 - 25 mg/dL    Creatinine 0.53 (L) 0.60 - 1.30 mg/dL    Glucose 129 65 - 140 mg/dL    Calcium 9.5 8.4 - 10.2 mg/dL    AST 19 13 - 39 U/L    ALT 22 7 - 52 U/L    Alkaline Phosphatase 65 34 - 104 U/L    Total Protein 7.0 6.4 - 8.4 g/dL    Albumin 4.4 3.5 - 5.0 g/dL    Total Bilirubin 0.73 0.20 - 1.00 mg/dL    eGFR 102 ml/min/1.73sq m   CBC (With Platelets)    Collection Time: 01/25/24  6:03 AM   Result Value Ref Range    WBC 10.77 (H) 4.31 - 10.16 Thousand/uL    RBC 4.79 3.81 - 5.12 Million/uL    Hemoglobin 14.5 11.5 - 15.4 g/dL    Hematocrit 44.1 34.8 - 46.1 %    MCV 92 82 - 98 fL    MCH 30.3 26.8 - 34.3 pg    MCHC 32.9 31.4 - 37.4 g/dL    RDW 12.5 11.6 - 15.1 %    Platelets 174 149 - 390 Thousands/uL    MPV 10.2 8.9 - 12.7 fL   Equal mix, APTT    Collection Time: 01/25/24  6:03 AM   Result Value Ref Range    PTT Mixing Study Room Temp      PTT Mixing Study Incubated      PTT 37 23 - 37 seconds       CT chest abdomen pelvis w contrast    Result Date: 1/25/2024  Narrative: CT CHEST, ABDOMEN AND PELVIS WITH IV CONTRAST INDICATION: Brain/CNS neoplasm, staging rule out malignancy in CAP,  found to have brain tumor.. COMPARISON: None. TECHNIQUE: CT examination of the chest, abdomen and pelvis was performed. Multiplanar 2D reformatted images were created from the source data. This examination, like all CT scans performed in the [facility], was performed utilizing techniques to minimize radiation dose exposure, including the use of iterative reconstruction and automated exposure control.  Radiation dose length product (DLP) for this visit: 554.42 mGy-cm IV Contrast: 100 mL of iohexol (OMNIPAQUE) Enteric Contrast: Not administered. FINDINGS: CHEST LUNGS: Several small pulmonary nodules bilaterally. Largest nodules include a 4 mm left lower lobe nodule (series 8 image 142) and two 3 mm right upper lobe nodules (images 96 and 99). No tracheal or endobronchial lesion. PLEURA: Unremarkable. HEART/GREAT VESSELS: Trace pericardial effusion. No thoracic aortic aneurysm. MEDIASTINUM AND HONORIO: Unremarkable. CHEST WALL AND LOWER NECK: Unremarkable. ABDOMEN LIVER/BILIARY TREE: There are multiple small hypoattenuating hepatic lesions. The largest of these which measure 1 cm in segment 5/6 (series 7 image 105) and 1.1 cm in segment 2 (image 97) do measure slightly above fluid density. GALLBLADDER: No calcified gallstones. A 4 mm hyperattenuating focus along the nondependent portion of the gallbladder (series 7 image 141) may reflect a small polyp. No pericholecystic inflammatory change. SPLEEN: Unremarkable. PANCREAS: Unremarkable. ADRENAL GLANDS: Unremarkable. KIDNEYS/URETERS: No hydronephrosis or urinary tract calculi. Subcentimeter hypoattenuating renal lesion(s), too small to characterize but statistically likely benign, which do not warrant follow-up (Radiology June 2019). STOMACH AND BOWEL: Unremarkable. APPENDIX: No findings to suggest appendicitis. ABDOMINOPELVIC CAVITY: Trace free fluid in the pelvis. No pneumoperitoneum. No lymphadenopathy. VESSELS: Unremarkable for patient's age. PELVIS REPRODUCTIVE ORGANS: Post hysterectomy. URINARY BLADDER: Unremarkable. ABDOMINAL WALL/INGUINAL REGIONS: There are a few multiple ill-defined foci of enhancement within the lumbar paraspinal musculature as demonstrated on series 7 images 129 bilaterally, on the right on image 132, with an approximately 1.6 cm lesion on the right on image 153 appearing more rounded and masslike. BONES: No acute fracture or suspicious  osseous lesion.     Impression: 1.  No evidence of a primary malignancy in the chest, abdomen, or pelvis. 2.  Multiple bilateral pulmonary nodules measuring 4 mm or less are indeterminate in the absence of comparison imaging. Given ongoing malignancy work-up, recommend follow-up chest CT in 3 months to exclude metastatic disease. 3.  Multiple hypoattenuating hepatic lesions similarly are indeterminate in the absence of comparison imaging. The largest of these, approximately 1 cm, measure slightly above fluid density. In light of ongoing malignancy work-up, recommend further characterization with contrast-enhanced abdominal MRI. 4.  Multiple ill-defined foci of enhancement in the lumbar paraspinal musculature, at least 1 of these measuring 1.6 cm does appear somewhat rounded and masslike. Metastatic disease cannot be excluded. The more superior of these lesions should be included in the field-of-view on the recommended abdominal MRI which may aid in further characterization. PET/CT may also be considered for further assessment. The study was marked in EPIC for immediate notification. Workstation performed: [id number]     CTA head w wo contrast    Result Date: 1/25/2024  Narrative: CTA BRAIN WITH CONTRAST INDICATION: Brain/CNS neoplasm, staging Brain mass, evaluate brain vasculature, pre op planning COMPARISON:    1/24/2024 TECHNIQUE:   Post contrast imaging was performed after administration of iodinated contrast through the neck and brain. Post contrast axial 0.625 mm images timed to opacify the arterial system. 3D rendering was performed on an independent workstation.   MIP reconstructions performed. Coronal reconstructions were performed of the noncontrast portion of the brain. Radiation dose length product (DLP) for this visit:  1661.75 mGy-cm .  This examination, like all CT scans [facility], was performed utilizing techniques to minimize radiation dose exposure, including the  use of iterative reconstruction and automated exposure control. IV Contrast:  100 mL of iohexol (OMNIPAQUE) IMAGE QUALITY:   Diagnostic FINDINGS: NONCONTRAST BRAIN: PARENCHYMA: Again demonstrated is a large hyperdense dural based extra-axial mass occupying a majority of the left middle cranial fossa anteriorly, measuring at least 3.6 cm in maximal transverse dimension on series 2 image 14. Marked associated surrounding vasogenic edema extends into the left frontal lobe with 0.7 cm of rightward subfalcine herniation redemonstrated. This subfalcine herniation is measured on series 2 image 23. VENTRICLES AND EXTRA-AXIAL SPACES: Effacement of the left lateral ventricle and some of the sulci in the left frontal and temporal lobes noted surrounded by the large left middle cranial fossa isodense to hyperdense mass. VISUALIZED ORBITS: Normal visualized orbits. PARANASAL SINUSES: Normal visualized paranasal sinuses. INTRACRANIAL VASCULATURE INTERNAL CAROTID ARTERIES:  Normal enhancement of the intracranial portions of the internal carotid arteries.  Normal ophthalmic artery origins.  Normal ICA terminus. Enhancing dural based extra-axial mass in the left middle cranial fossa demonstrates numerous internal vessels. The left middle cerebral artery bifurcation and distal M1 segment are displaced superiorly by the mass as well as branches of the left MCA. Probable arterial feeders arise from the anterior lateral margin of the tumor, likely associated from middle meningeal artery branches. Additionally along the anterior superior margin of the vessel there is a prominent serpiginous draining vein, series 4 image 140 which extends into a cortical vein that runs lateral to the left frontal lobe, ultimately extending into the superior sagittal sinus. There is slight hypertrophy of the left middle meningeal artery compared to the right, series 5 image 219, likely related to arterial supply. ANTERIOR CIRCULATION:  Symmetric A1  segments and anterior cerebral arteries with normal enhancement.  Normal anterior communicating artery. MIDDLE CEREBRAL ARTERY CIRCULATION: Mass effect on the distal M1 segment of the left middle cerebral artery/MCA branches and MCA bifurcation by the mass in the left middle cranial fossa noted, exemplified on series 500, image 1. Right middle cerebral artery normal. DISTAL VERTEBRAL ARTERIES:  Normal distal vertebral arteries.  Posterior inferior cerebellar artery origins are normal. Normal vertebral basilar junction. BASILAR ARTERY:  Basilar artery is normal in caliber.  Normal superior cerebellar arteries. POSTERIOR CEREBRAL ARTERIES: Both posterior cerebral arteries arises from the basilar tip.  Both arteries demonstrate normal enhancement.   Normal posterior communicating arteries. DURAL VENOUS SINUSES:  Normal. NON VASCULAR ANATOMY BONY STRUCTURES:  No acute osseous abnormality.     Impression: 1. Large, 3.6 cm dural based extra-axial mass with associated enhancement in the left middle cranial fossa correlates to the MRI performed the day earlier. There is surrounding vasogenic edema and 0.7 cm of associated right-sided subfalcine herniation. Imaging features are most compatible with meningioma. 2. Exuberant arterial supply to the left middle cranial fossa meningioma likely from the left middle meningeal artery which is slightly hypertrophied on the left compared to the right. A prominent draining cortical vein empties some of the enhancement of  the tumor along the superior margin draining into a cortical vein running over the anterior lateral aspects of the left frontal lobe. Significant associated mass effect in the left MCA bifurcation region and left MCA candelabra which are elevated along the posterior superior margin of this mass. Workstation performed: [id number]     MRI brain NeuroQuant wo and w contrast    Result Date: 1/23/2024  Narrative: MRI BRAIN WITH AND WITHOUT CONTRAST, NeuroQuant IMAGING  INDICATION: R41.89: Other symptoms and signs involving cognitive functions and awareness. moca 21/30, word finding/short term recall concerns; moca 21/30, word finding/short term recall concerns COMPARISON:   None. TECHNIQUE:  Multiplanar, multisequence imaging of the brain was performed before and after gadolinium administration. Additional sequences from brain tumor protocol were added to this NeuroQuant study. Sagittal 3D volumetric imaging processed by NEUROQUANT software creating General Morphology and Age Related Atrophy reports. IV CONTRAST:  10 mL of Gadobutrol injection (SINGLE-DOSE) IMAGE QUALITY:  Diagnostic. FINDINGS: BRAIN PARENCHYMA: 3.9 x 4.0 x 4.3 cm dural based extra-axial enhancing lesion in anterior aspect of left middle cranial fossa with associated hyperperfusion and small dural feeder vessels (14:65, 12:7). Diffuse surrounding vasogenic edema involving left frontal, left subinsular, left posterior limb of internal capsule, left parietal, and left temporal lobes. Compressive mass effect on adjacent left temporal lobe and left lateral ventricle, 0.7 cm rightward midline shift (6:13), left uncal herniation, and rightward shift of upper brainstem. Brainstem and cerebellum demonstrate normal signal. There is no intracranial hemorrhage.  There is no evidence of acute infarction and diffusion imaging is unremarkable. Small scattered hyperintensities on T2/FLAIR imaging are noted in the periventricular and subcortical white matter demonstrating an appearance that is statistically most likely to represent mild microangiopathic change. QUANTITATIVE: Exam Quality: Adequate for volumetric analysis. 3D Volumetric Data: Hippocampal Occupancy Score (HOC):                   0.84 Percentile for similar age:                                   52 Lower HOC scores are associated with progression of MCI to AD. Total hippocampal volume (cc):                           7.77 Percentile for similar age:                               97 Entorhinal cortex (cc)                                            6.86 Percentile for similar age:                                   99 Superior Lateral ventricular volume (cc):             21.86 Percentile for similar age:                             50 Inferior lateral ventricular volume (cc):                    1.49 Percentile for similar age:                                67 Quantitative conclusions: Hippocampal Volume:                       Normal volume Entorhinal Volume:                            Normal volume Superior Lateral Ventricle Volume:     Normal Volume Inferior Lateral Ventricle Volume:       Normal Volume Concordance between qualitative and quantitative hippocampal volume assessment: Concordant. Change in brain volumes: No previous volumetric study for comparison Mean hippocampal volume loss among normal elderly: 0.7% per year, (-0.3 to 1.7;  Adelita 2008; also Nishant 2010). VENTRICLES:  Compressive mass effect on left lateral ventricle. No obstructive hydrocephalus. No acute intraventricular hemorrhage. SELLA AND PITUITARY GLAND:  Normal. ORBITS:  Normal. PARANASAL SINUSES:  Normal. VASCULATURE:  Evaluation of the major intracranial vasculature demonstrates appropriate flow voids. CALVARIUM AND SKULL BASE:  Normal. EXTRACRANIAL SOFT TISSUES:  Normal.     Impression: 4.3 cm probable meningioma in anterior aspect of left middle cranial fossa with small dural feeder vessels, diffuse surrounding vasogenic edema (left frontal, left subinsular, left posterior limb of internal capsule, left parietal, and left temporal lobes), compressive mass effect on adjacent left temporal lobe and left lateral ventricle, 0.7 cm rightward midline shift, left uncal herniation, and rightward shift of upper brainstem. Recommend neurosurgical consultation for further evaluation. NeuroQuant analysis was performed:   Normal study however this is confounded by a left middle cranial fossa mass and the results  of this study are likely inconclusive but overall does not support neurodegeneration. I personally instructed the MR technologist to send this patient to the emergency room for further evaluation at approximately 2:21 PM and to complete remainder of study. I personally discussed this study with RUDOLPH MCKEON on 1/23/2024 3:11 PM. Workstation performed: [id number]     Mammo screening bilateral w 3d & cad    Result Date: 1/22/2024  Narrative: DIAGNOSIS: Encounter for screening mammogram for breast cancer TECHNIQUE: Digital screening mammography was performed. Computer Aided Detection (CAD) analyzed all applicable images. COMPARISONS: Prior breast imaging dated: 01/27/2023, 01/13/2023, 01/03/2022, 12/30/2020, 12/04/2019, 09/04/2018, 05/30/2017, and 04/21/2015 RELEVANT HISTORY: Family Breast Cancer History: History of breast cancer in Mother, Maternal Aunt, Cousin. Family Medical History: Family medical history includes breast cancer in 3 relatives (cousin, maternal aunt, mother) and colon cancer in paternal uncle. Personal History: Hormone history includes other. Surgical history includes hysterectomy. Medical history includes endometrial cancer and BRCA 1 negative. The patient is scheduled in a reminder system for screening mammography. 8-10% of cancers will be missed on mammography. Management of a palpable abnormality must be based on clinical grounds.  Patients will be notified of their results via letter from our facility. Accredited by [employer] and FDA. RISK ASSESSMENT: 5 Year Tyrer-Cuzick: 2.23% 10 Year Tyrer-Cuzick: 4.49% Lifetime Tyrer-Cuzick: 10.74% TISSUE DENSITY: The breasts are heterogeneously dense, which may obscure small masses. INDICATION: Mary Ashby is a 63 y.o. female presenting for screening mammography. FINDINGS: Bilateral There are no suspicious masses, grouped microcalcifications or areas of unexplained architectural distortion. The skin and nipple areolar complex are  unremarkable.  There are scattered benign-appearing calcifications bilaterally.     Impression: No mammographic evidence of malignancy. ASSESSMENT/BI-RADS CATEGORY: Left: 2 - Benign Right: 2 - Benign Overall: 2 - Benign RECOMMENDATION:      - Routine screening mammogram in 1 year for both breasts. Workstation ID: [id number]       I have personally reviewed labs, imaging studies, and pertinent reports.      This note has been generated by voice recognition software system.  Therefore, there may be spelling, grammar, and or syntax errors. Please contact if questions arise.        Marie Valdez MD, MPH  [facility]  Internal Medicine Residency, PGY-II     *Recommendations not final until attending attestation completed*

## 2024-01-25 NOTE — CASE MANAGEMENT
Case Management Discharge Planning Note    Patient name Mary Ashby  Location [facility] 718/[facility] 718-01 MRN [id number]  : 1960 Date 2024       Current Admission Date: 2024  Current Admission Diagnosis:Meningioma (HCC)   Patient Active Problem List    Diagnosis Date Noted    Lung nodules 2024    Hepatic lesion 2024    Abnormal CT scan, lumbar spine 2024    Blood transfusion declined because patient is Caodaism 2024    Leukopenia 2024    Meningioma (HCC) 2024    Anxiety 2023    Encounter for gynecological examination (general) (routine) without abnormal findings 2022    Lipid screening 2018    Need for hepatitis C screening test 2018    Hypothyroidism 2016      LOS (days): 1  Geometric Mean LOS (GMLOS) (days): 4  Days to GMLOS:3     OBJECTIVE:  Risk of Unplanned Readmission Score: 6.76         Current admission status: Inpatient   Preferred Pharmacy:   [facility] [place] [facility] [address]  [address]  [address]  [facility] [address]  Phone: [phone] Fax: [phone]    Primary Care Provider: Sinan Palacios PA-C    Primary Insurance: [facility] BLUE SHIELD  Secondary Insurance:     DISCHARGE DETAILS:          Additional Comments: Phone call to [facility], test strips ([id number]) and lancets ([id number])will need prior auth due to 10x/day testing. Alcohol pads are OTC and control solution for 2 bottles is 10.55 OOP

## 2024-01-25 NOTE — OCCUPATIONAL THERAPY NOTE
Occupational Therapy Evaluation     Patient Name: Mary Ashby  Today's Date: 1/25/2024  Problem List  Principal Problem:    Meningioma (HCC)  Active Problems:    Hypothyroidism    Anxiety    Blood transfusion declined because patient is Jain    Leukopenia    Lung nodules    Hepatic lesion    Abnormal CT scan, lumbar spine    Past Medical History  Past Medical History:   Diagnosis Date    Arthritis     BRCA1 negative     Disease of thyroid gland     Endometrial cancer (HCC) 1991    Thyroid disease     Uterus cancer (HCC)      Past Surgical History  Past Surgical History:   Procedure Laterality Date    HYSTERECTOMY      still has ovaries      01/25/24 0815   OT Last Visit   OT Visit Date 01/25/24   Note Type   Note type Evaluation   Pain Assessment   Pain Assessment Tool 0-10   Pain Score No Pain   Restrictions/Precautions   Weight Bearing Precautions Per Order No   Other Precautions Telemetry   Home Living   Type of Home House   Home Layout One level;Stairs to enter with rails  (+2STE)   Bathroom Shower/Tub Walk-in shower   Bathroom Toilet Standard   Bathroom Equipment Shower chair;Commode   Bathroom Accessibility Accessible   Home Equipment Walker;Cane   Additional Comments pt reports discharge plan to stay with daughter above is daughters' home set-up   Prior Function   Level of [place] Independent with ADLs;Independent with IADLS   Lives With Spouse;Daughter   Receives Help From Family   IADLs Independent with driving;Independent with meal prep;Independent with medication management   Falls in the last 6 months 0   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)   Lifestyle   Autonomy I with ADL's/iaDL's, no AD with functional mobiltiy +drives   Reciprocal Relationships son, daughter, SNL   Service to Others 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 (+Parkinson's disease) parents (+medical issues)   Intrinsic Gratification keeping active   General   Family/Caregiver Present No   ADL   Eating  Assistance 7  Independent   Grooming Assistance 7  Independent   UB Bathing Assistance 6  Modified Independent   LB Bathing Assistance 6  Modified Independent   UB Dressing Assistance 6  Modified independent   LB Dressing Assistance 6  Modified independent   Toileting Assistance  6  Modified independent   Bed Mobility   Supine to Sit 6  Modified independent   Transfers   Sit to Stand 6  Modified independent   Stand to Sit 6  Modified independent   Additional Comments (-) AD   Functional Mobility   Functional Mobility 6  Modified independent   Additional Comments mod I without AD household distance functional mobility, no overt LOB, good safety awareness   Balance   Static Sitting Normal   Dynamic Sitting Good   Static Standing Fair +   Dynamic Standing Fair +   Ambulatory Fair   Activity Tolerance   Activity Tolerance Patient tolerated treatment well   Medical Staff Made Aware PT Connie due to the patient's co-morbidities, clinically unstable presentation, and present impairments which are a regression from the patient's baseline.    Nurse Made Aware Rn cleared pt for therapy   RUE Assessment   RUE Assessment WFL   LUE Assessment   LUE Assessment WFL   Hand Function   Gross Motor Coordination Functional   Fine Motor Coordination Functional   Cognition   Overall Cognitive Status WFL   Arousal/Participation Alert;Responsive;Cooperative   Attention Within functional limits   Orientation Level Oriented X4   Memory Within functional limits   Following Commands Follows all commands and directions without difficulty   Comments pt cooperative with therapy, no cogntiive concerns with evluation.   Assessment   Assessment Pt is a 63 y.o. female who was admitted to [facility] on 1/24/2024 with word finding difficulty intermittently for 5 years > last month, +left frontal Meningioma (HCC) .surgical planning, will need cerebral angiogram tentatively Monday with surgical tumor resection Tuesday 1/30.  Patient  has a past  medical history of Arthritis, BRCA1 negative, Disease of thyroid gland, Endometrial cancer (HCC) (1991), Thyroid disease, and Uterus cancer (HCC).  At baseline pt was completing . Pt lives with spouse in a 2SH, will discharge home with daughter who has a ranch home with 2STE . Currently pt requires mod I for overall ADLS and mod I   for functional mobility/transfers. Pt currently presents with impairments in the following categories -difficulty performing IADLS  . These impairments, as well as pt's risk for falls  limit pt's ability to safely engage in all baseline areas of occupation, includingcommunity mobility and driving From OT standpoint, recommend home with increased family support no OT needs at this time upon D/C. No further acute OT needs indicated at this time - Anticipate d/c home with family support when medically cleared - d/c from caseload   Goals   Patient Goals to get back home   Discharge Recommendation   Rehab Resource Intensity Level, OT No post-acute rehabilitation needs   Equipment Recommended (NO DME needs at this time)   AM-PAC Daily Activity Inpatient   Lower Body Dressing 4   Bathing 4   Toileting 4   Upper Body Dressing 4   Grooming 4   Eating 4   Daily Activity Raw Score 24   Daily Activity Standardized Score (Calc for Raw Score >=11) 57.54   AM-PAC Applied Cognition Inpatient   Following a Speech/Presentation 4   Understanding Ordinary Conversation 4   Taking Medications 4   Remembering Where Things Are Placed or Put Away 4   Remembering List of 4-5 Errands 4   Taking Care of Complicated Tasks 4   Applied Cognition Raw Score 24   Applied Cognition Standardized Score 62.21   End of Consult   Patient Position at End of Consult Bed/Chair alarm activated;All needs within reach   Nurse Communication Nurse aware of consult      Sanjuana Pena MOT, OTR/L

## 2024-01-25 NOTE — ASSESSMENT & PLAN NOTE
Patient provided with blood consent/refusal form  Hematology consulted, no need to further optimize Hb per their recs  Hb currently 14.5

## 2024-01-25 NOTE — PLAN OF CARE
Problem: PAIN - ADULT  Goal: Verbalizes/displays adequate comfort level or baseline comfort level  Description: Interventions:  - Encourage patient to monitor pain and request assistance  - Assess pain using appropriate pain scale  - Administer analgesics based on type and severity of pain and evaluate response  - Implement non-pharmacological measures as appropriate and evaluate response  - Consider cultural and social influences on pain and pain management  - Notify physician/advanced practitioner if interventions unsuccessful or patient reports new pain  Outcome: Progressing     Problem: SAFETY ADULT  Goal: Patient will remain free of falls  Description: INTERVENTIONS:  - Educate patient/family on patient safety including physical limitations  - Instruct patient to call for assistance with activity   - Consult OT/PT to assist with strengthening/mobility   - Keep Call bell within reach  - Keep bed low and locked with side rails adjusted as appropriate  - Keep care items and personal belongings within reach  - Initiate and maintain comfort rounds  - Make Fall Risk Sign visible to staff  - Offer Toileting every 2 Hours, in advance of need  - Apply yellow socks and bracelet for high fall risk patients  - Consider moving patient to room near nurses station  Outcome: Progressing  Goal: Maintain or return to baseline ADL function  Description: INTERVENTIONS:  -  Assess patient's ability to carry out ADLs; assess patient's baseline for ADL function and identify physical deficits which impact ability to perform ADLs (bathing, care of mouth/teeth, toileting, grooming, dressing, etc.)  - Assess/evaluate cause of self-care deficits   - Assess range of motion  - Assess patient's mobility; develop plan if impaired  - Assess patient's need for assistive devices and provide as appropriate  - Encourage maximum independence but intervene and supervise when necessary  - Involve family in performance of ADLs  - Assess for home  care needs following discharge   - Consider OT consult to assist with ADL evaluation and planning for discharge  - Provide patient education as appropriate  Outcome: Progressing  Goal: Maintains/Returns to pre admission functional level  Description: INTERVENTIONS:  - Perform AM-PAC 6 Click Basic Mobility/ Daily Activity assessment daily.  - Set and communicate daily mobility goal to care team and patient/family/caregiver.   - Collaborate with rehabilitation services on mobility goals if consulted  - Perform Range of Motion 2 times a day.  - Reposition patient every 2 hours.  - Dangle patient 2 times a day  - Stand patient 2 times a day  - Ambulate patient 2 times a day  - Out of bed to chair 2 times a day   - Out of bed for meals 2 times a day  - Out of bed for toileting  - Record patient progress and toleration of activity level   Outcome: Progressing     Problem: DISCHARGE PLANNING  Goal: Discharge to home or other facility with appropriate resources  Description: INTERVENTIONS:  - Identify barriers to discharge w/patient and caregiver  - Arrange for needed discharge resources and transportation as appropriate  - Identify discharge learning needs (meds, wound care, etc.)  - Arrange for interpretive services to assist at discharge as needed  - Refer to Case Management Department for coordinating discharge planning if the patient needs post-hospital services based on physician/advanced practitioner order or complex needs related to functional status, cognitive ability, or social support system  Outcome: Progressing     Problem: Knowledge Deficit  Goal: Patient/family/caregiver demonstrates understanding of disease process, treatment plan, medications, and discharge instructions  Description: Complete learning assessment and assess knowledge base.  Interventions:  - Provide teaching at level of understanding  - Provide teaching via preferred learning methods  Outcome: Progressing     Problem: NEUROSENSORY -  ADULT  Goal: Achieves stable or improved neurological status  Description: INTERVENTIONS  - Monitor and report changes in neurological status  - Monitor vital signs such as temperature, blood pressure, glucose, and any other labs ordered   - Initiate measures to prevent increased intracranial pressure  - Monitor for seizure activity and implement precautions if appropriate      Outcome: Progressing  Goal: Remains free of injury related to seizures activity  Description: INTERVENTIONS  - Maintain airway, patient safety  and administer oxygen as ordered  - Monitor patient for seizure activity, document and report duration and description of seizure to physician/advanced practitioner  - If seizure occurs,  ensure patient safety during seizure  - Reorient patient post seizure  - Seizure pads on all 4 side rails  - Instruct patient/family to notify RN of any seizure activity including if an aura is experienced  - Instruct patient/family to call for assistance with activity based on nursing assessment  - Administer anti-seizure medications if ordered    Outcome: Progressing  Goal: Achieves maximal functionality and self care  Description: INTERVENTIONS  - Monitor swallowing and airway patency with patient fatigue and changes in neurological status  - Encourage and assist patient to increase activity and self care.   - Encourage visually impaired, hearing impaired and aphasic patients to use assistive/communication devices  Outcome: Progressing

## 2024-01-25 NOTE — PROGRESS NOTES
[facility]  Progress Note  Name: Mary Ashby I  MRN: [id number]  Unit/Bed#: [facility] 718-01 I Date of Admission: 1/24/2024   Date of Service: 1/25/2024 I Hospital Day: 1    Assessment/Plan   * Meningioma (HCC)  Assessment & Plan  4.3 cm meningioma w/ vasogenic edema and midline shift present on outpatient MRI  Evaluated by neurosurgery  Patient was started on Decadron IV to help with edema  Started on Keppra for seizure prophylaxis  Aspirin on hold  Plan for cerebral angiogram and surgical tumor resection early next week  Neurochecks    Abnormal CT scan, lumbar spine  Assessment & Plan  Multiple ill-defined foci of enhancement in the lumbar paraspinal musculature, at least 1 of these measuring 1.6 cm does appear somewhat rounded and masslike. Metastatic disease cannot be excluded. The more superior of these lesions should be   included in the field-of-view on the recommended abdominal MRI which may aid in further characterization. PET/CT may also be considered for further assessment.    Patient with history of uterus cancer status post hysterectomy  Hematology oncology is following    Outpatient further workup    Hepatic lesion  Assessment & Plan  Multiple hypoattenuating hepatic lesions   In light of ongoing malignancy work-up, recommend further characterization with contrast-enhanced abdominal MRI.    Outpatient further workup    Lung nodules  Assessment & Plan  CT scan with multiple bilateral pulmonary nodules measuring 4 mm   Recommendation for follow-up chest CT in 3 months to exclude metastatic disease.    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    Mobility:   Basic Mobility Inpatient Raw Score: 24  JH-HLM Goal: 8: Walk 250 feet or more  JH-HLM Achieved: 8: Walk 250 feet ot more  HLM Goal achieved. Continue to encourage appropriate mobility.    Patient Centered Rounds: I performed bedside rounds with nursing staff today.   Discussions with Specialists or Other Care Team Provider: AWA    Education and Discussions with Family / Patient:  Patient.     Total Time Spent on Date of Encounter in care of patient: 35 mins. This time was spent on one or more of the following: performing physical exam; counseling and coordination of care; obtaining or reviewing history; documenting in the medical record; reviewing/ordering tests, medications or procedures; communicating with other healthcare professionals and discussing with patient's family/caregivers.    Current Length of Stay: 1 day(s)  Current Patient Status: Inpatient   Certification Statement: The patient will continue to require additional inpatient hospital stay due to management of meningioma      Code Status: Level 1 - Full Code    Subjective:   Patient seen and examined  Comfortable sitting in chair  No headache  No chest pain or shortness of breath    Objective:     Vitals:   Temp (24hrs), Av.7 °F (37.1 °C), Min:98.4 °F (36.9 °C), Max:99.2 °F (37.3 °C)    Temp:  [98.4 °F (36.9 °C)-99.2 °F (37.3 °C)] 98.4 °F (36.9 °C)  HR:  [62-71] 62  Resp:  [16-18] 16  BP: (128-167)/(65-78) 130/77  SpO2:  [94 %-97 %] 97 %  Body mass index is 25.24 kg/m².     Input and Output Summary (last 24 hours):     Intake/Output Summary (Last 24 hours) at 2024 0957  Last data filed at 2024 0822  Gross per 24 hour   Intake 480 ml   Output --   Net 480 ml       Physical Exam:   Physical Exam   Patient is awake alert oriented no acute  distress  Comfortable sitting in chair  Lungs clear to auscultation bilateral  Heart positive S1-S2 no murmur  Abdomen soft nontender  Lower extremities no edema    Additional Data:     Labs:  Results from last 7 days   Lab Units 01/25/24  0603 01/23/24  1519   WBC Thousand/uL 10.77* 4.78   HEMOGLOBIN g/dL 14.5 14.2   HEMATOCRIT % 44.1 42.3   PLATELETS Thousands/uL 174 152   NEUTROS PCT %  --  64   LYMPHS PCT %  --  26   MONOS PCT %  --  8   EOS PCT %  --  1     Results from last 7 days   Lab Units 01/25/24  0603   SODIUM mmol/L 140   POTASSIUM mmol/L 4.0   CHLORIDE mmol/L 104   CO2 mmol/L 27   BUN mg/dL 15   CREATININE mg/dL 0.53*   ANION GAP mmol/L 9   CALCIUM mg/dL 9.5   ALBUMIN g/dL 4.4   TOTAL BILIRUBIN mg/dL 0.73   ALK PHOS U/L 65   ALT U/L 22   AST U/L 19   GLUCOSE RANDOM mg/dL 129     Results from last 7 days   Lab Units 01/23/24  1519   INR  1.03                   Lines/Drains:  Invasive Devices       Peripheral Intravenous Line  Duration             Peripheral IV 01/24/24 Left Antecubital <1 day                          Imaging: Chest abdomen pelvis CT scan result reviewed    Recent Cultures (last 7 days):         Last 24 Hours Medication List:   Current Facility-Administered Medications   Medication Dose Route Frequency Provider Last Rate    acetaminophen  650 mg Oral Q6H PRN Nighat Mejias MD      dexamethasone  4 mg Oral Q6H [facility] Loreto Kang PA-C      docusate sodium  100 mg Oral BID Nighat Mejias MD      FLUoxetine  20 mg Oral Daily Nighat Mejias MD      heparin (porcine)  5,000 Units Subcutaneous Q8H [facility] Nighat Mejias MD      levETIRAcetam  750 mg Oral Q12H [facility] Loreto Kang PA-C      levothyroxine  75 mcg Oral Early Morning Nighat Mejias MD          Today, Patient Was Seen By: Joseph Borges DO    **Please Note: This note may have been constructed using a voice recognition system.**

## 2024-01-25 NOTE — ASSESSMENT & PLAN NOTE
Patient with left frontal meningioma, presented with word finding difficulties intermittently for 5 years, increasing  for >1 month    Imaging reviewed personally and by attending. Final results as below  MRI 1/23/24: 4.3 cm probable meningioma in anterior aspect of left middle cranial fossa with surrounding edema    Plan  Continue regular neurologic checks   Decadron 4mg q6  Keppra 750 bid for seizure ppx  Ongoing medical management per primary team   Pain control per primary  Eval and mobilize per PT/OT  DVT PPX: SCDs, ok for dvt ppx    Surgical planning, will need cerebral angiogram tentatively Monday with surgical tumor resection Tuesday  Neurosurgery will continue to follow. Please call with any questions or concerns.

## 2024-01-25 NOTE — ASSESSMENT & PLAN NOTE
CT scan with multiple bilateral pulmonary nodules measuring 4 mm   Recommendation for follow-up chest CT in 3 months to exclude metastatic disease.

## 2024-01-25 NOTE — ASSESSMENT & PLAN NOTE
4.3 cm meningioma w/ vasogenic edema and midline shift present on outpatient MRI  Evaluated by neurosurgery  Patient was started on Decadron IV to help with edema  Started on Keppra for seizure prophylaxis  Aspirin on hold  Plan for cerebral angiogram and surgical tumor resection early next week  Neurochecks

## 2024-01-25 NOTE — PROGRESS NOTES
[facility]  Progress Note  Name: Mary Ashby I  MRN: [id number]  Unit/Bed#: [facility] 718-01 I Date of Admission: 1/24/2024   Date of Service: 1/25/2024 I Hospital Day: 1    Assessment/Plan   * Meningioma (HCC)  Assessment & Plan  Patient with left frontal meningioma, presented with word finding difficulties intermittently for 5 years, increasing  for >1 month    Imaging reviewed personally and by attending. Final results as below  MRI 1/23/24: 4.3 cm probable meningioma in anterior aspect of left middle cranial fossa with surrounding edema    Plan  Continue regular neurologic checks   Decadron 4mg q6  Keppra 750 bid for seizure ppx  Ongoing medical management per primary team   Pain control per primary  Eval and mobilize per PT/OT  DVT PPX: SCDs, ok for dvt ppx    Surgical planning, will need cerebral angiogram tentatively Monday with surgical tumor resection Tuesday  Neurosurgery will continue to follow. Please call with any questions or concerns.      Blood transfusion declined because patient is Jainism  Assessment & Plan  Patient provided with blood consent/refusal form  Hematology consulted, no need to further optimize Hb per their recs  Hb currently 14.5           Subjective/Objective     Chief Complaint: None    Subjective: Patient seen and examined at bedside.  Patient reports feeling well.  Has had no difficulties with word finding since coming into the hospital and being started on antiseizure medications and steroids.  Denies new weakness, numbness, or tingling.      Objective: Well-appearing woman in no acute distress.  Ambulating without difficulty and cleaning her room.    I/O         01/23 0701 01/24 0700 01/24 0701 01/25 0700 01/25 0701  01/26 0700    P.O.   480    Total Intake(mL/kg)   480 (7.7)    Net   +480                   Invasive Devices       Peripheral Intravenous Line  Duration             Peripheral IV 01/24/24 Left Antecubital <1 day       "              Physical Exam:  Vitals: Blood pressure 130/77, pulse 62, temperature 98.4 °F (36.9 °C), resp. rate 16, height 5' 2\" (1.575 m), weight 62.6 kg (138 lb 0.1 oz), SpO2 97%, not currently breastfeeding.,Body mass index is 25.24 kg/m².      General appearance:  Appears stated age  Head: Normocephalic, without obvious abnormality, atraumatic  Eyes: EOMI, PERRL  Neck: supple, symmetrical, trachea midline   Lungs: non labored breathing  Heart: regular heart rate  Neurologic:   Mental status: Alert, oriented x3, thought content appropriate  Cranial nerves: grossly intact (Cranial nerves II-XII)  Sensory: normal to light tough  Motor: moving all extremities without focal weakness, strength 5/5 throughout  Reflexes: 2+ and symmetric  Gait: ambulating without difficulty. No gait abnormalities noted      Lab Results:  Results from last 7 days   Lab Units 01/25/24  0603 01/23/24  1519   WBC Thousand/uL 10.77* 4.78   HEMOGLOBIN g/dL 14.5 14.2   HEMATOCRIT % 44.1 42.3   PLATELETS Thousands/uL 174 152   NEUTROS PCT %  --  64   MONOS PCT %  --  8   EOS PCT %  --  1     Results from last 7 days   Lab Units 01/25/24  0603 01/23/24  1519   POTASSIUM mmol/L 4.0 3.8   CHLORIDE mmol/L 104 100   CO2 mmol/L 27 28   BUN mg/dL 15 19   CREATININE mg/dL 0.53* 0.63   CALCIUM mg/dL 9.5 9.6   ALK PHOS U/L 65 81   ALT U/L 22 21   AST U/L 19 23     Results from last 7 days   Lab Units 01/23/24  1519   MAGNESIUM mg/dL 2.2         Results from last 7 days   Lab Units 01/25/24  0603 01/23/24  1519   INR   --  1.03   PTT seconds 37 40*     No results found for: \"TROPONINT\"  ABG:No results found for: \"PHART\", \"MFJ4IFJ\", \"PO2ART\", \"KOZ1SOR\", \"A2FEUWIV\", \"BEART\", \"SOURCE\"    Imaging Studies: I have personally reviewed pertinent reports and I have personally reviewed pertinent films in PACS    EKG, Pathology, and Other Studies: I have personally reviewed pertinent reports.      VTE Pharmacologic Prophylaxis: Heparin    VTE Mechanical " Prophylaxis: sequential compression device    PLEASE NOTE:  This encounter may have been completed utilizing the [employer] Direct Speech Voice Recognition Software. Grammatical errors, random word insertions, pronoun errors and incomplete sentences are occasional consequences of the system due to software limitations, ambient noise and hardware issues.These may be missed by proof reading prior to affixing electronic signature. Any questions or concerns about the content, text or information contained within the body of this dictation should be directly addressed to the advanced practitioner or physician for clarification.

## 2024-01-25 NOTE — ASSESSMENT & PLAN NOTE
Multiple hypoattenuating hepatic lesions   In light of ongoing malignancy work-up, recommend further characterization with contrast-enhanced abdominal MRI.    Outpatient further workup

## 2024-01-25 NOTE — RESTORATIVE TECHNICIAN NOTE
Restorative Technician Note      Patient Name: Mary Ashby     Note Type: Mobility  Patient Position Upon Consult: Bedside chair  Activity Performed: Ambulated; Dangled; Stood  Assistive Device: Other (Comment) (none)  Education Provided: Yes  Patient Position at End of Consult: Bedside chair; All needs within reach    Anjana FLORES, Restorative Technician,

## 2024-01-26 ENCOUNTER — TELEPHONE (OUTPATIENT)
Dept: HEMATOLOGY ONCOLOGY | Facility: CLINIC | Age: 64
End: 2024-01-26

## 2024-01-26 PROBLEM — K82.4 GALLBLADDER POLYP: Status: ACTIVE | Noted: 2024-01-26

## 2024-01-26 PROCEDURE — 99232 SBSQ HOSP IP/OBS MODERATE 35: CPT | Performed by: INTERNAL MEDICINE

## 2024-01-26 PROCEDURE — NC001 PR NO CHARGE: Performed by: NEUROLOGICAL SURGERY

## 2024-01-26 RX ORDER — PANTOPRAZOLE SODIUM 40 MG/1
40 TABLET, DELAYED RELEASE ORAL
Status: DISCONTINUED | OUTPATIENT
Start: 2024-01-26 | End: 2024-02-02 | Stop reason: HOSPADM

## 2024-01-26 RX ADMIN — DEXAMETHASONE 4 MG: 4 TABLET ORAL at 05:52

## 2024-01-26 RX ADMIN — HEPARIN SODIUM 5000 UNITS: 5000 INJECTION INTRAVENOUS; SUBCUTANEOUS at 05:52

## 2024-01-26 RX ADMIN — DEXAMETHASONE 4 MG: 4 TABLET ORAL at 17:57

## 2024-01-26 RX ADMIN — FLUOXETINE HYDROCHLORIDE 20 MG: 20 CAPSULE ORAL at 08:07

## 2024-01-26 RX ADMIN — DEXAMETHASONE 4 MG: 4 TABLET ORAL at 00:03

## 2024-01-26 RX ADMIN — DOCUSATE SODIUM 100 MG: 100 CAPSULE, LIQUID FILLED ORAL at 17:57

## 2024-01-26 RX ADMIN — DEXAMETHASONE 4 MG: 4 TABLET ORAL at 11:38

## 2024-01-26 RX ADMIN — LEVETIRACETAM 750 MG: 750 TABLET, FILM COATED ORAL at 21:05

## 2024-01-26 RX ADMIN — LEVETIRACETAM 750 MG: 750 TABLET, FILM COATED ORAL at 08:07

## 2024-01-26 RX ADMIN — DOCUSATE SODIUM 100 MG: 100 CAPSULE, LIQUID FILLED ORAL at 08:07

## 2024-01-26 RX ADMIN — HEPARIN SODIUM 5000 UNITS: 5000 INJECTION INTRAVENOUS; SUBCUTANEOUS at 21:05

## 2024-01-26 RX ADMIN — HEPARIN SODIUM 5000 UNITS: 5000 INJECTION INTRAVENOUS; SUBCUTANEOUS at 13:45

## 2024-01-26 RX ADMIN — LEVOTHYROXINE SODIUM 75 MCG: 75 TABLET ORAL at 05:52

## 2024-01-26 NOTE — ASSESSMENT & PLAN NOTE
Patient provided with blood consent/refusal form, scanned into chart  Hematology consulted, no need to further optimize Hb per their recs  Hb currently 14.5

## 2024-01-26 NOTE — PLAN OF CARE
Problem: PAIN - ADULT  Goal: Verbalizes/displays adequate comfort level or baseline comfort level  Description: Interventions:  - Encourage patient to monitor pain and request assistance  - Assess pain using appropriate pain scale  - Administer analgesics based on type and severity of pain and evaluate response  - Implement non-pharmacological measures as appropriate and evaluate response  - Consider cultural and social influences on pain and pain management  - Notify physician/advanced practitioner if interventions unsuccessful or patient reports new pain  Outcome: Progressing     Problem: SAFETY ADULT  Goal: Patient will remain free of falls  Description: INTERVENTIONS:  - Educate patient/family on patient safety including physical limitations  - Instruct patient to call for assistance with activity   - Consult OT/PT to assist with strengthening/mobility   - Keep Call bell within reach  - Keep bed low and locked with side rails adjusted as appropriate  - Keep care items and personal belongings within reach  - Initiate and maintain comfort rounds  - Make Fall Risk Sign visible to staff  - Offer Toileting every 2 Hours, in advance of need  - Initiate/Maintain 2alarm  - Obtain necessary fall risk management equipment: 2  - Apply yellow socks and bracelet for high fall risk patients  - Consider moving patient to room near nurses station  Outcome: Progressing  Goal: Maintain or return to baseline ADL function  Description: INTERVENTIONS:  -  Assess patient's ability to carry out ADLs; assess patient's baseline for ADL function and identify physical deficits which impact ability to perform ADLs (bathing, care of mouth/teeth, toileting, grooming, dressing, etc.)  - Assess/evaluate cause of self-care deficits   - Assess range of motion  - Assess patient's mobility; develop plan if impaired  - Assess patient's need for assistive devices and provide as appropriate  - Encourage maximum independence but intervene and  supervise when necessary  - Involve family in performance of ADLs  - Assess for home care needs following discharge   - Consider OT consult to assist with ADL evaluation and planning for discharge  - Provide patient education as appropriate  Outcome: Progressing  Goal: Maintains/Returns to pre admission functional level  Description: INTERVENTIONS:  - Perform AM-PAC 6 Click Basic Mobility/ Daily Activity assessment daily.  - Set and communicate daily mobility goal to care team and patient/family/caregiver.   - Collaborate with rehabilitation services on mobility goals if consulted  - Perform Range of Motion 2 times a day.  - Reposition patient every 2 hours.  - Dangle patient 2 times a day  - Stand patient 2 times a day  - Ambulate patient 2 times a day  - Out of bed to chair 2 times a day   - Out of bed for meals 2 times a day  - Out of bed for toileting  - Record patient progress and toleration of activity level   Outcome: Progressing     Problem: DISCHARGE PLANNING  Goal: Discharge to home or other facility with appropriate resources  Description: INTERVENTIONS:  - Identify barriers to discharge w/patient and caregiver  - Arrange for needed discharge resources and transportation as appropriate  - Identify discharge learning needs (meds, wound care, etc.)  - Arrange for interpretive services to assist at discharge as needed  - Refer to Case Management Department for coordinating discharge planning if the patient needs post-hospital services based on physician/advanced practitioner order or complex needs related to functional status, cognitive ability, or social support system  Outcome: Progressing     Problem: NEUROSENSORY - ADULT  Goal: Achieves stable or improved neurological status  Description: INTERVENTIONS  - Monitor and report changes in neurological status  - Monitor vital signs such as temperature, blood pressure, glucose, and any other labs ordered   - Initiate measures to prevent increased intracranial  pressure  - Monitor for seizure activity and implement precautions if appropriate      Outcome: Progressing  Goal: Remains free of injury related to seizures activity  Description: INTERVENTIONS  - Maintain airway, patient safety  and administer oxygen as ordered  - Monitor patient for seizure activity, document and report duration and description of seizure to physician/advanced practitioner  - If seizure occurs,  ensure patient safety during seizure  - Reorient patient post seizure  - Seizure pads on all 4 side rails  - Instruct patient/family to notify RN of any seizure activity including if an aura is experienced  - Instruct patient/family to call for assistance with activity based on nursing assessment  - Administer anti-seizure medications if ordered    Outcome: Progressing  Goal: Achieves maximal functionality and self care  Description: INTERVENTIONS  - Monitor swallowing and airway patency with patient fatigue and changes in neurological status  - Encourage and assist patient to increase activity and self care.   - Encourage visually impaired, hearing impaired and aphasic patients to use assistive/communication devices  Outcome: Progressing

## 2024-01-26 NOTE — PROGRESS NOTES
[facility]  Progress Note  Name: Mary Ashby I  MRN: [id number]  Unit/Bed#: [facility] 718-01 I Date of Admission: 1/24/2024   Date of Service: 1/26/2024 I Hospital Day: 2    Assessment/Plan   * Meningioma (HCC)  Assessment & Plan  Patient with left frontal meningioma, presented with word finding difficulties intermittently for 5 years, increasing  for >1 month    Imaging reviewed personally and by attending. Final results as below  MRI 1/23/24: 4.3 cm probable meningioma in anterior aspect of left middle cranial fossa with surrounding edema    Plan  Continue regular neurologic checks   Decadron 4mg q6  Keppra 750 bid for seizure ppx  Ongoing medical management per primary team   Pain control per primary  Eval and mobilize per PT/OT  DVT PPX: SCDs, ok for dvt ppx    Surgical planning, will need cerebral angiogram tentatively Monday with surgical tumor resection Tuesday MOCA today 24/30  Neurosurgery will continue to follow. Please call with any questions or concerns.      Blood transfusion declined because patient is Lutheran  Assessment & Plan  Patient provided with blood consent/refusal form, scanned into chart  Hematology consulted, no need to further optimize Hb per their recs  Hb currently 14.5               Subjective/Objective     Chief Complaint: None    Subjective: Patient seen and examined at bedside.  She says that she is feeling great has no complaints today.  She says she has not had word finding difficulty since coming into the hospital.  She reports having a good support network who are ready to help care for her and her family when she is done in the hospital.  No questions about surgery at this time.    Objective: Well-appearing woman in no acute distress.  Pleasant and conversive.    I/O         01/24 0701 01/25 0700 01/25 0701 01/26 0700 01/26 0701 01/27 0700    P.O.  480     Total Intake(mL/kg)  480 (7.7)     Net  +480            Unmeasured Urine  "Occurrence   1 x            Invasive Devices       Peripheral Intravenous Line  Duration             Peripheral IV 01/24/24 Left Antecubital 1 day                    Physical Exam:  Vitals: Blood pressure 138/75, pulse 65, temperature 98.1 °F (36.7 °C), resp. rate 17, height 5' 2\" (1.575 m), weight 62.6 kg (138 lb 0.1 oz), SpO2 96%, not currently breastfeeding.,Body mass index is 25.24 kg/m².      General appearance:  Appears stated age  Head: Normocephalic, without obvious abnormality, atraumatic  Eyes: EOMI, PERRL  Neck: supple, symmetrical, trachea midline   Lungs: non labored breathing  Heart: regular heart rate  Neurologic:   Mental status: Alert, oriented x3, thought content appropriate. MOCA done 1/26 score 24/30  Cranial nerves: grossly intact (Cranial nerves II-XII)  Sensory: normal to light touch throughout  Motor: moving all extremities without focal weakness , strength 5/5 all extremities       Lab Results:  Results from last 7 days   Lab Units 01/25/24  0603 01/23/24  1519   WBC Thousand/uL 10.77* 4.78   HEMOGLOBIN g/dL 14.5 14.2   HEMATOCRIT % 44.1 42.3   PLATELETS Thousands/uL 174 152   NEUTROS PCT %  --  64   MONOS PCT %  --  8   EOS PCT %  --  1     Results from last 7 days   Lab Units 01/25/24  0603 01/23/24  1519   POTASSIUM mmol/L 4.0 3.8   CHLORIDE mmol/L 104 100   CO2 mmol/L 27 28   BUN mg/dL 15 19   CREATININE mg/dL 0.53* 0.63   CALCIUM mg/dL 9.5 9.6   ALK PHOS U/L 65 81   ALT U/L 22 21   AST U/L 19 23     Results from last 7 days   Lab Units 01/23/24  1519   MAGNESIUM mg/dL 2.2         Results from last 7 days   Lab Units 01/25/24  0603 01/23/24  1519   INR   --  1.03   PTT seconds 37 40*     No results found for: \"TROPONINT\"  ABG:No results found for: \"PHART\", \"SBM2ZCL\", \"PO2ART\", \"ZVC4CAV\", \"E0HSINYT\", \"BEART\", \"SOURCE\"    Imaging Studies: I have personally reviewed pertinent reports and I have personally reviewed pertinent films in PACS    EKG, Pathology, and Other Studies: I have " personally reviewed pertinent reports.      VTE Pharmacologic Prophylaxis: Heparin    VTE Mechanical Prophylaxis: sequential compression device    PLEASE NOTE:  This encounter may have been completed utilizing the [employer] Direct Speech Voice Recognition Software. Grammatical errors, random word insertions, pronoun errors and incomplete sentences are occasional consequences of the system due to software limitations, ambient noise and hardware issues.These may be missed by proof reading prior to affixing electronic signature. Any questions or concerns about the content, text or information contained within the body of this dictation should be directly addressed to the advanced practitioner or physician for clarification.

## 2024-01-26 NOTE — TELEPHONE ENCOUNTER
New Patient Intake Form   Patient Details:    Mary Ashby  1960    Appointment Information   Who is calling to schedule? Paz Marks   If not self, what is the caller's name?   NA   DID YOU CONFIRM INSURANCE WITH PATIENT? E verified, Routed to finance   Referring provider Dr. Thapa   What is the diagnosis?  Elevated PTT, Abnormal CT C/A/P findings     Is there a confirmed tissue diagnosis?   NA     Is there a biopsy ordered or pending?  Please specify dates  If yes, route to /OCC   NA     Is patient aware of diagnosis?   Yes     Have you had any imaging or labs done?  If yes, where?  (If imaging done outside of [facility], please remind patient to bring a disk.) Yes-[facility]     If imaging done at outside facility, did you instruct patient to obtain discs and bring to visit? NA   Have you been seen by another Oncologist/Hematologist?  If so, who and where? No   Are the records in [facility] or Care Everywhere? Yes   Does the patient have records at another facility/hospital?    If yes, Name of facility, city and state where facility is located. [facility] (2016, 2017)     Did you instruct patient to have records faxed to rightx and provide rightfax number?   NA   Preferred [place]   Is the patient willing to be seen by another provider?  (This is for breast patients only) NA     Did you send new patient paperwork?  Email or mail? NA   Miscellaneous Information: The patient is scheduled for her HFU appointment with Dr. De Los Santos on 3/5 at 0900 in the [facility].

## 2024-01-26 NOTE — CASE MANAGEMENT
Case Management Assessment & Discharge Planning Note    Patient name Mary Ashby  Location [facility]-01 MRN [id number]  : 1960 Date 2024       Current Admission Date: 2024  Current Admission Diagnosis:Meningioma (HCC)   Patient Active Problem List    Diagnosis Date Noted    Gallbladder polyp 2024    Lung nodules 2024    Hepatic lesion 2024    Abnormal CT scan, lumbar spine 2024    Blood transfusion declined because patient is Religion 2024    Leukopenia 2024    Meningioma (HCC) 2024    Anxiety 2023    Encounter for gynecological examination (general) (routine) without abnormal findings 2022    Lipid screening 2018    Need for hepatitis C screening test 2018    Hypothyroidism 2016      LOS (days): 2  Geometric Mean LOS (GMLOS) (days): 4  Days to GMLOS:1.8     OBJECTIVE:    Risk of Unplanned Readmission Score: 7.36         Current admission status: Inpatient       Preferred Pharmacy:   [facility] [address]  [address]  [address]  [facility] [address]  Phone: [phone] Fax: [phone]    Primary Care Provider: Sinan Palacios PA-C    Primary Insurance: [employer]  Secondary Insurance:     ASSESSMENT:  Active Health Care Proxies    There are no active Health Care Proxies on file.                 Readmission Root Cause  30 Day Readmission: No    Patient Information  Admitted from:: Home  Mental Status: Alert  During Assessment patient was accompanied by: Other-Comment, Parent (niece)  Assessment information provided by:: Patient, Other - please comment (niece)  Primary Caregiver: Self  Support Systems: Self, Family members  County of Residence: [place]  What city do you live in?: [place]  Type of Current Residence: Other (Comment) (private resdience)  Living Arrangements: Lives w/ Spouse/significant other  Is patient a ?:  No    Activities of Daily Living Prior to Admission  Functional Status: Independent  Completes ADLs independently?: Yes  Ambulates independently?: Yes  Does patient use assisted devices?: No  Does patient currently own DME?: No  Does patient have a history of Outpatient Therapy (PT/OT)?: No  Does the patient have a history of Short-Term Rehab?: No  Does patient have a history of HHC?: No  Does patient currently have HHC?: No         Patient Information Continued  Income Source: Pension/halfway  Does patient have prescription coverage?: Yes  Does patient receive dialysis treatments?: No  Does patient have a history of substance abuse?: No  Does patient have a history of Mental Health Diagnosis?: No         Means of Transportation  Means of Transport to Appts:: Drives Self      Housing Stability: Low Risk  (1/26/2024)    Housing Stability Vital Sign     Unable to Pay for Housing in the Last Year: No     Number of Places Lived in the Last Year: 1     Unstable Housing in the Last Year: No   Food Insecurity: No Food Insecurity (1/26/2024)    Hunger Vital Sign     Worried About Running Out of Food in the Last Year: Never true     Ran Out of Food in the Last Year: Never true   Transportation Needs: No Transportation Needs (1/26/2024)    PRAPARE - Transportation     Lack of Transportation (Medical): No     Lack of Transportation (Non-Medical): No   Utilities: Not At Risk (1/26/2024)    [facility] Utilities     Threatened with loss of utilities: No       DISCHARGE DETAILS:    Discharge planning discussed with:: bedside with pt and niece  Freedom of Choice: No  Comments - Freedom of Choice: No aftercare recsc  CM contacted family/caregiver?: No- see comments (pt is A&O)             Contacts  Patient Contacts: Mora Silverman  Relationship to Patient:: Family (daughter)  Contact Method: Phone  Phone Number: [phone]  Reason/Outcome: Continuity of Care, Emergency Contact, Discharge Planning       Other  Referral/Resources/Interventions Provided:  Interventions: Other (Specify)  Referral Comments: Information on waiver services                Additional Comments: CM spoke with pt and niece bedside. 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 Spouse has parkinson's, cath,pacemaker, is currently a-fib and not doing anything. He needs assitance with ADL's. At this time friends are with spouse (8a-1p, 1p-3p), son checks in when he gets home at 3:30p. Son brings dinner and put pt to bed at 10p and wakes him at 6a and gives him breakfast. Pt son lives next door which is convienient. Pt will go to her daughter's home and stay until she feels she is able to be alone and care for both herself and spouse. CM provided info on waiver and suggested as a family they discuss respite/nursing care for the spouse. Per family the spouse is very demanding and needy. Pt requested spouse be removed as contact and son Hay Ashby [phone] be placed as contact. At discharge pt will live with her daughter Pamela @ [address] [place] [address] [facility] CM will make changes in demographics

## 2024-01-26 NOTE — ASSESSMENT & PLAN NOTE
Patient with left frontal meningioma, presented with word finding difficulties intermittently for 5 years, increasing  for >1 month    Imaging reviewed personally and by attending. Final results as below  MRI 1/23/24: 4.3 cm probable meningioma in anterior aspect of left middle cranial fossa with surrounding edema    Plan  Continue regular neurologic checks   Decadron 4mg q6  Keppra 750 bid for seizure ppx  Ongoing medical management per primary team   Pain control per primary  Eval and mobilize per PT/OT  DVT PPX: SCDs, ok for dvt ppx    Surgical planning, will need cerebral angiogram tentatively Monday with surgical tumor resection Tuesday  MOCA today 24/30  Neurosurgery will continue to follow. Please call with any questions or concerns.

## 2024-01-26 NOTE — PROGRESS NOTES
[facility]  Progress Note  Name: Mary Ashby I  MRN: [id number]  Unit/Bed#: [facility] 718-01 I Date of Admission: 1/24/2024   Date of Service: 1/26/2024 I Hospital Day: 2    Assessment/Plan   * Meningioma (HCC)  Assessment & Plan  4.3 cm meningioma w/ vasogenic edema and midline shift present on outpatient MRI  Evaluated by neurosurgery  Patient was started on Decadron IV to help with edema  Started on Keppra for seizure prophylaxis  Aspirin on hold  Plan for cerebral angiogram and surgical tumor resection early next week  Neurochecks    Abnormal CT scan, lumbar spine  Assessment & Plan  Multiple ill-defined foci of enhancement in the lumbar paraspinal musculature seen on CT scan    Lumbar spine MRI with paraspinal intramuscular masses. Imaging morphology most compatible with hemangiomas. Myxoma, sarcoma and metastases are considered less likely     Outpatient follow-up    Hepatic lesion  Assessment & Plan  Multiple hypoattenuating hepatic lesions seen on CT scan  Abdominal MRI consistent with tiny hepatic cysts, considered benign findings     Outpatient follow-up    Lung nodules  Assessment & Plan  CT scan with multiple bilateral pulmonary nodules measuring 4 mm   Recommendation for follow-up chest CT in 3 months to exclude metastatic disease.    Gallbladder polyp  Assessment & Plan  Abdominal MRI with Findings most compatible with gallbladder cholesterolosis/tiny polyps, typically benign.   Recommend confirmation with right upper quadrant ultrasound.   Outpatient workup    Blood transfusion declined because patient is Nondenominational  Assessment & Plan  Hematology consulted  Stable H&H  Continue to monitor    Anxiety  Assessment & Plan  Continue Prozac  Patient reported that she is the primary caregiver for her parents and her  and she is under a lot of stress at home    Hypothyroidism  Assessment & Plan  Patient is on Synthroid as outpatient which we will  continue  TSH normal on              VTE Pharmacologic Prophylaxis: VTE Score: 3 Moderate Risk (Score 3-4) - Pharmacological DVT Prophylaxis Ordered: heparin.    Mobility:   Basic Mobility Inpatient Raw Score: 24  JH-HLM Goal: 8: Walk 250 feet or more  JH-HLM Achieved: 8: Walk 250 feet ot more  HLM Goal achieved. Continue to encourage appropriate mobility.    Patient Centered Rounds: I performed bedside rounds with nursing staff today.   Discussions with Specialists or Other Care Team Provider: AWA    Education and Discussions with Family / Patient: Updated  (son in law) via phone.    Total Time Spent on Date of Encounter in care of patient: 35 mins. This time was spent on one or more of the following: performing physical exam; counseling and coordination of care; obtaining or reviewing history; documenting in the medical record; reviewing/ordering tests, medications or procedures; communicating with other healthcare professionals and discussing with patient's family/caregivers.    Current Length of Stay: 2 day(s)  Current Patient Status: Inpatient   Certification Statement: The patient will continue to require additional inpatient hospital stay due to management of brain meningioma      Code Status: Level 1 - Full Code    Subjective:   Patient seen and examined  Comfortable in chair  No complaints  No event overnight    Objective:     Vitals:   Temp (24hrs), Av.2 °F (36.8 °C), Min:97.7 °F (36.5 °C), Max:99 °F (37.2 °C)    Temp:  [97.7 °F (36.5 °C)-99 °F (37.2 °C)] 98.1 °F (36.7 °C)  HR:  [63-69] 65  Resp:  [16-17] 17  BP: (138-145)/(75-78) 138/75  SpO2:  [94 %-97 %] 96 %  Body mass index is 25.24 kg/m².     Input and Output Summary (last 24 hours):   No intake or output data in the 24 hours ending 24 1005    Physical Exam:   Physical Exam   Patient is awake alert oriented no acute distress  Comfortable sitting in chair  Lungs clear to auscultation bilateral  Heart positive S1-S2 no  murmur  Abdomen soft nontender  Lower extremities no edema    Additional Data:     Labs:  Results from last 7 days   Lab Units 01/25/24  0603 01/23/24  1519   WBC Thousand/uL 10.77* 4.78   HEMOGLOBIN g/dL 14.5 14.2   HEMATOCRIT % 44.1 42.3   PLATELETS Thousands/uL 174 152   NEUTROS PCT %  --  64   LYMPHS PCT %  --  26   MONOS PCT %  --  8   EOS PCT %  --  1     Results from last 7 days   Lab Units 01/25/24  0603   SODIUM mmol/L 140   POTASSIUM mmol/L 4.0   CHLORIDE mmol/L 104   CO2 mmol/L 27   BUN mg/dL 15   CREATININE mg/dL 0.53*   ANION GAP mmol/L 9   CALCIUM mg/dL 9.5   ALBUMIN g/dL 4.4   TOTAL BILIRUBIN mg/dL 0.73   ALK PHOS U/L 65   ALT U/L 22   AST U/L 19   GLUCOSE RANDOM mg/dL 129     Results from last 7 days   Lab Units 01/23/24  1519   INR  1.03                   Lines/Drains:  Invasive Devices       Peripheral Intravenous Line  Duration             Peripheral IV 01/24/24 Left Antecubital 1 day                          Imaging: MRI results reviewed    Recent Cultures (last 7 days):         Last 24 Hours Medication List:   Current Facility-Administered Medications   Medication Dose Route Frequency Provider Last Rate    acetaminophen  650 mg Oral Q6H PRN Nighat Mejias MD      dexamethasone  4 mg Oral Q6H KEELY Loreto Kang PA-C      docusate sodium  100 mg Oral BID Nighat Mejias MD      FLUoxetine  20 mg Oral Daily Nighat Mejias MD      heparin (porcine)  5,000 Units Subcutaneous Q8H [facility] Nighat Mejias MD      levETIRAcetam  750 mg Oral Q12H [facility] Loreto Kang PA-C      levothyroxine  75 mcg Oral Early Morning Nighat Mejias MD      pantoprazole  40 mg Oral Early Morning Joseph Borges DO          Today, Patient Was Seen By: Joseph Borges DO    **Please Note: This note may have been constructed using a voice recognition system.**

## 2024-01-26 NOTE — ASSESSMENT & PLAN NOTE
Multiple ill-defined foci of enhancement in the lumbar paraspinal musculature seen on CT scan    Lumbar spine MRI with paraspinal intramuscular masses. Imaging morphology most compatible with hemangiomas. Myxoma, sarcoma and metastases are considered less likely     Outpatient follow-up

## 2024-01-26 NOTE — ASSESSMENT & PLAN NOTE
Abdominal MRI with Findings most compatible with gallbladder cholesterolosis/tiny polyps, typically benign.   Recommend confirmation with right upper quadrant ultrasound.   Outpatient workup

## 2024-01-26 NOTE — ASSESSMENT & PLAN NOTE
Multiple hypoattenuating hepatic lesions seen on CT scan  Abdominal MRI consistent with tiny hepatic cysts, considered benign findings     Outpatient follow-up

## 2024-01-27 PROCEDURE — 99232 SBSQ HOSP IP/OBS MODERATE 35: CPT | Performed by: INTERNAL MEDICINE

## 2024-01-27 RX ADMIN — LEVETIRACETAM 750 MG: 750 TABLET, FILM COATED ORAL at 08:33

## 2024-01-27 RX ADMIN — DEXAMETHASONE 4 MG: 4 TABLET ORAL at 05:20

## 2024-01-27 RX ADMIN — DOCUSATE SODIUM 100 MG: 100 CAPSULE, LIQUID FILLED ORAL at 08:33

## 2024-01-27 RX ADMIN — FLUOXETINE HYDROCHLORIDE 20 MG: 20 CAPSULE ORAL at 08:33

## 2024-01-27 RX ADMIN — DEXAMETHASONE 4 MG: 4 TABLET ORAL at 00:04

## 2024-01-27 RX ADMIN — LEVETIRACETAM 750 MG: 750 TABLET, FILM COATED ORAL at 20:21

## 2024-01-27 RX ADMIN — LEVOTHYROXINE SODIUM 75 MCG: 75 TABLET ORAL at 05:20

## 2024-01-27 RX ADMIN — DEXAMETHASONE 4 MG: 4 TABLET ORAL at 12:24

## 2024-01-27 RX ADMIN — DEXAMETHASONE 4 MG: 4 TABLET ORAL at 18:17

## 2024-01-27 RX ADMIN — HEPARIN SODIUM 5000 UNITS: 5000 INJECTION INTRAVENOUS; SUBCUTANEOUS at 05:20

## 2024-01-27 RX ADMIN — PANTOPRAZOLE SODIUM 40 MG: 40 TABLET, DELAYED RELEASE ORAL at 05:20

## 2024-01-27 NOTE — ASSESSMENT & PLAN NOTE
4.3 cm meningioma w/ vasogenic edema and midline shift present on outpatient MRI  Evaluated by neurosurgery  Patient was started on Decadron IV to help with edema  Started on Keppra for seizure prophylaxis  PPI was added for gastric protection  Aspirin on hold  Plan for cerebral angiogram and surgical tumor resection early next week  Neurochecks

## 2024-01-27 NOTE — PROGRESS NOTES
[facility]  Progress Note  Name: Mary Ashby I  MRN: [id number]  Unit/Bed#: [facility] 718-01 I Date of Admission: 1/24/2024   Date of Service: 1/27/2024 I Hospital Day: 3    Assessment/Plan   * Meningioma (HCC)  Assessment & Plan  4.3 cm meningioma w/ vasogenic edema and midline shift present on outpatient MRI  Evaluated by neurosurgery  Patient was started on Decadron IV to help with edema  Started on Keppra for seizure prophylaxis  PPI was added for gastric protection  Aspirin on hold  Plan for cerebral angiogram and surgical tumor resection early next week  Neurochecks    Abnormal CT scan, lumbar spine  Assessment & Plan  Multiple ill-defined foci of enhancement in the lumbar paraspinal musculature seen on CT scan    Lumbar spine MRI with paraspinal intramuscular masses. Imaging morphology most compatible with hemangiomas. Myxoma, sarcoma and metastases are considered less likely     Outpatient follow-up    Hepatic lesion  Assessment & Plan  Multiple hypoattenuating hepatic lesions seen on CT scan  Abdominal MRI consistent with tiny hepatic cysts, considered benign findings     Outpatient follow-up    Lung nodules  Assessment & Plan  CT scan with multiple bilateral pulmonary nodules measuring 4 mm   Recommendation for follow-up chest CT in 3 months to exclude metastatic disease.    Gallbladder polyp  Assessment & Plan  Abdominal MRI with Findings most compatible with gallbladder cholesterolosis/tiny polyps, typically benign.   Recommend confirmation with right upper quadrant ultrasound.   Outpatient workup    Blood transfusion declined because patient is Restorationism  Assessment & Plan  Hematology consulted  Stable H&H  Continue to monitor    Anxiety  Assessment & Plan  Continue Prozac  Patient reported that she is the primary caregiver for her parents and her  and she is under a lot of stress at home    Hypothyroidism  Assessment & Plan  Patient is on Synthroid  as outpatient which we will continue  TSH normal on              VTE Pharmacologic Prophylaxis: VTE Score: 3  patient prefer not to take subcu heparin because she is ambulating in the hallways    Mobility:   Basic Mobility Inpatient Raw Score: 24  JH-HLM Goal: 8: Walk 250 feet or more  JH-HLM Achieved: 8: Walk 250 feet ot more  HLM Goal achieved. Continue to encourage appropriate mobility.    Patient Centered Rounds: I performed bedside rounds with nursing staff today.   Discussions with Specialists or Other Care Team Provider:     Total Time Spent on Date of Encounter in care of patient: 35 mins. This time was spent on one or more of the following: performing physical exam; counseling and coordination of care; obtaining or reviewing history; documenting in the medical record; reviewing/ordering tests, medications or procedures; communicating with other healthcare professionals and discussing with patient's family/caregivers.    Current Length of Stay: 3 day(s)  Current Patient Status: Inpatient   Certification Statement: The patient will continue to require additional inpatient hospital stay due to brain surgery  Discharge Plan: Anticipate discharge in >72 hrs to TBD    Code Status: Level 1 - Full Code    Subjective:   Patient seen and examined  Comfortable in her room  No complaints    Objective:     Vitals:   Temp (24hrs), Av.9 °F (36.6 °C), Min:97.6 °F (36.4 °C), Max:98.3 °F (36.8 °C)    Temp:  [97.6 °F (36.4 °C)-98.3 °F (36.8 °C)] 98.3 °F (36.8 °C)  HR:  [56-69] 59  Resp:  [16] 16  BP: (134-169)/() 152/85  SpO2:  [96 %-98 %] 97 %  Body mass index is 25.24 kg/m².     Input and Output Summary (last 24 hours):   No intake or output data in the 24 hours ending 24 1121    Physical Exam:   Physical Exam   Patient is awake alert oriented no acute distress  Comfortable ambulating in her room  Lungs clear to auscultation bilateral  Heart positive S1-S2 no murmur  Abdomen soft nontender  Lower  extremities no edema    Additional Data:     Labs:  Results from last 7 days   Lab Units 01/25/24  0603 01/23/24  1519   WBC Thousand/uL 10.77* 4.78   HEMOGLOBIN g/dL 14.5 14.2   HEMATOCRIT % 44.1 42.3   PLATELETS Thousands/uL 174 152   NEUTROS PCT %  --  64   LYMPHS PCT %  --  26   MONOS PCT %  --  8   EOS PCT %  --  1     Results from last 7 days   Lab Units 01/25/24  0603   SODIUM mmol/L 140   POTASSIUM mmol/L 4.0   CHLORIDE mmol/L 104   CO2 mmol/L 27   BUN mg/dL 15   CREATININE mg/dL 0.53*   ANION GAP mmol/L 9   CALCIUM mg/dL 9.5   ALBUMIN g/dL 4.4   TOTAL BILIRUBIN mg/dL 0.73   ALK PHOS U/L 65   ALT U/L 22   AST U/L 19   GLUCOSE RANDOM mg/dL 129     Results from last 7 days   Lab Units 01/23/24  1519   INR  1.03                   Lines/Drains:  Invasive Devices       Peripheral Intravenous Line  Duration             Peripheral IV 01/24/24 Left Antecubital 2 days                          Imaging: No pertinent imaging reviewed.    Recent Cultures (last 7 days):         Last 24 Hours Medication List:   Current Facility-Administered Medications   Medication Dose Route Frequency Provider Last Rate    acetaminophen  650 mg Oral Q6H PRN Nighat Mejias MD      dexamethasone  4 mg Oral Q6H KEELY Kang PA-C      docusate sodium  100 mg Oral BID Nighat Mejias MD      FLUoxetine  20 mg Oral Daily Nighat Mejias MD      levETIRAcetam  750 mg Oral Q12H KEELY Kang PA-C      levothyroxine  75 mcg Oral Early Morning Nighat Mejias MD      pantoprazole  40 mg Oral Early Morning Joseph Borges DO          Today, Patient Was Seen By: Joseph Borges DO    **Please Note: This note may have been constructed using a voice recognition system.**

## 2024-01-27 NOTE — PLAN OF CARE
Problem: PAIN - ADULT  Goal: Verbalizes/displays adequate comfort level or baseline comfort level  Description: Interventions:  - Encourage patient to monitor pain and request assistance  - Assess pain using appropriate pain scale  - Administer analgesics based on type and severity of pain and evaluate response  - Implement non-pharmacological measures as appropriate and evaluate response  - Consider cultural and social influences on pain and pain management  - Notify physician/advanced practitioner if interventions unsuccessful or patient reports new pain  Outcome: Progressing     Problem: SAFETY ADULT  Goal: Patient will remain free of falls  Description: INTERVENTIONS:  - Educate patient/family on patient safety including physical limitations  - Instruct patient to call for assistance with activity   - Consult OT/PT to assist with strengthening/mobility   - Keep Call bell within reach  - Keep bed low and locked with side rails adjusted as appropriate  - Keep care items and personal belongings within reach  - Initiate and maintain comfort rounds  - Make Fall Risk Sign visible to staff  - Offer Toileting every 2 Hours, in advance of need  - Initiate/Maintain bed alarm  - Obtain necessary fall risk management equipment: bed alarm  - Apply yellow socks and bracelet for high fall risk patients  - Consider moving patient to room near nurses station  Outcome: Progressing  Goal: Maintain or return to baseline ADL function  Description: INTERVENTIONS:  -  Assess patient's ability to carry out ADLs; assess patient's baseline for ADL function and identify physical deficits which impact ability to perform ADLs (bathing, care of mouth/teeth, toileting, grooming, dressing, etc.)  - Assess/evaluate cause of self-care deficits   - Assess range of motion  - Assess patient's mobility; develop plan if impaired  - Assess patient's need for assistive devices and provide as appropriate  - Encourage maximum independence but intervene  and supervise when necessary  - Involve family in performance of ADLs  - Assess for home care needs following discharge   - Consider OT consult to assist with ADL evaluation and planning for discharge  - Provide patient education as appropriate  Outcome: Progressing  Goal: Maintains/Returns to pre admission functional level  Description: INTERVENTIONS:  - Perform AM-PAC 6 Click Basic Mobility/ Daily Activity assessment daily.  - Set and communicate daily mobility goal to care team and patient/family/caregiver.   - Collaborate with rehabilitation services on mobility goals if consulted  - Perform Range of Motion 3 times a day.  - Reposition patient every 2 hours.  - Dangle patient 3 times a day  - Stand patient 3 times a day  - Ambulate patient 3 times a day  - Out of bed to chair 3 times a day   - Out of bed for meals 3 times a day  - Out of bed for toileting  - Record patient progress and toleration of activity level   Outcome: Progressing     Problem: DISCHARGE PLANNING  Goal: Discharge to home or other facility with appropriate resources  Description: INTERVENTIONS:  - Identify barriers to discharge w/patient and caregiver  - Arrange for needed discharge resources and transportation as appropriate  - Identify discharge learning needs (meds, wound care, etc.)  - Arrange for interpretive services to assist at discharge as needed  - Refer to Case Management Department for coordinating discharge planning if the patient needs post-hospital services based on physician/advanced practitioner order or complex needs related to functional status, cognitive ability, or social support system  Outcome: Progressing     Problem: Knowledge Deficit  Goal: Patient/family/caregiver demonstrates understanding of disease process, treatment plan, medications, and discharge instructions  Description: Complete learning assessment and assess knowledge base.  Interventions:  - Provide teaching at level of understanding  - Provide teaching  via preferred learning methods  Outcome: Progressing     Problem: NEUROSENSORY - ADULT  Goal: Achieves stable or improved neurological status  Description: INTERVENTIONS  - Monitor and report changes in neurological status  - Monitor vital signs such as temperature, blood pressure, glucose, and any other labs ordered   - Initiate measures to prevent increased intracranial pressure  - Monitor for seizure activity and implement precautions if appropriate      Outcome: Progressing  Goal: Remains free of injury related to seizures activity  Description: INTERVENTIONS  - Maintain airway, patient safety  and administer oxygen as ordered  - Monitor patient for seizure activity, document and report duration and description of seizure to physician/advanced practitioner  - If seizure occurs,  ensure patient safety during seizure  - Reorient patient post seizure  - Seizure pads on all 4 side rails  - Instruct patient/family to notify RN of any seizure activity including if an aura is experienced  - Instruct patient/family to call for assistance with activity based on nursing assessment  - Administer anti-seizure medications if ordered    Outcome: Progressing  Goal: Achieves maximal functionality and self care  Description: INTERVENTIONS  - Monitor swallowing and airway patency with patient fatigue and changes in neurological status  - Encourage and assist patient to increase activity and self care.   - Encourage visually impaired, hearing impaired and aphasic patients to use assistive/communication devices  Outcome: Progressing

## 2024-01-28 ENCOUNTER — ANESTHESIA (OUTPATIENT)
Dept: ANESTHESIOLOGY | Facility: HOSPITAL | Age: 64
End: 2024-01-28

## 2024-01-28 ENCOUNTER — ANESTHESIA EVENT (OUTPATIENT)
Dept: ANESTHESIOLOGY | Facility: HOSPITAL | Age: 64
End: 2024-01-28

## 2024-01-28 PROCEDURE — 99232 SBSQ HOSP IP/OBS MODERATE 35: CPT | Performed by: NEUROLOGICAL SURGERY

## 2024-01-28 PROCEDURE — 99232 SBSQ HOSP IP/OBS MODERATE 35: CPT | Performed by: INTERNAL MEDICINE

## 2024-01-28 RX ORDER — SODIUM CHLORIDE, SODIUM LACTATE, POTASSIUM CHLORIDE, CALCIUM CHLORIDE 600; 310; 30; 20 MG/100ML; MG/100ML; MG/100ML; MG/100ML
125 INJECTION, SOLUTION INTRAVENOUS CONTINUOUS
Status: DISCONTINUED | OUTPATIENT
Start: 2024-01-29 | End: 2024-01-29

## 2024-01-28 RX ORDER — SODIUM CHLORIDE, SODIUM LACTATE, POTASSIUM CHLORIDE, CALCIUM CHLORIDE 600; 310; 30; 20 MG/100ML; MG/100ML; MG/100ML; MG/100ML
125 INJECTION, SOLUTION INTRAVENOUS CONTINUOUS
Status: DISCONTINUED | OUTPATIENT
Start: 2024-01-30 | End: 2024-01-28

## 2024-01-28 RX ORDER — SODIUM CHLORIDE, SODIUM LACTATE, POTASSIUM CHLORIDE, CALCIUM CHLORIDE 600; 310; 30; 20 MG/100ML; MG/100ML; MG/100ML; MG/100ML
125 INJECTION, SOLUTION INTRAVENOUS CONTINUOUS
Status: DISCONTINUED | OUTPATIENT
Start: 2024-01-28 | End: 2024-01-28

## 2024-01-28 RX ORDER — CHLORHEXIDINE GLUCONATE ORAL RINSE 1.2 MG/ML
15 SOLUTION DENTAL ONCE
Qty: 15 ML | Refills: 0 | Status: DISCONTINUED | OUTPATIENT
Start: 2024-01-30 | End: 2024-01-28

## 2024-01-28 RX ORDER — CHLORHEXIDINE GLUCONATE ORAL RINSE 1.2 MG/ML
15 SOLUTION DENTAL ONCE
Status: COMPLETED | OUTPATIENT
Start: 2024-01-29 | End: 2024-01-29

## 2024-01-28 RX ORDER — SODIUM CHLORIDE, SODIUM LACTATE, POTASSIUM CHLORIDE, CALCIUM CHLORIDE 600; 310; 30; 20 MG/100ML; MG/100ML; MG/100ML; MG/100ML
125 INJECTION, SOLUTION INTRAVENOUS CONTINUOUS
Status: CANCELLED | OUTPATIENT
Start: 2024-01-30

## 2024-01-28 RX ADMIN — DEXAMETHASONE 4 MG: 4 TABLET ORAL at 07:37

## 2024-01-28 RX ADMIN — DEXAMETHASONE 4 MG: 4 TABLET ORAL at 17:04

## 2024-01-28 RX ADMIN — DOCUSATE SODIUM 100 MG: 100 CAPSULE, LIQUID FILLED ORAL at 09:30

## 2024-01-28 RX ADMIN — FLUOXETINE HYDROCHLORIDE 20 MG: 20 CAPSULE ORAL at 09:30

## 2024-01-28 RX ADMIN — LEVETIRACETAM 750 MG: 750 TABLET, FILM COATED ORAL at 21:09

## 2024-01-28 RX ADMIN — PANTOPRAZOLE SODIUM 40 MG: 40 TABLET, DELAYED RELEASE ORAL at 07:37

## 2024-01-28 RX ADMIN — DEXAMETHASONE 4 MG: 4 TABLET ORAL at 00:10

## 2024-01-28 RX ADMIN — LEVETIRACETAM 750 MG: 750 TABLET, FILM COATED ORAL at 09:30

## 2024-01-28 RX ADMIN — DOCUSATE SODIUM 100 MG: 100 CAPSULE, LIQUID FILLED ORAL at 17:04

## 2024-01-28 RX ADMIN — LEVOTHYROXINE SODIUM 75 MCG: 75 TABLET ORAL at 07:37

## 2024-01-28 RX ADMIN — DEXAMETHASONE 4 MG: 4 TABLET ORAL at 11:47

## 2024-01-28 NOTE — PLAN OF CARE
Problem: PAIN - ADULT  Goal: Verbalizes/displays adequate comfort level or baseline comfort level  Description: Interventions:  - Encourage patient to monitor pain and request assistance  - Assess pain using appropriate pain scale  - Administer analgesics based on type and severity of pain and evaluate response  - Implement non-pharmacological measures as appropriate and evaluate response  - Consider cultural and social influences on pain and pain management  - Notify physician/advanced practitioner if interventions unsuccessful or patient reports new pain  Outcome: Progressing     Problem: SAFETY ADULT  Goal: Patient will remain free of falls  Description: INTERVENTIONS:  - Educate patient/family on patient safety including physical limitations  - Instruct patient to call for assistance with activity   - Consult OT/PT to assist with strengthening/mobility   - Keep Call bell within reach  - Keep bed low and locked with side rails adjusted as appropriate  - Keep care items and personal belongings within reach  - Initiate and maintain comfort rounds  - Make Fall Risk Sign visible to staff  - Apply yellow socks and bracelet for high fall risk patients  - Consider moving patient to room near nurses station  Outcome: Progressing     Problem: DISCHARGE PLANNING  Goal: Discharge to home or other facility with appropriate resources  Description: INTERVENTIONS:  - Identify barriers to discharge w/patient and caregiver  - Arrange for needed discharge resources and transportation as appropriate  - Identify discharge learning needs (meds, wound care, etc.)  - Arrange for interpretive services to assist at discharge as needed  - Refer to Case Management Department for coordinating discharge planning if the patient needs post-hospital services based on physician/advanced practitioner order or complex needs related to functional status, cognitive ability, or social support system  Outcome: Progressing     Problem: NEUROSENSORY -  ADULT  Goal: Achieves stable or improved neurological status  Description: INTERVENTIONS  - Monitor and report changes in neurological status  - Monitor vital signs such as temperature, blood pressure, glucose, and any other labs ordered   - Initiate measures to prevent increased intracranial pressure  - Monitor for seizure activity and implement precautions if appropriate      Outcome: Progressing

## 2024-01-28 NOTE — ASSESSMENT & PLAN NOTE
4.3 cm meningioma w/ vasogenic edema and midline shift present on outpatient MRI  Evaluated by neurosurgery  Patient was started on Decadron  to help with edema  Started on Keppra for seizure prophylaxis  PPI was added for gastric protection  Aspirin on hold  Plan for cerebral angiogram tomorrow and surgical tumor resection on Tuesday  Continue with neurochecks

## 2024-01-28 NOTE — ANESTHESIA PREPROCEDURE EVALUATION
Procedure:  PRE-OP ONLY    4.3 cm meningioma w vasogenic edema on MRI on Decadron and Keppra for seizure ppx. Word finding difficulties reported         Relevant Problems   ENDO   (+) Hypothyroidism      GI/HEPATIC   (+) Hepatic lesion      NEURO/PSYCH   (+) Anxiety        Physical Exam    Airway       Dental       Cardiovascular      Pulmonary      Other Findings  post-pubertal.      Anesthesia Plan  ASA Score- 2     Anesthesia Type-          Additional Monitors:     Airway Plan:     Comment: MAC vs general depending on intervention planned.       Plan Factors-    Chart reviewed. EKG reviewed.  Existing labs reviewed. Patient summary reviewed.                  Induction-     Postoperative Plan-     Informed Consent-

## 2024-01-28 NOTE — PROGRESS NOTES
[facility]  Progress Note  Name: Mary Ashby I  MRN: [id number]  Unit/Bed#: [facility] 718-01 I Date of Admission: 1/24/2024   Date of Service: 1/28/2024 I Hospital Day: 4    Assessment/Plan   * Meningioma (HCC)  Assessment & Plan  4.3 cm meningioma w/ vasogenic edema and midline shift present on outpatient MRI  Evaluated by neurosurgery  Patient was started on Decadron  to help with edema  Started on Keppra for seizure prophylaxis  PPI was added for gastric protection  Aspirin on hold  Plan for cerebral angiogram tomorrow and surgical tumor resection on Tuesday  Continue with neurochecks    Abnormal CT scan, lumbar spine  Assessment & Plan  Multiple ill-defined foci of enhancement in the lumbar paraspinal musculature seen on CT scan    Lumbar spine MRI with paraspinal intramuscular masses. Imaging morphology most compatible with hemangiomas. Myxoma, sarcoma and metastases are considered less likely     Outpatient follow-up    Hepatic lesion  Assessment & Plan  Multiple hypoattenuating hepatic lesions seen on CT scan  Abdominal MRI consistent with tiny hepatic cysts, considered benign findings     Outpatient follow-up    Lung nodules  Assessment & Plan  CT scan with multiple bilateral pulmonary nodules measuring 4 mm   Recommendation for follow-up chest CT in 3 months to exclude metastatic disease.    Gallbladder polyp  Assessment & Plan  Abdominal MRI with Findings most compatible with gallbladder cholesterolosis/tiny polyps, typically benign.   Recommend confirmation with right upper quadrant ultrasound.   Outpatient workup    Blood transfusion declined because patient is [employer]  Assessment & Plan  Hematology consulted  Stable H&H  Continue to monitor    Anxiety  Assessment & Plan  Continue Prozac  Patient reported that she is the primary caregiver for her parents and her  and she is under a lot of stress at home    Hypothyroidism  Assessment & Plan  Continue  levothyroxine  Normal TSH           VTE Pharmacologic Prophylaxis: Patient ambulating, prefer not to have heparin    Mobility:   Basic Mobility Inpatient Raw Score: 24  JH-HLM Goal: 8: Walk 250 feet or more  JH-HLM Achieved: 8: Walk 250 feet ot more  HLM Goal achieved. Continue to encourage appropriate mobility.    Patient Centered Rounds: I performed bedside rounds with nursing staff today.   Discussions with Specialists or Other Care Team Provider:       Total Time Spent on Date of Encounter in care of patient: 25 mins. This time was spent on one or more of the following: performing physical exam; counseling and coordination of care; obtaining or reviewing history; documenting in the medical record; reviewing/ordering tests, medications or procedures; communicating with other healthcare professionals and discussing with patient's family/caregivers.    Current Length of Stay: 4 day(s)  Current Patient Status: Inpatient   Certification Statement: The patient will continue to require additional inpatient hospital stay due to cerebral angiogram and brain surgery  Discharge Plan: Anticipate discharge in >72 hrs to TBD    Code Status: Level 1 - Full Code    Subjective:   Patient seen and examined  No complaints   comfortable sitting in chair      Objective:     Vitals:   Temp (24hrs), Av.9 °F (36.6 °C), Min:97.2 °F (36.2 °C), Max:98.6 °F (37 °C)    Temp:  [97.2 °F (36.2 °C)-98.6 °F (37 °C)] 98.6 °F (37 °C)  HR:  [57-65] 57  Resp:  [16] 16  BP: (137-166)/(81-88) 139/81  SpO2:  [96 %-97 %] 97 %  Body mass index is 25.24 kg/m².     Input and Output Summary (last 24 hours):   No intake or output data in the 24 hours ending 24 0940    Physical Exam:   Physical Exam   Patient is awake alert oriented no acute distress  Pleasant, comfortable sitting in in chair  Lungs clear to auscultation bilateral  Heart positive S1-S2 no murmur  Abdomen soft nontender  Lower extremities no edema    Additional Data:      Labs:  Results from last 7 days   Lab Units 01/25/24  0603 01/23/24  1519   WBC Thousand/uL 10.77* 4.78   HEMOGLOBIN g/dL 14.5 14.2   HEMATOCRIT % 44.1 42.3   PLATELETS Thousands/uL 174 152   NEUTROS PCT %  --  64   LYMPHS PCT %  --  26   MONOS PCT %  --  8   EOS PCT %  --  1     Results from last 7 days   Lab Units 01/25/24  0603   SODIUM mmol/L 140   POTASSIUM mmol/L 4.0   CHLORIDE mmol/L 104   CO2 mmol/L 27   BUN mg/dL 15   CREATININE mg/dL 0.53*   ANION GAP mmol/L 9   CALCIUM mg/dL 9.5   ALBUMIN g/dL 4.4   TOTAL BILIRUBIN mg/dL 0.73   ALK PHOS U/L 65   ALT U/L 22   AST U/L 19   GLUCOSE RANDOM mg/dL 129     Results from last 7 days   Lab Units 01/23/24  1519   INR  1.03                   Lines/Drains:  Invasive Devices       Peripheral Intravenous Line  Duration             Peripheral IV 01/28/24 Dorsal (posterior);Right Hand <1 day                          Imaging: No pertinent imaging reviewed.    Recent Cultures (last 7 days):         Last 24 Hours Medication List:   Current Facility-Administered Medications   Medication Dose Route Frequency Provider Last Rate    acetaminophen  650 mg Oral Q6H PRN Nighat Mejias MD      dexamethasone  4 mg Oral Q6H KEELY Kang PA-C      docusate sodium  100 mg Oral BID Nighat Mejias MD      FLUoxetine  20 mg Oral Daily Nighat Mejias MD      levETIRAcetam  750 mg Oral Q12H KEELY Kang PA-C      levothyroxine  75 mcg Oral Early Morning Nighat Mejias MD      pantoprazole  40 mg Oral Early Morning Joseph Borges DO          Today, Patient Was Seen By: Joseph Borges DO    **Please Note: This note may have been constructed using a voice recognition system.**

## 2024-01-28 NOTE — PROGRESS NOTES
"Progress Note - Neurosurgery   Mary Ashby 63 y.o. female MRN: [id number]  Unit/Bed#: [facility] 718-01 Encounter: [id number]    Assessment:  4.3 cm probable meningioma in the anterior left middle cranial fossa   word finding issues    Plan:  Exam: Patient alert and oriented x 3.  PERRL, EOMI centimeter conjugate bilaterally, speech improving,  moves all extremities.  Finger-to-nose test is normal and without drift bilateral.  Strength is 5/5 and sensation to light intact throughout. DTR 2+ no clonus bilaterally.   Imaging reviewed personally and by attending. Final results as below  MRI of brain with and without contrast on 1/23/2024 demonstrates a 4.3 cm mass in the left anterior aspect of middle cranial fossa most likely meningioma with surrounding vasogenic edema  Pain control-Per primary team  Mobilize-as tolerated  DVT PPX: SCDs bilaterally  Medical management-Per primary team  Neurocheck-close neuromonitoring.  Stat CT head if GCS drops 2-point/1H  Decadron 4 mg oral every 4 6H  Antiseizure medication-on Keppra 750 mg twice daily prophylaxis  PT evaluation and treatment  Patient to undergo cerebral angiogram tomorrow on 1/29/2024 by Dr. Lowery, preop order placed.  Patient needs to be n.p.o. after midnight only clear liquids for medication.  Tentative meningioma resection for Tuesday on 1/30/2024 by Dr. Lowery.  With question of concern.      Subjective/Objective   Chief Complaint: \" I am feeling better, my dizziness this morning probably low sugar\"    Subjective: Patient reports that she is doing fine, noticed slight dizziness this morning but felt better after she ate chocolate.  Otherwise denies any headache, vision issues, swallowing or speech problem.  Denies any weakness, numbness, or paresthesias extremities.  No gait issues, had bowel movement and no bladder dysfunction.  No fever, chills, rigors, cough or chest pain    Objective: Patient sitting on the edge of the bed, communicates well and in no " "apparent distress    I/O         01/26 0701 01/27 0700 01/27 0701 01/28 0700 01/28 0701 01/29 0700    P.O.       Total Intake(mL/kg)       Net              Unmeasured Urine Occurrence 1 x      Unmeasured Stool Occurrence  2 x             Invasive Devices       Peripheral Intravenous Line  Duration             Peripheral IV 01/28/24 Dorsal (posterior);Right Hand <1 day                    Physical Exam:  Vitals: Blood pressure 139/81, pulse 57, temperature 98.6 °F (37 °C), resp. rate 16, height 5' 2\" (1.575 m), weight 62.6 kg (138 lb 0.1 oz), SpO2 97%, not currently breastfeeding.,Body mass index is 25.24 kg/m².      General appearance: alert, appears stated age, cooperative and no distress  Head: Normocephalic, without obvious abnormality, atraumatic  Eyes: EOMI, PERRL, submillimeter conjugate bilaterally  Neck: supple, symmetrical, trachea midline and NT  Back: no kyphosis present, no tenderness to percussion or palpation  Lungs: non labored breathing  Heart: regular heart rate  Neurologic:   Mental status: Alert, oriented x 3, thought content appropriate  Cranial nerves: grossly intact (Cranial nerves II-XII)  Sensory: normal to light touch throughout  Motor: moving all extremities without focal weakness .  Strength 5/5 bilaterally  Reflexes: 2+ and symmetric.  No clonus symmetrical  Coordination: finger to nose normal bilaterally, no drift bilaterally      Lab Results:  Results from last 7 days   Lab Units 01/25/24  0603 01/23/24  1519   WBC Thousand/uL 10.77* 4.78   HEMOGLOBIN g/dL 14.5 14.2   HEMATOCRIT % 44.1 42.3   PLATELETS Thousands/uL 174 152   NEUTROS PCT %  --  64   MONOS PCT %  --  8   EOS PCT %  --  1     Results from last 7 days   Lab Units 01/25/24  0603 01/23/24  1519   POTASSIUM mmol/L 4.0 3.8   CHLORIDE mmol/L 104 100   CO2 mmol/L 27 28   BUN mg/dL 15 19   CREATININE mg/dL 0.53* 0.63   CALCIUM mg/dL 9.5 9.6   ALK PHOS U/L 65 81   ALT U/L 22 21   AST U/L 19 23     Results from last 7 days   Lab " "Units 01/23/24  1519   MAGNESIUM mg/dL 2.2         Results from last 7 days   Lab Units 01/25/24  0603 01/23/24  1519   INR   --  1.03   PTT seconds 37 40*     No results found for: \"TROPONINT\"  ABG:No results found for: \"PHART\", \"VKH1FKY\", \"PO2ART\", \"RHN8BEJ\", \"B7PDWHNY\", \"BEART\", \"SOURCE\"    Imaging Studies: I have personally reviewed pertinent reports.   and I have personally reviewed pertinent films in PACS    EKG, Pathology, and Other Studies: I have personally reviewed pertinent reports.      VTE Pharmacologic Prophylaxis: Sequential compression device (Venodyne)     VTE Mechanical Prophylaxis: sequential compression device     "

## 2024-01-28 NOTE — PLAN OF CARE
Problem: PAIN - ADULT  Goal: Verbalizes/displays adequate comfort level or baseline comfort level  Description: Interventions:  - Encourage patient to monitor pain and request assistance  - Assess pain using appropriate pain scale  - Administer analgesics based on type and severity of pain and evaluate response  - Implement non-pharmacological measures as appropriate and evaluate response  - Consider cultural and social influences on pain and pain management  - Notify physician/advanced practitioner if interventions unsuccessful or patient reports new pain  Outcome: Progressing     Problem: SAFETY ADULT  Goal: Patient will remain free of falls  Description: INTERVENTIONS:  - Educate patient/family on patient safety including physical limitations  - Instruct patient to call for assistance with activity   - Consult OT/PT to assist with strengthening/mobility   - Keep Call bell within reach  - Keep bed low and locked with side rails adjusted as appropriate  - Keep care items and personal belongings within reach  - Initiate and maintain comfort rounds  - Make Fall Risk Sign visible to staff  - Apply yellow socks and bracelet for high fall risk patients  - Consider moving patient to room near nurses station  Outcome: Progressing  Goal: Maintain or return to baseline ADL function  Description: INTERVENTIONS:  -  Assess patient's ability to carry out ADLs; assess patient's baseline for ADL function and identify physical deficits which impact ability to perform ADLs (bathing, care of mouth/teeth, toileting, grooming, dressing, etc.)  - Assess/evaluate cause of self-care deficits   - Assess range of motion  - Assess patient's mobility; develop plan if impaired  - Assess patient's need for assistive devices and provide as appropriate  - Encourage maximum independence but intervene and supervise when necessary  - Involve family in performance of ADLs  - Assess for home care needs following discharge   - Consider OT consult  to assist with ADL evaluation and planning for discharge  - Provide patient education as appropriate  Outcome: Progressing  Goal: Maintains/Returns to pre admission functional level  Description: INTERVENTIONS:  - Perform AM-PAC 6 Click Basic Mobility/ Daily Activity assessment daily.  - Set and communicate daily mobility goal to care team and patient/family/caregiver.   - Collaborate with rehabilitation services on mobility goals if consulted  - Out of bed for toileting  - Record patient progress and toleration of activity level   Outcome: Progressing     Problem: DISCHARGE PLANNING  Goal: Discharge to home or other facility with appropriate resources  Description: INTERVENTIONS:  - Identify barriers to discharge w/patient and caregiver  - Arrange for needed discharge resources and transportation as appropriate  - Identify discharge learning needs (meds, wound care, etc.)  - Arrange for interpretive services to assist at discharge as needed  - Refer to Case Management Department for coordinating discharge planning if the patient needs post-hospital services based on physician/advanced practitioner order or complex needs related to functional status, cognitive ability, or social support system  Outcome: Progressing     Problem: Knowledge Deficit  Goal: Patient/family/caregiver demonstrates understanding of disease process, treatment plan, medications, and discharge instructions  Description: Complete learning assessment and assess knowledge base.  Interventions:  - Provide teaching at level of understanding  - Provide teaching via preferred learning methods  Outcome: Progressing     Problem: NEUROSENSORY - ADULT  Goal: Achieves stable or improved neurological status  Description: INTERVENTIONS  - Monitor and report changes in neurological status  - Monitor vital signs such as temperature, blood pressure, glucose, and any other labs ordered   - Initiate measures to prevent increased intracranial pressure  - Monitor  for seizure activity and implement precautions if appropriate      Outcome: Progressing  Goal: Remains free of injury related to seizures activity  Description: INTERVENTIONS  - Maintain airway, patient safety  and administer oxygen as ordered  - Monitor patient for seizure activity, document and report duration and description of seizure to physician/advanced practitioner  - If seizure occurs,  ensure patient safety during seizure  - Reorient patient post seizure  - Seizure pads on all 4 side rails  - Instruct patient/family to notify RN of any seizure activity including if an aura is experienced  - Instruct patient/family to call for assistance with activity based on nursing assessment  - Administer anti-seizure medications if ordered    Outcome: Progressing  Goal: Achieves maximal functionality and self care  Description: INTERVENTIONS  - Monitor swallowing and airway patency with patient fatigue and changes in neurological status  - Encourage and assist patient to increase activity and self care.   - Encourage visually impaired, hearing impaired and aphasic patients to use assistive/communication devices  Outcome: Progressing

## 2024-01-29 ENCOUNTER — ANESTHESIA (INPATIENT)
Dept: RADIOLOGY | Facility: HOSPITAL | Age: 64
DRG: 025 | End: 2024-01-29
Payer: COMMERCIAL

## 2024-01-29 ENCOUNTER — ANESTHESIA EVENT (INPATIENT)
Dept: PERIOP | Facility: HOSPITAL | Age: 64
DRG: 025 | End: 2024-01-29
Payer: COMMERCIAL

## 2024-01-29 ENCOUNTER — ANESTHESIA EVENT (INPATIENT)
Dept: RADIOLOGY | Facility: HOSPITAL | Age: 64
DRG: 025 | End: 2024-01-29
Payer: COMMERCIAL

## 2024-01-29 ENCOUNTER — APPOINTMENT (INPATIENT)
Dept: RADIOLOGY | Facility: HOSPITAL | Age: 64
DRG: 025 | End: 2024-01-29
Attending: NEUROLOGICAL SURGERY
Payer: COMMERCIAL

## 2024-01-29 LAB
ANION GAP SERPL CALCULATED.3IONS-SCNC: 10 MMOL/L
APTT PPP: 28 SECONDS (ref 23–37)
BASOPHILS # BLD MANUAL: 0 THOUSAND/UL (ref 0–0.1)
BASOPHILS NFR MAR MANUAL: 0 % (ref 0–1)
BUN SERPL-MCNC: 18 MG/DL (ref 5–25)
CALCIUM SERPL-MCNC: 9.1 MG/DL (ref 8.4–10.2)
CHLORIDE SERPL-SCNC: 103 MMOL/L (ref 96–108)
CO2 SERPL-SCNC: 25 MMOL/L (ref 21–32)
CREAT SERPL-MCNC: 0.53 MG/DL (ref 0.6–1.3)
EOSINOPHIL # BLD MANUAL: 0 THOUSAND/UL (ref 0–0.4)
EOSINOPHIL NFR BLD MANUAL: 0 % (ref 0–6)
ERYTHROCYTE [DISTWIDTH] IN BLOOD BY AUTOMATED COUNT: 12.4 % (ref 11.6–15.1)
GFR SERPL CREATININE-BSD FRML MDRD: 102 ML/MIN/1.73SQ M
GLUCOSE SERPL-MCNC: 104 MG/DL (ref 65–140)
HCT VFR BLD AUTO: 42 % (ref 34.8–46.1)
HGB BLD-MCNC: 13.9 G/DL (ref 11.5–15.4)
INR PPP: 1.1 (ref 0.84–1.19)
LYMPHOCYTES # BLD AUTO: 1.11 THOUSAND/UL (ref 0.6–4.47)
LYMPHOCYTES # BLD AUTO: 11 % (ref 14–44)
MCH RBC QN AUTO: 29.7 PG (ref 26.8–34.3)
MCHC RBC AUTO-ENTMCNC: 33.1 G/DL (ref 31.4–37.4)
MCV RBC AUTO: 90 FL (ref 82–98)
MONOCYTES # BLD AUTO: 0.61 THOUSAND/UL (ref 0–1.22)
MONOCYTES NFR BLD: 6 % (ref 4–12)
NEUTROPHILS # BLD MANUAL: 8.38 THOUSAND/UL (ref 1.85–7.62)
NEUTS BAND NFR BLD MANUAL: 1 % (ref 0–8)
NEUTS SEG NFR BLD AUTO: 82 % (ref 43–75)
PLATELET # BLD AUTO: 207 THOUSANDS/UL (ref 149–390)
PLATELET BLD QL SMEAR: ADEQUATE
PMV BLD AUTO: 10.4 FL (ref 8.9–12.7)
POTASSIUM SERPL-SCNC: 4 MMOL/L (ref 3.5–5.3)
PROTHROMBIN TIME: 14.1 SECONDS (ref 11.6–14.5)
RBC # BLD AUTO: 4.68 MILLION/UL (ref 3.81–5.12)
RBC MORPH BLD: NORMAL
SODIUM SERPL-SCNC: 138 MMOL/L (ref 135–147)
WBC # BLD AUTO: 10.1 THOUSAND/UL (ref 4.31–10.16)

## 2024-01-29 PROCEDURE — 61624 TCAT PERM OCCLS/EMBOLJ CNS: CPT | Performed by: NEUROLOGICAL SURGERY

## 2024-01-29 PROCEDURE — 85610 PROTHROMBIN TIME: CPT | Performed by: INTERNAL MEDICINE

## 2024-01-29 PROCEDURE — C1887 CATHETER, GUIDING: HCPCS

## 2024-01-29 PROCEDURE — 03LG3BZ OCCLUSION OF INTRACRANIAL ARTERY WITH BIOACTIVE INTRALUMINAL DEVICE, PERCUTANEOUS APPROACH: ICD-10-PCS | Performed by: NEUROLOGICAL SURGERY

## 2024-01-29 PROCEDURE — 36223 PLACE CATH CAROTID/INOM ART: CPT

## 2024-01-29 PROCEDURE — 75894 X-RAYS TRANSCATH THERAPY: CPT | Performed by: NEUROLOGICAL SURGERY

## 2024-01-29 PROCEDURE — 36224 PLACE CATH CAROTD ART: CPT | Performed by: NEUROLOGICAL SURGERY

## 2024-01-29 PROCEDURE — 36228 PLACE CATH INTRACRANIAL ART: CPT | Performed by: NEUROLOGICAL SURGERY

## 2024-01-29 PROCEDURE — 80048 BASIC METABOLIC PNL TOTAL CA: CPT | Performed by: INTERNAL MEDICINE

## 2024-01-29 PROCEDURE — 36227 PLACE CATH XTRNL CAROTID: CPT | Performed by: NEUROLOGICAL SURGERY

## 2024-01-29 PROCEDURE — 37242 VASC EMBOLIZE/OCCLUDE ARTERY: CPT

## 2024-01-29 PROCEDURE — 36224 PLACE CATH CAROTD ART: CPT

## 2024-01-29 PROCEDURE — 85007 BL SMEAR W/DIFF WBC COUNT: CPT | Performed by: INTERNAL MEDICINE

## 2024-01-29 PROCEDURE — 85027 COMPLETE CBC AUTOMATED: CPT | Performed by: INTERNAL MEDICINE

## 2024-01-29 PROCEDURE — 76937 US GUIDE VASCULAR ACCESS: CPT

## 2024-01-29 PROCEDURE — C1769 GUIDE WIRE: HCPCS

## 2024-01-29 PROCEDURE — 85730 THROMBOPLASTIN TIME PARTIAL: CPT | Performed by: INTERNAL MEDICINE

## 2024-01-29 PROCEDURE — 99223 1ST HOSP IP/OBS HIGH 75: CPT | Performed by: INTERNAL MEDICINE

## 2024-01-29 PROCEDURE — C1894 INTRO/SHEATH, NON-LASER: HCPCS

## 2024-01-29 PROCEDURE — 99232 SBSQ HOSP IP/OBS MODERATE 35: CPT | Performed by: INTERNAL MEDICINE

## 2024-01-29 PROCEDURE — 36227 PLACE CATH XTRNL CAROTID: CPT

## 2024-01-29 PROCEDURE — 75898 FOLLOW-UP ANGIOGRAPHY: CPT | Performed by: NEUROLOGICAL SURGERY

## 2024-01-29 PROCEDURE — 75894 X-RAYS TRANSCATH THERAPY: CPT

## 2024-01-29 RX ORDER — HYDROMORPHONE HCL/PF 1 MG/ML
0.5 SYRINGE (ML) INJECTION
Status: DISCONTINUED | OUTPATIENT
Start: 2024-01-29 | End: 2024-01-29 | Stop reason: HOSPADM

## 2024-01-29 RX ORDER — ALBUTEROL SULFATE 2.5 MG/3ML
2.5 SOLUTION RESPIRATORY (INHALATION) ONCE AS NEEDED
Status: DISCONTINUED | OUTPATIENT
Start: 2024-01-29 | End: 2024-01-29 | Stop reason: HOSPADM

## 2024-01-29 RX ORDER — SODIUM CHLORIDE 9 MG/ML
100 INJECTION, SOLUTION INTRAVENOUS CONTINUOUS
Status: DISCONTINUED | OUTPATIENT
Start: 2024-01-30 | End: 2024-01-29

## 2024-01-29 RX ORDER — IODIXANOL 320 MG/ML
50 INJECTION, SOLUTION INTRAVASCULAR
Status: COMPLETED | OUTPATIENT
Start: 2024-01-29 | End: 2024-01-29

## 2024-01-29 RX ORDER — VERAPAMIL HYDROCHLORIDE 2.5 MG/ML
INJECTION, SOLUTION INTRAVENOUS AS NEEDED
Status: COMPLETED | OUTPATIENT
Start: 2024-01-29 | End: 2024-01-29

## 2024-01-29 RX ORDER — FENTANYL CITRATE 50 UG/ML
INJECTION, SOLUTION INTRAMUSCULAR; INTRAVENOUS AS NEEDED
Status: DISCONTINUED | OUTPATIENT
Start: 2024-01-29 | End: 2024-01-29

## 2024-01-29 RX ORDER — ONDANSETRON 2 MG/ML
4 INJECTION INTRAMUSCULAR; INTRAVENOUS ONCE AS NEEDED
Status: DISCONTINUED | OUTPATIENT
Start: 2024-01-29 | End: 2024-01-29 | Stop reason: HOSPADM

## 2024-01-29 RX ORDER — SODIUM CHLORIDE 9 MG/ML
75 INJECTION, SOLUTION INTRAVENOUS CONTINUOUS
Status: DISCONTINUED | OUTPATIENT
Start: 2024-01-29 | End: 2024-01-29

## 2024-01-29 RX ORDER — LIDOCAINE HYDROCHLORIDE 10 MG/ML
INJECTION, SOLUTION EPIDURAL; INFILTRATION; INTRACAUDAL; PERINEURAL AS NEEDED
Status: COMPLETED | OUTPATIENT
Start: 2024-01-29 | End: 2024-01-29

## 2024-01-29 RX ORDER — FENTANYL CITRATE/PF 50 MCG/ML
25 SYRINGE (ML) INJECTION
Status: DISCONTINUED | OUTPATIENT
Start: 2024-01-29 | End: 2024-01-29 | Stop reason: HOSPADM

## 2024-01-29 RX ORDER — NITROGLYCERIN 20 MG/100ML
INJECTION INTRAVENOUS AS NEEDED
Status: COMPLETED | OUTPATIENT
Start: 2024-01-29 | End: 2024-01-29

## 2024-01-29 RX ORDER — HEPARIN SODIUM 1000 [USP'U]/ML
INJECTION, SOLUTION INTRAVENOUS; SUBCUTANEOUS AS NEEDED
Status: COMPLETED | OUTPATIENT
Start: 2024-01-29 | End: 2024-01-29

## 2024-01-29 RX ORDER — HYDRALAZINE HYDROCHLORIDE 20 MG/ML
10 INJECTION INTRAMUSCULAR; INTRAVENOUS EVERY 6 HOURS PRN
Status: DISCONTINUED | OUTPATIENT
Start: 2024-01-29 | End: 2024-01-30

## 2024-01-29 RX ORDER — MIDAZOLAM HYDROCHLORIDE 2 MG/2ML
INJECTION, SOLUTION INTRAMUSCULAR; INTRAVENOUS AS NEEDED
Status: DISCONTINUED | OUTPATIENT
Start: 2024-01-29 | End: 2024-01-29

## 2024-01-29 RX ADMIN — DEXAMETHASONE 4 MG: 4 TABLET ORAL at 11:10

## 2024-01-29 RX ADMIN — FENTANYL CITRATE 25 MCG: 50 INJECTION INTRAMUSCULAR; INTRAVENOUS at 13:15

## 2024-01-29 RX ADMIN — DEXAMETHASONE 4 MG: 4 TABLET ORAL at 17:54

## 2024-01-29 RX ADMIN — FLUOXETINE HYDROCHLORIDE 20 MG: 20 CAPSULE ORAL at 08:03

## 2024-01-29 RX ADMIN — HYDRALAZINE HYDROCHLORIDE 10 MG: 20 INJECTION, SOLUTION INTRAMUSCULAR; INTRAVENOUS at 18:05

## 2024-01-29 RX ADMIN — SODIUM CHLORIDE, SODIUM LACTATE, POTASSIUM CHLORIDE, AND CALCIUM CHLORIDE 125 ML/HR: .6; .31; .03; .02 INJECTION, SOLUTION INTRAVENOUS at 08:07

## 2024-01-29 RX ADMIN — Medication 600 MCG: at 12:40

## 2024-01-29 RX ADMIN — FENTANYL CITRATE 25 MCG: 50 INJECTION INTRAMUSCULAR; INTRAVENOUS at 13:04

## 2024-01-29 RX ADMIN — DEXAMETHASONE 4 MG: 4 TABLET ORAL at 23:51

## 2024-01-29 RX ADMIN — LEVOTHYROXINE SODIUM 75 MCG: 75 TABLET ORAL at 04:58

## 2024-01-29 RX ADMIN — FENTANYL CITRATE 25 MCG: 50 INJECTION INTRAMUSCULAR; INTRAVENOUS at 12:32

## 2024-01-29 RX ADMIN — DEXAMETHASONE 4 MG: 4 TABLET ORAL at 00:14

## 2024-01-29 RX ADMIN — IODIXANOL 35 ML: 320 INJECTION, SOLUTION INTRAVASCULAR at 13:24

## 2024-01-29 RX ADMIN — Medication 400 MCG: at 12:42

## 2024-01-29 RX ADMIN — LIDOCAINE HYDROCHLORIDE 5 ML: 10 INJECTION, SOLUTION EPIDURAL; INFILTRATION; INTRACAUDAL; PERINEURAL at 12:40

## 2024-01-29 RX ADMIN — DOCUSATE SODIUM 100 MG: 100 CAPSULE, LIQUID FILLED ORAL at 17:55

## 2024-01-29 RX ADMIN — LEVETIRACETAM 750 MG: 750 TABLET, FILM COATED ORAL at 20:36

## 2024-01-29 RX ADMIN — HEPARIN SODIUM 4000 UNITS: 1000 INJECTION INTRAVENOUS; SUBCUTANEOUS at 12:42

## 2024-01-29 RX ADMIN — CHLORHEXIDINE GLUCONATE 15 ML: 1.2 SOLUTION ORAL at 00:14

## 2024-01-29 RX ADMIN — FENTANYL CITRATE 25 MCG: 50 INJECTION INTRAMUSCULAR; INTRAVENOUS at 12:41

## 2024-01-29 RX ADMIN — MIDAZOLAM 0.5 MG: 1 INJECTION INTRAMUSCULAR; INTRAVENOUS at 12:32

## 2024-01-29 RX ADMIN — SODIUM CHLORIDE: 0.9 INJECTION, SOLUTION INTRAVENOUS at 12:27

## 2024-01-29 RX ADMIN — PANTOPRAZOLE SODIUM 40 MG: 40 TABLET, DELAYED RELEASE ORAL at 04:58

## 2024-01-29 RX ADMIN — SODIUM CHLORIDE, SODIUM LACTATE, POTASSIUM CHLORIDE, AND CALCIUM CHLORIDE 125 ML/HR: .6; .31; .03; .02 INJECTION, SOLUTION INTRAVENOUS at 00:15

## 2024-01-29 RX ADMIN — DEXAMETHASONE 4 MG: 4 TABLET ORAL at 04:57

## 2024-01-29 RX ADMIN — LEVETIRACETAM 750 MG: 750 TABLET, FILM COATED ORAL at 08:03

## 2024-01-29 RX ADMIN — VERAPAMIL HYDROCHLORIDE 5 MG: 2.5 INJECTION INTRAVENOUS at 12:41

## 2024-01-29 NOTE — PROGRESS NOTES
[facility]  Progress Note  Name: Mary Ashby I  MRN: [id number]  Unit/Bed#: [facility] 726-01 I Date of Admission: 1/24/2024   Date of Service: 1/29/2024 I Hospital Day: 5    Assessment/Plan   * Meningioma (HCC)  Assessment & Plan  4.3 cm meningioma w/ vasogenic edema and midline shift present on outpatient MRI  Evaluated by neurosurgery  Patient was started on Decadron  to help with edema  Started on Keppra for seizure prophylaxis  PPI was added for gastric protection  Aspirin on hold  Plan for cerebral angiogram yoday and surgical tumor resection on Tuesday  Continue with neurochecks  Neurosurgery is following    Abnormal CT scan, lumbar spine  Assessment & Plan  Multiple ill-defined foci of enhancement in the lumbar paraspinal musculature seen on CT scan    Lumbar spine MRI with paraspinal intramuscular masses. Imaging morphology most compatible with hemangiomas. Myxoma, sarcoma and metastases are considered less likely     Outpatient follow-up    Hepatic lesion  Assessment & Plan  Multiple hypoattenuating hepatic lesions seen on CT scan  Abdominal MRI consistent with tiny hepatic cysts, considered benign findings     Outpatient follow-up    Lung nodules  Assessment & Plan  CT scan with multiple bilateral pulmonary nodules measuring 4 mm   Recommendation for follow-up chest CT in 3 months to exclude metastatic disease.    Gallbladder polyp  Assessment & Plan  Abdominal MRI with Findings most compatible with gallbladder cholesterolosis/tiny polyps, typically benign.   Recommend confirmation with right upper quadrant ultrasound.   Outpatient workup    Blood transfusion declined because patient is [employer]  Assessment & Plan  Hematology consulted  Stable H&H  Continue to monitor    Anxiety  Assessment & Plan  Continue Prozac  Patient reported that she is the primary caregiver for her parents and her  and she is under a lot of stress at  home    Hypothyroidism  Assessment & Plan  Continue levothyroxine  Normal TSH             VTE Pharmacologic Prophylaxis: VTE Score: 3 patient is ambulating and prefers not to use subcu heparin    Mobility:   Basic Mobility Inpatient Raw Score: 24  JH-HLM Goal: 8: Walk 250 feet or more  JH-HLM Achieved: 8: Walk 250 feet ot more  HLM Goal achieved. Continue to encourage appropriate mobility.    Patient Centered Rounds: I performed bedside rounds with nursing staff today.   Discussions with Specialists or Other Care Team Provider: AWA    Education and Discussions with Family / Patient:  Patient.     Total Time Spent on Date of Encounter in care of patient: 35 mins. This time was spent on one or more of the following: performing physical exam; counseling and coordination of care; obtaining or reviewing history; documenting in the medical record; reviewing/ordering tests, medications or procedures; communicating with other healthcare professionals and discussing with patient's family/caregivers.    Current Length of Stay: 5 day(s)  Current Patient Status: Inpatient   Certification Statement: The patient will continue to require additional inpatient hospital stay due to treatment of brain meningioma  Discharge Plan: Anticipate discharge in >72 hrs to home.    Code Status: Level 1 - Full Code    Subjective:   Patient seen and examined  Comfortable in bed  No event overnight  N.p.o. for cerebral angiogram today    Objective:     Vitals:   Temp (24hrs), Av °F (36.7 °C), Min:97.6 °F (36.4 °C), Max:98.5 °F (36.9 °C)    Temp:  [97.6 °F (36.4 °C)-98.5 °F (36.9 °C)] 98.2 °F (36.8 °C)  HR:  [58-67] 64  Resp:  [16-18] 16  BP: (147-157)/(72-87) 147/87  SpO2:  [95 %-98 %] 96 %  Body mass index is 25.24 kg/m².     Input and Output Summary (last 24 hours):   No intake or output data in the 24 hours ending 24 1052    Physical Exam:   Physical Exam   Patient is awake alert oriented no acute distress  Pleasant comfortable in  bed  Lungs clear to auscultation bilateral  Heart positive S1-S2 no murmur  Abdomen soft nontender  Lower extremities no edema    Additional Data:     Labs:  Results from last 7 days   Lab Units 01/29/24  0454 01/25/24  0603 01/23/24  1519   WBC Thousand/uL 10.10   < > 4.78   HEMOGLOBIN g/dL 13.9   < > 14.2   HEMATOCRIT % 42.0   < > 42.3   PLATELETS Thousands/uL 207   < > 152   NEUTROS PCT %  --   --  64   LYMPHS PCT %  --   --  26   MONOS PCT %  --   --  8   EOS PCT %  --   --  1    < > = values in this interval not displayed.     Results from last 7 days   Lab Units 01/29/24  0454 01/25/24  0603   SODIUM mmol/L 138 140   POTASSIUM mmol/L 4.0 4.0   CHLORIDE mmol/L 103 104   CO2 mmol/L 25 27   BUN mg/dL 18 15   CREATININE mg/dL 0.53* 0.53*   ANION GAP mmol/L 10 9   CALCIUM mg/dL 9.1 9.5   ALBUMIN g/dL  --  4.4   TOTAL BILIRUBIN mg/dL  --  0.73   ALK PHOS U/L  --  65   ALT U/L  --  22   AST U/L  --  19   GLUCOSE RANDOM mg/dL 104 129     Results from last 7 days   Lab Units 01/29/24  0454   INR  1.10                   Lines/Drains:  Invasive Devices       Peripheral Intravenous Line  Duration             Peripheral IV 01/28/24 Dorsal (posterior);Right Hand 1 day                          Imaging: No pertinent imaging reviewed.    Recent Cultures (last 7 days):         Last 24 Hours Medication List:   Current Facility-Administered Medications   Medication Dose Route Frequency Provider Last Rate    acetaminophen  650 mg Oral Q6H PRN Nighat Mejias MD      dexamethasone  4 mg Oral Q6H KEELY Kang PA-C      docusate sodium  100 mg Oral BID Nighat Mejias MD      FLUoxetine  20 mg Oral Daily Nighat Mejias MD      lactated ringers  125 mL/hr Intravenous Continuous Kyle Valiente PA-C 125 mL/hr (01/29/24 0807)    levETIRAcetam  750 mg Oral Q12H KEELY Kang PA-C      levothyroxine  75 mcg Oral Early Morning Nighat Mejias MD      pantoprazole  40 mg Oral Early Morning Joseph Borges DO      [START ON 1/30/2024]  sodium chloride  100 mL/hr Intravenous Continuous Ginette Chavez MD          Today, Patient Was Seen By: Joseph Borges DO    **Please Note: This note may have been constructed using a voice recognition system.**

## 2024-01-29 NOTE — CASE MANAGEMENT
Case Management Discharge Planning Note    Patient name Mary Ashby  Location [facility] 726/[facility] 726-01 MRN [id number]  : 1960 Date 2024       Current Admission Date: 2024  Current Admission Diagnosis:Meningioma (HCC)   Patient Active Problem List    Diagnosis Date Noted    Gallbladder polyp 2024    Lung nodules 2024    Hepatic lesion 2024    Abnormal CT scan, lumbar spine 2024    Blood transfusion declined because patient is [employer] 2024    Leukopenia 2024    Meningioma (HCC) 2024    Anxiety 2023    Encounter for gynecological examination (general) (routine) without abnormal findings 2022    Lipid screening 2018    Need for hepatitis C screening test 2018    Hypothyroidism 2016      LOS (days): 5  Geometric Mean LOS (GMLOS) (days): 4  Days to GMLOS:-1.1     OBJECTIVE:  Risk of Unplanned Readmission Score: 7.16         Current admission status: Inpatient   Preferred Pharmacy:   [facility] [place] [place] - [address]  [address]  [address]  [facility] [address]  Phone: [phone] Fax: [phone]    Primary Care Provider: Sinan Palacios PA-C    Primary Insurance: [employer].  Secondary Insurance:     DISCHARGE DETAILS:        Additional Comments: Pt will remain IP. Angiogram scheduled for today, meningioma resection scheduled for tomorrow.

## 2024-01-29 NOTE — PLAN OF CARE
Problem: PAIN - ADULT  Goal: Verbalizes/displays adequate comfort level or baseline comfort level  Description: Interventions:  - Encourage patient to monitor pain and request assistance  - Assess pain using appropriate pain scale  - Administer analgesics based on type and severity of pain and evaluate response  - Implement non-pharmacological measures as appropriate and evaluate response  - Consider cultural and social influences on pain and pain management  - Notify physician/advanced practitioner if interventions unsuccessful or patient reports new pain  Outcome: Progressing     Problem: SAFETY ADULT  Goal: Patient will remain free of falls  Description: INTERVENTIONS:  - Educate patient/family on patient safety including physical limitations  - Instruct patient to call for assistance with activity   - Consult OT/PT to assist with strengthening/mobility   - Keep Call bell within reach  - Keep bed low and locked with side rails adjusted as appropriate  - Keep care items and personal belongings within reach  - Initiate and maintain comfort rounds  - Make Fall Risk Sign visible to staff  - Offer Toileting every  Hours, in advance of need  - Initiate/Maintain alarm  - Obtain necessary fall risk management equipment:   - Apply yellow socks and bracelet for high fall risk patients  - Consider moving patient to room near nurses station  Outcome: Progressing  Goal: Maintain or return to baseline ADL function  Description: INTERVENTIONS:  -  Assess patient's ability to carry out ADLs; assess patient's baseline for ADL function and identify physical deficits which impact ability to perform ADLs (bathing, care of mouth/teeth, toileting, grooming, dressing, etc.)  - Assess/evaluate cause of self-care deficits   - Assess range of motion  - Assess patient's mobility; develop plan if impaired  - Assess patient's need for assistive devices and provide as appropriate  - Encourage maximum independence but intervene and supervise  when necessary  - Involve family in performance of ADLs  - Assess for home care needs following discharge   - Consider OT consult to assist with ADL evaluation and planning for discharge  - Provide patient education as appropriate  Outcome: Progressing  Goal: Maintains/Returns to pre admission functional level  Description: INTERVENTIONS:  - Perform AM-PAC 6 Click Basic Mobility/ Daily Activity assessment daily.  - Set and communicate daily mobility goal to care team and patient/family/caregiver.   - Collaborate with rehabilitation services on mobility goals if consulted  - Perform Range of Motion  times a day.  - Reposition patient every  hours.  - Dangle patient  times a day  - Stand patient  times a day  - Ambulate patient  times a day  - Out of bed to chair  times a day   - Out of bed for meals  times a day  - Out of bed for toileting  - Record patient progress and toleration of activity level   Outcome: Progressing     Problem: DISCHARGE PLANNING  Goal: Discharge to home or other facility with appropriate resources  Description: INTERVENTIONS:  - Identify barriers to discharge w/patient and caregiver  - Arrange for needed discharge resources and transportation as appropriate  - Identify discharge learning needs (meds, wound care, etc.)  - Arrange for interpretive services to assist at discharge as needed  - Refer to Case Management Department for coordinating discharge planning if the patient needs post-hospital services based on physician/advanced practitioner order or complex needs related to functional status, cognitive ability, or social support system  Outcome: Progressing     Problem: Knowledge Deficit  Goal: Patient/family/caregiver demonstrates understanding of disease process, treatment plan, medications, and discharge instructions  Description: Complete learning assessment and assess knowledge base.  Interventions:  - Provide teaching at level of understanding  - Provide teaching via preferred  learning methods  Outcome: Progressing     Problem: NEUROSENSORY - ADULT  Goal: Achieves stable or improved neurological status  Description: INTERVENTIONS  - Monitor and report changes in neurological status  - Monitor vital signs such as temperature, blood pressure, glucose, and any other labs ordered   - Initiate measures to prevent increased intracranial pressure  - Monitor for seizure activity and implement precautions if appropriate      Outcome: Progressing  Goal: Remains free of injury related to seizures activity  Description: INTERVENTIONS  - Maintain airway, patient safety  and administer oxygen as ordered  - Monitor patient for seizure activity, document and report duration and description of seizure to physician/advanced practitioner  - If seizure occurs,  ensure patient safety during seizure  - Reorient patient post seizure  - Seizure pads on all 4 side rails  - Instruct patient/family to notify RN of any seizure activity including if an aura is experienced  - Instruct patient/family to call for assistance with activity based on nursing assessment  - Administer anti-seizure medications if ordered    Outcome: Progressing  Goal: Achieves maximal functionality and self care  Description: INTERVENTIONS  - Monitor swallowing and airway patency with patient fatigue and changes in neurological status  - Encourage and assist patient to increase activity and self care.   - Encourage visually impaired, hearing impaired and aphasic patients to use assistive/communication devices  Outcome: Progressing

## 2024-01-29 NOTE — DISCHARGE INSTRUCTIONS
Today, you underwent a diagnostic cerebral angiogram under the care of Dr. Lowery for evaluation of a meningioma embolization.   ?  The following instructions will help you care for yourself, or be cared for upon your return home today. These are guidelines for your care right after your surgery only.   ?  Notify Your Doctor or Nurse if you have any of the following:  ?  SYMPTOMS OF WOUND INFECTION--   Increased pain in or around the incision   Swelling around the incision  Any drainage from the incision  Incision separates or opens up  Warmth in the tissues around the incision  Redness or tenderness on the skin near the incision   Fever (temperature greater than 101 degrees F)   ?  NEUROLOGICAL CHANGES--  Change in alertness  Increased sleepiness   Nausea and vomiting   New onset of numbness or weakness in arms or legs   New problems with your bowels or bladder  New or worse problems with balance or walking  Seizures, new or worsening  ?  UNRELIEVED HEADACHE PAIN--  New or increased pain unrelieved with pain medications   Pain associated with nausea and vomiting   Pain associated with other symptoms  ?  QUESTIONS OR PROBLEMS--  Any questions or problems that you are unsure about  Wound Care:  Keep Incision Clean and Dry   You may shower daily, but do not soak incision. Pat dry after showering.   No tub baths, soaking, swimming for 1 week after angiogram.   You do not need to cover the incision. Mild to moderate bruising and tenderness to the site is expected and may last up to 1-2 weeks after your procedure.   ?  A closure device was placed at the catheter insertion site. This is MRI compatible. Remove the dressing 24 hours after your procedure.   If your groin site is bleeding, apply firm pressure for 10 minutes. Reinforce dressing rather than removing and checking frequently. If continues to bleed through the dressing after 1 hour, contact your neurosurgeon's office.   Anticipatory Education:  ?  PAIN MED W/  Acetaminophen (Tylenol)  --IF a prescription for pain medicine has been sent home with you:  --Narcotic pain medication may cause constipation. Be sure to take stool softeners or laxatives while you are on narcotic pain medication.   --Do not drive after taking prescription pain medicine.   ?  If this medicine is too strong, or no longer necessary, or we did NOT recommend/prescribe oral narcotics, you may take:   - Tylenol Extra-strength/Acetaminophen, 2 tablets every 4-6 hours as needed for mild pain. DO NOT TAKE MORE THAN 4000MG PER DAY from combined sources. NOTE: Remember to eat when taking pain medicines in order to avoid nausea. Watch for constipation. Eat plenty of fruits, vegetables, juices, and drink 6-8 glasses of water each day.   Constipation: Stay active and drink at least 6-8 cups of fluid each day to prevent constipation. If you need a laxative or stool softener follow the package directions or consult with your local pharmacists if you have questions.  ?  After anesthesia, rest for 24 hours. Do not drive, drink alcohol beverages or make any important decisions during this time. General anesthesia may cause sore throat, jaw discomfort or muscle aches. These symptoms can last for one or two days.  Activity: Please follow these instructions:  Advance your activity as you can tolerate. You may do light house work; nothing strenuous   You may walk all you want. You may go up and down the steps. Use the railing for support  Do not do excessive bending, straining or heavy lifting for 48 hours after your procedure  Do not drive or return to work until you are instructed   It is normal for your energy level and sleep patterns to change after surgery.   Get extra sleep at night and take naps during the day to help you feel less tired.   Take rest periods during the day.   Complete recovery may take several weeks.  ?  You may resume driving after 24-48 hours recovery.   You may return to work after 48 hours of  recovery.   ?  Diet:  Your doctor has recommended that you follow these diet instructions at home. Refer to the patient education materials you received during your hospital stay. If you would like more nutrition counseling, ask your doctor about making an appointment with an outpatient dietitian.  Resume your home diet  ?  Medications:  Please resume your home medications as instructed.   ?  Home Supplies and Equipment:  none  Additional Contacts:  ?  CONTACTS FOR NEUROSURGERY: You may call your neurosurgeon’s office if you have questions between 8:30 am and 4:30 pm. You may request to speak to the nurse practitioner who is available Monday through Friday.   ?  For off hours or the weekend you may call your neurosurgeon's office to leave a message.

## 2024-01-29 NOTE — OP NOTE
Preop Diagnosis:  1.  Embolization of left sphenoid wing tumor, meningioma     Postop Diagnosis  1. Embolization of left sphenoid wing tumor, meningioma     Procedure:  Left Internal Carotid Arteriogram  Left External Carotid Arteriogram  Selective left middle meningeal artery arteriogram  PVA embolization of left middle meningeal artery for intracranial mass  Intraprocedural and postprocedural arteriography of left middle meningeal artery  Limited Right Femoral Arteriogram     Surgeon:   Placido Lowery MD     Specimen(s):  None     Estimated Blood Loss:   None     Drains:  None     Anesthesia Type:   Monitored Anesthesia Care      Complications:  None     Operative Indications:  Mary Ashby is a very pleasant 63 y.o. female  who presented with speech difficulties and was found to have a large left sphenoid wing mass.  After discussing risks benefits of embolization followed by craniotomy and resection of mass she elected to proceed.   The patient understood the risks included bleeding, stroke, vessel injury, and kidney injury.  He elected to proceed.        Procedure Details:  After obtaining written informed consent, the patient was brought into the operating room and moved to the OR table in supine fashion.      Monitored anesthesia care was induced.     Right radial artery was prepared in the usual sterile fashion.  A surgical time-out was performed.  6 cc of a combination of 1% lidocaine and 600 mcg of nitroglycerin were infiltrated along the right radial artery.  Ultrasound guidance under real-time visualization was used to guide the micro puncture needle into the right radial artery. Next, a 5 Kiswahili tapered sheath was then placed.   Next a cocktail of 4000 units of heparin, 5 mg of verapamil, and 400 mg of nitroglycerin were slowly infused into the right radial artery.  A right radial arteriogram was then performed.  Next a Citrus selector catheter was advanced over the arch.     The catheter was then  advanced and the left internal carotid artery was then catheterized. AP lateral and magnified oblique images of the left intracranial carotid circulation were obtained.    The catheter was withdrawn and the left external carotid artery was catheterized.  AP and lateral arteriogram was then performed.  An WhoisEDIon 10 microcatheter was then advanced through the diagnostic catheter over a Traxcess microwire.  The left middle meningeal artery was catheterized.  Selective microcatheter arteriogram of the left middle meningeal artery was then completed.     After ensuring no intracranial anastomosis I proceeded with left middle meningeal artery embolization using 150-250 micro on PVA particles.  Intraprocedural and postprocedural microcatheter arteriograms were performed.     After completing my embolization a final left external carotid artery arteriogram was performed.  Next a left common carotid arteriogram was performed.     The catheters were then withdrawn from the body. Puncture site was hemostasis was obtained with a TR band. There was appropriate hemostasis. The patient was then awoken from general anesthesia and found to be in baseline neurologic condition. All sponge and needle counts were correct.         INTERPRETATION OF ANGIOGRAPHIC FINDINGS:   1. The left internal carotid artery circulation reveals antegrade flow into the middle cerebral and anterior cerebral arteries.  There is clear displacement of the middle cerebral artery superiorly consistent with her known sphenoid wing mass.  Does not appear to be a large intracranial/Pial contribution to his blood supply.  There is no evidence of middle meningeal artery filling.  The capillary and venous phases are unremarkable.      2. The left external carotid artery has antegrade flow into the extracranial vessels.  There is no evidence of extracranial intracranial anastomosis.  There is a large middle meningeal artery with supplies.  With evidence of an  intra-axial mass.  No evidence of anastomosis with the internal carotid circulation.      3. Selective microcatheter arteriogram of the left middle meningeal artery once again reveals selective flow into the middle meningeal artery.  Tumor blush consistent with known mass.  No evidence of internal carotid anastomosis.       4. Intraprocedural arteriography reveals progressive embolization of the left middle meningeal artery     5.  Selective microcatheter arteriogram after embolization reveals stagnation in the middle meningeal artery with no distal penetration.     6. Postprocedural left external carotid artery arteriogram reveals no significant filling of the middle meningeal circulation.  Left common carotid arteriogram reveals antegrade flow without any evidence of distal embolism.     Impression:  Successful embolization of left extra-axial mass    Patient Disposition:  Critical Care Unit     SIGNATURE: Placido Lowery MD  DATE: 01/29/24   TIME:1:36 PM

## 2024-01-29 NOTE — CONSULTS
[facility]  Consult: Critical Care  Name: Mary Ashby 63 y.o. female I MRN: [id number]  Unit/Bed#: PPHP 7 OVR I Date of Admission: 1/24/2024   Date of Service: 1/29/2024 I Hospital Day: 5    Consults    Assessment/Plan   Neuro:   Diagnosis: Left frontal meningioma, anterior left middle cranial fossa; likely focal-onset, retained awareness seizures given description of aphasia/apraxia episodes - patient reports these episodes stopped since starting Keppra  Patient presented with word finding difficulties intermittently for 5 years, increasing over the past month  MRI (1/23): 4.3 cm probable meningioma in the anterior aspect of the left middle cranial fossa with surrounding edema  Plan:   Neurosurgery on board, cerebral angio 1/29, plan for resection 1/30 with Dr. 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 CAM-ICU. Delirium precautions. Frequent re-orientation as able. Regulate sleep/wake cycle.   Pain control      CV:   Diagnosis: No critical issues  Plan: Continuous cardiopulmonary monitoring.  Maintain MAP > 65.  Monitor resuscitative endpoints.    Pulm:  Diagnosis: Lung nodules  CT scan with multiple bilateral pulmonary nodules measuring up to 4 mm  Plan: Follow-up CT chest in 3 months to rule out metastatic disease  Continuous pulse ox.  Pulmonary toilet.  Elevate HOB.  Maintain O2 sat greater than 92%  Supplemental O2: If needed    GI:   Diagnosis: Gallbladder polyp, hepatic lesions  CT demonstrating multiple hypoattenuating hepatic lesions-MRI findings most consistent with benign hepatic cysts  MRI demonstrating  gallbladder polyps, most consistent with a benign finding  Plan: Outpatient workup including RUQ US  Plan: Bowel regimen:  prn. Monitor stool output and quality.  GI prophylaxis: If indicated    :   No critical issues  Plan: Strict I/O. Monitor urine output and renal indices. Avoid nephrotoxins.        F/E/N:   F: Fluid resuscitation prn.  E: Monitor and replete electrolytes for ionized calcium >1.12, Mg >2, Phos >3, K >4.  N: Regular diet       Heme/Onc:   Diagnosis:,, Lung nodules, gallbladder polyp, paraspinal masses, meningioma  Plan: Outpatient follow-up  Stable H&H.  Monitor Hgb and transfuse for Hgb <7 or based on hemodynamics if actively bleeding. Monitor platelets.  VTE prophylaxis: pharm ppx when cleared by neurosurgery. Sequential compression devices and/or foot pumps.      Endo:   Diagnosis: Hypothyroidism, normal TSH  Plan: Continue levothyroxine  Monitor blood glucose for goal 140-180.       ID:   No critical issues  Monitor fever curve and WBC. Maintain normothermia. Daily CHG bath, Peridex oral rinse, and nasal antiseptic while in ICU.  Tylenol prn for fever management      MSK/Skin:   Frequent turning and repositioning. Pressure injury prevention. Monitor for skin breakdown. PT/OT when appropriate. Encourage OOBTC and ambulation when appropriate. Local wound care prn. Forced warm air blanket if needed for hypothermia.        Disposition: Critical care     History of Present Illness     HPI: Mary Ashby is a 63 y.o. who presents with progressive word-finding difficulty and apraxia over the last 5 years with particular worsening over the last month. Patient would often be in the middle of conversations and suddenly be unable to understand what people were syaing and be unable to easily produce speech as well. Episodes of apraxia w/ both speech and difficulty completing tasks (put a pill in her mouth and couldn't figure out what to do next).  Found to have meningioma in the left middle cranial  fossa. Episodes stopped after initiation of AED/keppra. Planned for angio/embo and subsequent resection with Dr. Lowery.  Patient presents to us from PACU after angio/embo without focal deficits    History obtained from chart review and the patient.  Review of Systems   Constitutional:  Negative for fatigue and fever.   HENT:  Negative for hearing loss, trouble swallowing and voice change.    Eyes:  Negative for visual disturbance.   Respiratory:  Negative for shortness of breath.    Cardiovascular:  Negative for chest pain and palpitations.   Gastrointestinal:  Negative for blood in stool, constipation, diarrhea, nausea and vomiting.   Neurological:  Negative for dizziness, weakness, light-headedness and headaches.     Historical Information   Past Medical History:  No date: Arthritis  No date: BRCA1 negative  No date: Disease of thyroid gland  1991: Endometrial cancer (HCC)  No date: Thyroid disease  No date: Uterus cancer (HCC) Past Surgical History:  No date: HYSTERECTOMY      Comment:  still has ovaries   Current Outpatient Medications   Medication Instructions    aspirin 81 mg, Oral, Daily    FLUoxetine (PROZAC) 20 mg, Oral, Daily    levothyroxine 75 mcg, Oral, Daily    No Known Allergies   Social History     Tobacco Use    Smoking status: Former     Types: Cigarettes    Smokeless tobacco: Never   Vaping Use    Vaping status: Never Used   Substance Use Topics    Alcohol use: Yes     Alcohol/week: 3.0 standard drinks of alcohol     Types: 3 Glasses of wine per week     Comment: social    Drug use: No    Family History   Problem Relation Age of Onset    Breast cancer Mother 50    No Known Problems Father     Cancer Sister     No Known Problems Daughter     No Known Problems Maternal Grandmother     No Known Problems Maternal Grandfather     No Known Problems Paternal Grandmother     No Known Problems Paternal Grandfather     Lymphoma Brother     Melanoma Brother 50    No Known Problems Son     Skin cancer  Maternal Aunt     Breast cancer Maternal Aunt     Colon cancer Paternal Uncle     Breast cancer Cousin           Objective                            Vitals I/O      Most Recent Min/Max in 24hrs   Temp 98.2 °F (36.8 °C) Temp  Min: 97.6 °F (36.4 °C)  Max: 98.5 °F (36.9 °C)   Pulse 64 Pulse  Min: 58  Max: 67   Resp 16 Resp  Min: 16  Max: 18   /87 BP  Min: 147/87  Max: 157/72   O2 Sat 96 % SpO2  Min: 95 %  Max: 98 %    No intake or output data in the 24 hours ending 01/29/24 1248    Diet NPO; Sips with meds    Invasive Monitoring           Physical Exam   Physical Exam  Eyes:      Extraocular Movements: Extraocular movements intact.      Pupils: Pupils are equal, round, and reactive to light.   HENT:      Head: Normocephalic.      Nose: No congestion or rhinorrhea.      Mouth/Throat:      Mouth: Mucous membranes are moist.      Pharynx: No oropharyngeal exudate.   Neck:      Vascular: No JVD.   Cardiovascular:      Rate and Rhythm: Normal rate and regular rhythm.   Musculoskeletal:      Right lower leg: No edema.      Left lower leg: No edema.   Abdominal: General: Bowel sounds are normal. There is no distension.      Palpations: Abdomen is soft.      Tenderness: There is no abdominal tenderness.   Constitutional:       General: She is not in acute distress.     Appearance: She is well-developed and well-nourished.   Pulmonary:      Effort: Pulmonary effort is normal. No respiratory distress.      Breath sounds: Normal breath sounds. No stridor. No wheezing or rhonchi.   Psychiatric:         Speech: Speech is not no expressive aphasia.   Neurological:      Mental Status: She is alert and oriented to person, place and time.      Cranial Nerves: No dysarthria or facial asymmetry.      Sensory: No sensory deficit.      Motor: Strength full and intact in all extremities.        No clonus.      Comments: B/l upgoing plantars (but w/d response concurrent)          Diagnostic Studies      EKG: None found  Imaging: MRI  LS, MRI abdomen, CTA head, CT CAP I have personally reviewed pertinent reports.   and I have personally reviewed pertinent films in PACS     Medications:  Scheduled PRN   dexamethasone, 4 mg, Q6H KEELY  docusate sodium, 100 mg, BID  FLUoxetine, 20 mg, Daily  levETIRAcetam, 750 mg, Q12H KEELY  levothyroxine, 75 mcg, Early Morning  pantoprazole, 40 mg, Early Morning      acetaminophen, 650 mg, Q6H PRN  heparin (porcine), , PRN  lidocaine (PF), , PRN  nitroGLYcerin, , PRN  verapamil, , PRN       Continuous    lactated ringers, 125 mL/hr, Last Rate: 125 mL/hr (01/29/24 0807)  [START ON 1/30/2024] sodium chloride, 100 mL/hr         Labs:    CBC    Recent Labs     01/29/24  0454   WBC 10.10   HGB 13.9   HCT 42.0      BANDSPCT 1     BMP    Recent Labs     01/29/24  0454   SODIUM 138   K 4.0      CO2 25   AGAP 10   BUN 18   CREATININE 0.53*   CALCIUM 9.1       Coags    Recent Labs     01/29/24  0454   INR 1.10   PTT 28        Additional Electrolytes  No recent results       Blood Gas    No recent results  No recent results LFTs  No recent results    Infectious  No recent results  Glucose  Recent Labs     01/29/24  0454   GLUC 104                  Brit No MD

## 2024-01-29 NOTE — SEDATION DOCUMENTATION
Cerebral angiogram successfully completed by Dr. Lowery without complications. Tolerated well. TR band with 18ml air - patent hemostasis. To transport via stretcher to pacu by crna and ir rn

## 2024-01-29 NOTE — ASSESSMENT & PLAN NOTE
4.3 cm meningioma w/ vasogenic edema and midline shift present on outpatient MRI  Evaluated by neurosurgery  Patient was started on Decadron  to help with edema  Started on Keppra for seizure prophylaxis  PPI was added for gastric protection  Aspirin on hold  Plan for cerebral angiogram yoday and surgical tumor resection on Tuesday  Continue with neurochecks  Neurosurgery is following

## 2024-01-29 NOTE — ANESTHESIA POSTPROCEDURE EVALUATION
Post-Op Assessment Note    CV Status:  Stable    Pain management: adequate       Mental Status:  Alert   Hydration Status:  Stable   PONV Controlled:  None   Airway Patency:  Patent and adequate     Post Op Vitals Reviewed: Yes    No anethesia notable event occurred.    Staff: CRNA               BP   158/74   Temp      Pulse  66   Resp   14   SpO2   99% on RA

## 2024-01-29 NOTE — ANESTHESIA PREPROCEDURE EVALUATION
Procedure:  IR CEREBRAL ANGIOGRAPHY / INTERVENTION  4.3 cm meningioma w vasogenic edema on MRI on Decadron and Keppra for seizure ppx. Word finding difficulties reported (In my discussion with patient I could not detect any work finding difficulties). Here for angiogram with intervention.     Denies the following: CP/SOB with exertion, asthma, COPD, PAM, stroke/TIA, seizure    Relevant Problems   ENDO   (+) Hypothyroidism      GI/HEPATIC   (+) Hepatic lesion      NEURO/PSYCH   (+) Anxiety        Physical Exam    Airway    Mallampati score: II  TM Distance: >3 FB  Neck ROM: full     Dental   No notable dental hx     Cardiovascular      Pulmonary      Other Findings  post-pubertal.      Anesthesia Plan  ASA Score- 2     Anesthesia Type- IV sedation with anesthesia with ASA Monitors.         Additional Monitors:     Airway Plan:            Plan Factors-Exercise tolerance (METS): >4 METS.    Chart reviewed.   Existing labs reviewed. Patient summary reviewed.    Patient is not a current smoker.      Obstructive sleep apnea risk education given perioperatively.        Induction-     Postoperative Plan-     Informed Consent- Anesthetic plan and risks discussed with patient.  I personally reviewed this patient with the CRNA. Discussed and agreed on the Anesthesia Plan with the CRNA..

## 2024-01-30 ENCOUNTER — APPOINTMENT (OUTPATIENT)
Dept: RADIOLOGY | Facility: HOSPITAL | Age: 64
DRG: 025 | End: 2024-01-30
Payer: COMMERCIAL

## 2024-01-30 ENCOUNTER — ANESTHESIA (INPATIENT)
Dept: PERIOP | Facility: HOSPITAL | Age: 64
DRG: 025 | End: 2024-01-30
Payer: COMMERCIAL

## 2024-01-30 LAB
ABO GROUP BLD: NORMAL
ABO GROUP BLD: NORMAL
ANION GAP SERPL CALCULATED.3IONS-SCNC: 11 MMOL/L
BLD GP AB SCN SERPL QL: NEGATIVE
BUN SERPL-MCNC: 15 MG/DL (ref 5–25)
CALCIUM SERPL-MCNC: 8.2 MG/DL (ref 8.4–10.2)
CHLORIDE SERPL-SCNC: 104 MMOL/L (ref 96–108)
CO2 SERPL-SCNC: 24 MMOL/L (ref 21–32)
CREAT SERPL-MCNC: 0.58 MG/DL (ref 0.6–1.3)
ERYTHROCYTE [DISTWIDTH] IN BLOOD BY AUTOMATED COUNT: 12.6 % (ref 11.6–15.1)
EST. AVERAGE GLUCOSE BLD GHB EST-MCNC: 114 MG/DL
GFR SERPL CREATININE-BSD FRML MDRD: 99 ML/MIN/1.73SQ M
GLUCOSE SERPL-MCNC: 144 MG/DL (ref 65–140)
HBA1C MFR BLD: 5.6 %
HCT VFR BLD AUTO: 41.7 % (ref 34.8–46.1)
HGB BLD-MCNC: 14.1 G/DL (ref 11.5–15.4)
MCH RBC QN AUTO: 31.1 PG (ref 26.8–34.3)
MCHC RBC AUTO-ENTMCNC: 33.8 G/DL (ref 31.4–37.4)
MCV RBC AUTO: 92 FL (ref 82–98)
PLATELET # BLD AUTO: 200 THOUSANDS/UL (ref 149–390)
PMV BLD AUTO: 9.6 FL (ref 8.9–12.7)
POTASSIUM SERPL-SCNC: 4 MMOL/L (ref 3.5–5.3)
RBC # BLD AUTO: 4.54 MILLION/UL (ref 3.81–5.12)
RH BLD: POSITIVE
RH BLD: POSITIVE
SODIUM SERPL-SCNC: 139 MMOL/L (ref 135–147)
SPECIMEN EXPIRATION DATE: NORMAL
WBC # BLD AUTO: 18.51 THOUSAND/UL (ref 4.31–10.16)

## 2024-01-30 PROCEDURE — 70553 MRI BRAIN STEM W/O & W/DYE: CPT

## 2024-01-30 PROCEDURE — 86850 RBC ANTIBODY SCREEN: CPT

## 2024-01-30 PROCEDURE — 61512 CRNEC TREPH EXC MNGIOMA STTL: CPT | Performed by: NEUROLOGICAL SURGERY

## 2024-01-30 PROCEDURE — C1713 ANCHOR/SCREW BN/BN,TIS/BN: HCPCS | Performed by: NEUROLOGICAL SURGERY

## 2024-01-30 PROCEDURE — 80048 BASIC METABOLIC PNL TOTAL CA: CPT | Performed by: EMERGENCY MEDICINE

## 2024-01-30 PROCEDURE — 94664 DEMO&/EVAL PT USE INHALER: CPT

## 2024-01-30 PROCEDURE — 03HY32Z INSERTION OF MONITORING DEVICE INTO UPPER ARTERY, PERCUTANEOUS APPROACH: ICD-10-PCS | Performed by: SURGERY

## 2024-01-30 PROCEDURE — 86901 BLOOD TYPING SEROLOGIC RH(D): CPT

## 2024-01-30 PROCEDURE — 83036 HEMOGLOBIN GLYCOSYLATED A1C: CPT | Performed by: EMERGENCY MEDICINE

## 2024-01-30 PROCEDURE — 00B00ZZ EXCISION OF BRAIN, OPEN APPROACH: ICD-10-PCS | Performed by: NEUROLOGICAL SURGERY

## 2024-01-30 PROCEDURE — 88331 PATH CONSLTJ SURG 1 BLK 1SPC: CPT | Performed by: PATHOLOGY

## 2024-01-30 PROCEDURE — 86900 BLOOD TYPING SEROLOGIC ABO: CPT

## 2024-01-30 PROCEDURE — 88307 TISSUE EXAM BY PATHOLOGIST: CPT | Performed by: PATHOLOGY

## 2024-01-30 PROCEDURE — 85027 COMPLETE CBC AUTOMATED: CPT | Performed by: EMERGENCY MEDICINE

## 2024-01-30 PROCEDURE — 88360 TUMOR IMMUNOHISTOCHEM/MANUAL: CPT | Performed by: PATHOLOGY

## 2024-01-30 PROCEDURE — NC001 PR NO CHARGE: Performed by: ANESTHESIOLOGY

## 2024-01-30 PROCEDURE — 61781 SCAN PROC CRANIAL INTRA: CPT | Performed by: NEUROLOGICAL SURGERY

## 2024-01-30 PROCEDURE — 4A133B1 MONITORING OF ARTERIAL PRESSURE, PERIPHERAL, PERCUTANEOUS APPROACH: ICD-10-PCS | Performed by: SURGERY

## 2024-01-30 PROCEDURE — 88342 IMHCHEM/IMCYTCHM 1ST ANTB: CPT | Performed by: PATHOLOGY

## 2024-01-30 PROCEDURE — 4A133J1 MONITORING OF ARTERIAL PULSE, PERIPHERAL, PERCUTANEOUS APPROACH: ICD-10-PCS | Performed by: SURGERY

## 2024-01-30 PROCEDURE — 61781 SCAN PROC CRANIAL INTRA: CPT | Performed by: PHYSICIAN ASSISTANT

## 2024-01-30 PROCEDURE — 61512 CRNEC TREPH EXC MNGIOMA STTL: CPT | Performed by: PHYSICIAN ASSISTANT

## 2024-01-30 PROCEDURE — A9585 GADOBUTROL INJECTION: HCPCS | Performed by: INTERNAL MEDICINE

## 2024-01-30 DEVICE — IMPLANTABLE DEVICE
Type: IMPLANTABLE DEVICE | Site: CRANIAL | Status: FUNCTIONAL
Brand: THINFLAP SYSTEM

## 2024-01-30 DEVICE — IMPLANTABLE DEVICE
Type: IMPLANTABLE DEVICE | Site: CRANIAL | Status: FUNCTIONAL
Brand: THINFLAP

## 2024-01-30 DEVICE — DURAGEN® PLUS DURAL REGENERATION MATRIX, 3 IN X 3 IN (7.5 CM X 7.5 CM)
Type: IMPLANTABLE DEVICE | Site: BRAIN | Status: FUNCTIONAL
Brand: DURAGEN® PLUS

## 2024-01-30 RX ORDER — DEXAMETHASONE 2 MG/1
2 TABLET ORAL EVERY 6 HOURS SCHEDULED
Status: DISCONTINUED | OUTPATIENT
Start: 2024-02-03 | End: 2024-02-02 | Stop reason: HOSPADM

## 2024-01-30 RX ORDER — ONDANSETRON 2 MG/ML
4 INJECTION INTRAMUSCULAR; INTRAVENOUS ONCE AS NEEDED
Status: DISCONTINUED | OUTPATIENT
Start: 2024-01-30 | End: 2024-01-30

## 2024-01-30 RX ORDER — FENTANYL CITRATE/PF 50 MCG/ML
SYRINGE (ML) INJECTION
Status: COMPLETED
Start: 2024-01-30 | End: 2024-01-30

## 2024-01-30 RX ORDER — HYDRALAZINE HYDROCHLORIDE 25 MG/1
25 TABLET, FILM COATED ORAL EVERY 8 HOURS SCHEDULED
Status: DISCONTINUED | OUTPATIENT
Start: 2024-01-30 | End: 2024-02-02 | Stop reason: HOSPADM

## 2024-01-30 RX ORDER — ONDANSETRON 2 MG/ML
4 INJECTION INTRAMUSCULAR; INTRAVENOUS EVERY 6 HOURS PRN
Status: DISCONTINUED | OUTPATIENT
Start: 2024-01-30 | End: 2024-02-02 | Stop reason: HOSPADM

## 2024-01-30 RX ORDER — HYDROMORPHONE HCL IN WATER/PF 6 MG/30 ML
0.2 PATIENT CONTROLLED ANALGESIA SYRINGE INTRAVENOUS EVERY 4 HOURS PRN
Status: DISCONTINUED | OUTPATIENT
Start: 2024-01-30 | End: 2024-02-02 | Stop reason: HOSPADM

## 2024-01-30 RX ORDER — LABETALOL HYDROCHLORIDE 5 MG/ML
INJECTION, SOLUTION INTRAVENOUS AS NEEDED
Status: DISCONTINUED | OUTPATIENT
Start: 2024-01-30 | End: 2024-01-30

## 2024-01-30 RX ORDER — ACETAMINOPHEN 325 MG/1
975 TABLET ORAL EVERY 8 HOURS SCHEDULED
Status: DISCONTINUED | OUTPATIENT
Start: 2024-01-30 | End: 2024-02-02 | Stop reason: HOSPADM

## 2024-01-30 RX ORDER — EPHEDRINE SULFATE 50 MG/ML
INJECTION INTRAVENOUS AS NEEDED
Status: DISCONTINUED | OUTPATIENT
Start: 2024-01-30 | End: 2024-01-30

## 2024-01-30 RX ORDER — DOCUSATE SODIUM 100 MG/1
100 CAPSULE, LIQUID FILLED ORAL 2 TIMES DAILY
Status: DISCONTINUED | OUTPATIENT
Start: 2024-01-30 | End: 2024-02-02 | Stop reason: HOSPADM

## 2024-01-30 RX ORDER — BISACODYL 10 MG
10 SUPPOSITORY, RECTAL RECTAL DAILY PRN
Status: DISCONTINUED | OUTPATIENT
Start: 2024-01-30 | End: 2024-02-02 | Stop reason: HOSPADM

## 2024-01-30 RX ORDER — FUROSEMIDE 10 MG/ML
INJECTION INTRAMUSCULAR; INTRAVENOUS AS NEEDED
Status: DISCONTINUED | OUTPATIENT
Start: 2024-01-30 | End: 2024-01-30

## 2024-01-30 RX ORDER — PROPOFOL 10 MG/ML
INJECTION, EMULSION INTRAVENOUS AS NEEDED
Status: DISCONTINUED | OUTPATIENT
Start: 2024-01-30 | End: 2024-01-30

## 2024-01-30 RX ORDER — SENNOSIDES 8.6 MG
1 TABLET ORAL DAILY
Status: DISCONTINUED | OUTPATIENT
Start: 2024-01-30 | End: 2024-02-02 | Stop reason: HOSPADM

## 2024-01-30 RX ORDER — LEVETIRACETAM 750 MG/1
750 TABLET ORAL EVERY 12 HOURS SCHEDULED
Qty: 14 TABLET | Refills: 0 | Status: DISCONTINUED | OUTPATIENT
Start: 2024-01-30 | End: 2024-02-02 | Stop reason: HOSPADM

## 2024-01-30 RX ORDER — DEXAMETHASONE 4 MG/1
4 TABLET ORAL EVERY 8 HOURS SCHEDULED
Status: DISCONTINUED | OUTPATIENT
Start: 2024-02-01 | End: 2024-02-02 | Stop reason: HOSPADM

## 2024-01-30 RX ORDER — CEFAZOLIN SODIUM 1 G/50ML
1000 SOLUTION INTRAVENOUS ONCE
Status: COMPLETED | OUTPATIENT
Start: 2024-01-30 | End: 2024-01-30

## 2024-01-30 RX ORDER — DEXAMETHASONE SODIUM PHOSPHATE 10 MG/ML
INJECTION, SOLUTION INTRAMUSCULAR; INTRAVENOUS AS NEEDED
Status: DISCONTINUED | OUTPATIENT
Start: 2024-01-30 | End: 2024-01-30

## 2024-01-30 RX ORDER — SODIUM CHLORIDE, SODIUM LACTATE, POTASSIUM CHLORIDE, CALCIUM CHLORIDE 600; 310; 30; 20 MG/100ML; MG/100ML; MG/100ML; MG/100ML
INJECTION, SOLUTION INTRAVENOUS CONTINUOUS PRN
Status: DISCONTINUED | OUTPATIENT
Start: 2024-01-30 | End: 2024-01-30

## 2024-01-30 RX ORDER — LIDOCAINE HYDROCHLORIDE 20 MG/ML
INJECTION, SOLUTION EPIDURAL; INFILTRATION; INTRACAUDAL; PERINEURAL AS NEEDED
Status: DISCONTINUED | OUTPATIENT
Start: 2024-01-30 | End: 2024-01-30

## 2024-01-30 RX ORDER — MANNITOL 250 MG/ML
INJECTION, SOLUTION INTRAVENOUS AS NEEDED
Status: DISCONTINUED | OUTPATIENT
Start: 2024-01-30 | End: 2024-01-30

## 2024-01-30 RX ORDER — HYDRALAZINE HYDROCHLORIDE 20 MG/ML
10 INJECTION INTRAMUSCULAR; INTRAVENOUS EVERY 4 HOURS PRN
Status: DISCONTINUED | OUTPATIENT
Start: 2024-01-30 | End: 2024-02-02 | Stop reason: HOSPADM

## 2024-01-30 RX ORDER — FENTANYL CITRATE 50 UG/ML
INJECTION, SOLUTION INTRAMUSCULAR; INTRAVENOUS AS NEEDED
Status: DISCONTINUED | OUTPATIENT
Start: 2024-01-30 | End: 2024-01-30

## 2024-01-30 RX ORDER — CHLORHEXIDINE GLUCONATE ORAL RINSE 1.2 MG/ML
15 SOLUTION DENTAL ONCE
Status: DISCONTINUED | OUTPATIENT
Start: 2024-01-30 | End: 2024-01-30 | Stop reason: HOSPADM

## 2024-01-30 RX ORDER — HYDROMORPHONE HCL IN WATER/PF 6 MG/30 ML
0.2 PATIENT CONTROLLED ANALGESIA SYRINGE INTRAVENOUS
Status: DISCONTINUED | OUTPATIENT
Start: 2024-01-30 | End: 2024-01-30

## 2024-01-30 RX ORDER — ZINC SULFATE 50(220)MG
220 CAPSULE ORAL DAILY
Status: DISCONTINUED | OUTPATIENT
Start: 2024-01-30 | End: 2024-02-02 | Stop reason: HOSPADM

## 2024-01-30 RX ORDER — DEXAMETHASONE 2 MG/1
2 TABLET ORAL
Status: DISCONTINUED | OUTPATIENT
Start: 2024-02-09 | End: 2024-02-02 | Stop reason: HOSPADM

## 2024-01-30 RX ORDER — HYDROMORPHONE HCL/PF 1 MG/ML
SYRINGE (ML) INJECTION AS NEEDED
Status: DISCONTINUED | OUTPATIENT
Start: 2024-01-30 | End: 2024-01-30

## 2024-01-30 RX ORDER — CALCIUM CARBONATE 500 MG/1
1000 TABLET, CHEWABLE ORAL DAILY PRN
Status: DISCONTINUED | OUTPATIENT
Start: 2024-01-30 | End: 2024-02-02 | Stop reason: HOSPADM

## 2024-01-30 RX ORDER — CEFAZOLIN SODIUM 1 G/50ML
1000 SOLUTION INTRAVENOUS EVERY 8 HOURS
Qty: 150 ML | Refills: 0 | Status: COMPLETED | OUTPATIENT
Start: 2024-01-30 | End: 2024-01-31

## 2024-01-30 RX ORDER — SODIUM CHLORIDE 9 MG/ML
75 INJECTION, SOLUTION INTRAVENOUS CONTINUOUS
Status: DISCONTINUED | OUTPATIENT
Start: 2024-01-30 | End: 2024-01-30

## 2024-01-30 RX ORDER — ASCORBIC ACID 500 MG
500 TABLET ORAL 2 TIMES DAILY
Status: DISCONTINUED | OUTPATIENT
Start: 2024-01-30 | End: 2024-02-02 | Stop reason: HOSPADM

## 2024-01-30 RX ORDER — DIAZEPAM 5 MG/ML
5 INJECTION, SOLUTION INTRAMUSCULAR; INTRAVENOUS ONCE
Status: COMPLETED | OUTPATIENT
Start: 2024-01-30 | End: 2024-01-30

## 2024-01-30 RX ORDER — DEXAMETHASONE 2 MG/1
2 TABLET ORAL EVERY 12 HOURS SCHEDULED
Status: DISCONTINUED | OUTPATIENT
Start: 2024-02-07 | End: 2024-02-02 | Stop reason: HOSPADM

## 2024-01-30 RX ORDER — FENTANYL CITRATE/PF 50 MCG/ML
12.5 SYRINGE (ML) INJECTION
Status: DISCONTINUED | OUTPATIENT
Start: 2024-01-30 | End: 2024-01-30

## 2024-01-30 RX ORDER — ONDANSETRON 2 MG/ML
INJECTION INTRAMUSCULAR; INTRAVENOUS AS NEEDED
Status: DISCONTINUED | OUTPATIENT
Start: 2024-01-30 | End: 2024-01-30

## 2024-01-30 RX ORDER — ROCURONIUM BROMIDE 10 MG/ML
INJECTION, SOLUTION INTRAVENOUS AS NEEDED
Status: DISCONTINUED | OUTPATIENT
Start: 2024-01-30 | End: 2024-01-30

## 2024-01-30 RX ORDER — OXYCODONE HYDROCHLORIDE 5 MG/1
5 TABLET ORAL EVERY 4 HOURS PRN
Status: DISCONTINUED | OUTPATIENT
Start: 2024-01-30 | End: 2024-02-02 | Stop reason: HOSPADM

## 2024-01-30 RX ORDER — SODIUM CHLORIDE, SODIUM LACTATE, POTASSIUM CHLORIDE, CALCIUM CHLORIDE 600; 310; 30; 20 MG/100ML; MG/100ML; MG/100ML; MG/100ML
125 INJECTION, SOLUTION INTRAVENOUS CONTINUOUS
Status: DISCONTINUED | OUTPATIENT
Start: 2024-01-30 | End: 2024-01-30

## 2024-01-30 RX ORDER — SODIUM CHLORIDE 9 MG/ML
INJECTION, SOLUTION INTRAVENOUS CONTINUOUS PRN
Status: DISCONTINUED | OUTPATIENT
Start: 2024-01-30 | End: 2024-01-30

## 2024-01-30 RX ORDER — LIDOCAINE HYDROCHLORIDE AND EPINEPHRINE 10; 10 MG/ML; UG/ML
INJECTION, SOLUTION INFILTRATION; PERINEURAL AS NEEDED
Status: DISCONTINUED | OUTPATIENT
Start: 2024-01-30 | End: 2024-01-30 | Stop reason: HOSPADM

## 2024-01-30 RX ORDER — ACETAMINOPHEN 325 MG/1
650 TABLET ORAL ONCE
Status: DISCONTINUED | OUTPATIENT
Start: 2024-01-30 | End: 2024-01-30

## 2024-01-30 RX ORDER — DEXAMETHASONE 2 MG/1
2 TABLET ORAL EVERY 8 HOURS SCHEDULED
Status: DISCONTINUED | OUTPATIENT
Start: 2024-02-05 | End: 2024-02-02 | Stop reason: HOSPADM

## 2024-01-30 RX ORDER — DEXAMETHASONE 4 MG/1
4 TABLET ORAL EVERY 6 HOURS SCHEDULED
Status: COMPLETED | OUTPATIENT
Start: 2024-01-30 | End: 2024-02-01

## 2024-01-30 RX ORDER — GADOBUTROL 604.72 MG/ML
6 INJECTION INTRAVENOUS
Status: COMPLETED | OUTPATIENT
Start: 2024-01-30 | End: 2024-01-30

## 2024-01-30 RX ADMIN — SODIUM CHLORIDE, SODIUM LACTATE, POTASSIUM CHLORIDE, AND CALCIUM CHLORIDE: .6; .31; .03; .02 INJECTION, SOLUTION INTRAVENOUS at 08:27

## 2024-01-30 RX ADMIN — Medication 20000 UNITS: at 14:35

## 2024-01-30 RX ADMIN — NICARDIPINE HYDROCHLORIDE 100 MCG: 2.5 INJECTION, SOLUTION INTRAVENOUS at 13:08

## 2024-01-30 RX ADMIN — FENTANYL CITRATE 12.5 MCG: 50 INJECTION INTRAMUSCULAR; INTRAVENOUS at 13:20

## 2024-01-30 RX ADMIN — CEFAZOLIN SODIUM 2000 MG: 1 SOLUTION INTRAVENOUS at 12:01

## 2024-01-30 RX ADMIN — NICARDIPINE HYDROCHLORIDE 100 MCG: 2.5 INJECTION, SOLUTION INTRAVENOUS at 10:06

## 2024-01-30 RX ADMIN — PROPOFOL 20 MG: 10 INJECTION, EMULSION INTRAVENOUS at 10:00

## 2024-01-30 RX ADMIN — FENTANYL CITRATE 50 MCG: 50 INJECTION INTRAMUSCULAR; INTRAVENOUS at 09:59

## 2024-01-30 RX ADMIN — HYDROMORPHONE HYDROCHLORIDE 0.2 MG: 0.2 INJECTION, SOLUTION INTRAMUSCULAR; INTRAVENOUS; SUBCUTANEOUS at 18:17

## 2024-01-30 RX ADMIN — OXYCODONE HYDROCHLORIDE AND ACETAMINOPHEN 500 MG: 500 TABLET ORAL at 18:13

## 2024-01-30 RX ADMIN — FLUOXETINE HYDROCHLORIDE 20 MG: 20 CAPSULE ORAL at 14:26

## 2024-01-30 RX ADMIN — ROCURONIUM BROMIDE 70 MG: 10 INJECTION, SOLUTION INTRAVENOUS at 08:32

## 2024-01-30 RX ADMIN — LABETALOL HYDROCHLORIDE 5 MG: 5 INJECTION, SOLUTION INTRAVENOUS at 13:08

## 2024-01-30 RX ADMIN — SODIUM CHLORIDE 0.15 MCG/KG/MIN: 900 INJECTION INTRAVENOUS at 08:40

## 2024-01-30 RX ADMIN — HYDRALAZINE HYDROCHLORIDE 10 MG: 20 INJECTION INTRAMUSCULAR; INTRAVENOUS at 21:13

## 2024-01-30 RX ADMIN — DEXAMETHASONE 4 MG: 4 TABLET ORAL at 14:25

## 2024-01-30 RX ADMIN — PROPOFOL 50 MG: 10 INJECTION, EMULSION INTRAVENOUS at 08:36

## 2024-01-30 RX ADMIN — ROCURONIUM BROMIDE 10 MG: 10 INJECTION, SOLUTION INTRAVENOUS at 10:52

## 2024-01-30 RX ADMIN — DEXAMETHASONE SODIUM PHOSPHATE 10 MG: 10 INJECTION, SOLUTION INTRAMUSCULAR; INTRAVENOUS at 09:18

## 2024-01-30 RX ADMIN — HYDROMORPHONE HYDROCHLORIDE 0.25 MG: 1 INJECTION, SOLUTION INTRAMUSCULAR; INTRAVENOUS; SUBCUTANEOUS at 11:00

## 2024-01-30 RX ADMIN — ONDANSETRON 4 MG: 2 INJECTION INTRAMUSCULAR; INTRAVENOUS at 12:01

## 2024-01-30 RX ADMIN — HYDROMORPHONE HYDROCHLORIDE 0.25 MG: 1 INJECTION, SOLUTION INTRAMUSCULAR; INTRAVENOUS; SUBCUTANEOUS at 10:09

## 2024-01-30 RX ADMIN — DEXAMETHASONE 4 MG: 4 TABLET ORAL at 06:06

## 2024-01-30 RX ADMIN — CEFAZOLIN SODIUM 1000 MG: 1 SOLUTION INTRAVENOUS at 20:12

## 2024-01-30 RX ADMIN — ACETAMINOPHEN 975 MG: 325 TABLET, FILM COATED ORAL at 21:17

## 2024-01-30 RX ADMIN — PANTOPRAZOLE SODIUM 40 MG: 40 TABLET, DELAYED RELEASE ORAL at 06:06

## 2024-01-30 RX ADMIN — SUGAMMADEX 200 MG: 100 INJECTION, SOLUTION INTRAVENOUS at 12:36

## 2024-01-30 RX ADMIN — LABETALOL HYDROCHLORIDE 5 MG: 5 INJECTION, SOLUTION INTRAVENOUS at 13:01

## 2024-01-30 RX ADMIN — LIDOCAINE HYDROCHLORIDE 100 MG: 20 INJECTION, SOLUTION EPIDURAL; INFILTRATION; INTRACAUDAL; PERINEURAL at 08:32

## 2024-01-30 RX ADMIN — ZINC SULFATE 220 MG (50 MG) CAPSULE 220 MG: CAPSULE at 14:35

## 2024-01-30 RX ADMIN — GADOBUTROL 6 ML: 604.72 INJECTION INTRAVENOUS at 23:40

## 2024-01-30 RX ADMIN — LEVOTHYROXINE SODIUM 75 MCG: 75 TABLET ORAL at 06:06

## 2024-01-30 RX ADMIN — PROPOFOL 30 MG: 10 INJECTION, EMULSION INTRAVENOUS at 12:35

## 2024-01-30 RX ADMIN — PHENYLEPHRINE HYDROCHLORIDE 20 MCG/MIN: 10 INJECTION INTRAVENOUS at 09:05

## 2024-01-30 RX ADMIN — ROCURONIUM BROMIDE 20 MG: 10 INJECTION, SOLUTION INTRAVENOUS at 09:35

## 2024-01-30 RX ADMIN — ACETAMINOPHEN 650 MG: 325 TABLET, FILM COATED ORAL at 14:25

## 2024-01-30 RX ADMIN — LEVETIRACETAM 750 MG: 750 TABLET, FILM COATED ORAL at 21:18

## 2024-01-30 RX ADMIN — SENNOSIDES 8.6 MG: 8.6 TABLET, FILM COATED ORAL at 14:35

## 2024-01-30 RX ADMIN — SODIUM CHLORIDE: 0.9 INJECTION, SOLUTION INTRAVENOUS at 08:40

## 2024-01-30 RX ADMIN — EPHEDRINE SULFATE 5 MG: 50 INJECTION, SOLUTION INTRAVENOUS at 10:36

## 2024-01-30 RX ADMIN — DEXAMETHASONE 4 MG: 4 TABLET ORAL at 23:59

## 2024-01-30 RX ADMIN — NICARDIPINE HYDROCHLORIDE 100 MCG: 2.5 INJECTION, SOLUTION INTRAVENOUS at 12:42

## 2024-01-30 RX ADMIN — ONDANSETRON 4 MG: 2 INJECTION INTRAMUSCULAR; INTRAVENOUS at 15:04

## 2024-01-30 RX ADMIN — MANNITOL 32 G: 250 INJECTION, SOLUTION INTRAVENOUS at 09:19

## 2024-01-30 RX ADMIN — FENTANYL CITRATE 50 MCG: 50 INJECTION INTRAMUSCULAR; INTRAVENOUS at 08:32

## 2024-01-30 RX ADMIN — FUROSEMIDE 10 MG: 10 INJECTION, SOLUTION INTRAMUSCULAR; INTRAVENOUS at 09:24

## 2024-01-30 RX ADMIN — FENTANYL CITRATE 12.5 MCG: 50 INJECTION INTRAMUSCULAR; INTRAVENOUS at 13:22

## 2024-01-30 RX ADMIN — CEFAZOLIN SODIUM 2000 MG: 1 SOLUTION INTRAVENOUS at 09:22

## 2024-01-30 RX ADMIN — SODIUM CHLORIDE 75 ML/HR: 0.9 INJECTION, SOLUTION INTRAVENOUS at 14:35

## 2024-01-30 RX ADMIN — DIAZEPAM 5 MG: 10 INJECTION, SOLUTION INTRAMUSCULAR; INTRAVENOUS at 22:01

## 2024-01-30 RX ADMIN — PROPOFOL 70 MCG/KG/MIN: 10 INJECTION, EMULSION INTRAVENOUS at 08:40

## 2024-01-30 RX ADMIN — ONDANSETRON 4 MG: 2 INJECTION INTRAMUSCULAR; INTRAVENOUS at 21:51

## 2024-01-30 RX ADMIN — HYDRALAZINE HYDROCHLORIDE 25 MG: 25 TABLET, FILM COATED ORAL at 18:13

## 2024-01-30 RX ADMIN — DOCUSATE SODIUM 100 MG: 100 CAPSULE, LIQUID FILLED ORAL at 18:13

## 2024-01-30 RX ADMIN — SODIUM CHLORIDE, SODIUM LACTATE, POTASSIUM CHLORIDE, AND CALCIUM CHLORIDE 125 ML/HR: .6; .31; .03; .02 INJECTION, SOLUTION INTRAVENOUS at 13:23

## 2024-01-30 RX ADMIN — ONDANSETRON 4 MG: 2 INJECTION INTRAMUSCULAR; INTRAVENOUS at 18:13

## 2024-01-30 RX ADMIN — HYDRALAZINE HYDROCHLORIDE 10 MG: 20 INJECTION, SOLUTION INTRAMUSCULAR; INTRAVENOUS at 16:52

## 2024-01-30 RX ADMIN — EPHEDRINE SULFATE 2.5 MG: 50 INJECTION, SOLUTION INTRAVENOUS at 12:00

## 2024-01-30 RX ADMIN — LEVETIRACETAM 1000 MG: 100 INJECTION, SOLUTION INTRAVENOUS at 09:10

## 2024-01-30 RX ADMIN — DEXAMETHASONE 4 MG: 4 TABLET ORAL at 19:32

## 2024-01-30 RX ADMIN — FENTANYL CITRATE 12.5 MCG: 50 INJECTION INTRAMUSCULAR; INTRAVENOUS at 13:36

## 2024-01-30 RX ADMIN — EPHEDRINE SULFATE 5 MG: 50 INJECTION, SOLUTION INTRAVENOUS at 09:10

## 2024-01-30 RX ADMIN — LABETALOL HYDROCHLORIDE 5 MG: 5 INJECTION, SOLUTION INTRAVENOUS at 12:56

## 2024-01-30 RX ADMIN — FENTANYL CITRATE 12.5 MCG: 50 INJECTION INTRAMUSCULAR; INTRAVENOUS at 13:26

## 2024-01-30 RX ADMIN — EPHEDRINE SULFATE 5 MG: 50 INJECTION, SOLUTION INTRAVENOUS at 09:25

## 2024-01-30 RX ADMIN — ROCURONIUM BROMIDE 10 MG: 10 INJECTION, SOLUTION INTRAVENOUS at 10:27

## 2024-01-30 RX ADMIN — PROPOFOL 120 MG: 10 INJECTION, EMULSION INTRAVENOUS at 08:32

## 2024-01-30 RX ADMIN — NICARDIPINE HYDROCHLORIDE 100 MCG: 2.5 INJECTION, SOLUTION INTRAVENOUS at 12:46

## 2024-01-30 RX ADMIN — FENTANYL CITRATE 12.5 MCG: 50 INJECTION INTRAMUSCULAR; INTRAVENOUS at 13:39

## 2024-01-30 NOTE — ANESTHESIA PROCEDURE NOTES
"Arterial Line Insertion    Performed by: Sanjay Charles CRNA  Authorized by: Natalya Schaeffer MD  Consent: Verbal consent obtained. Written consent obtained.  Risks and benefits: risks, benefits and alternatives were discussed  Consent given by: patient  Patient understanding: patient states understanding of the procedure being performed  Patient consent: the patient's understanding of the procedure matches consent given  Procedure consent: procedure consent matches procedure scheduled  Relevant documents: relevant documents present and verified  Patient identity confirmed: arm band  Time out: Immediately prior to procedure a \"time out\" was called to verify the correct patient, procedure, equipment, support staff and site/side marked as required.  Preparation: Patient was prepped and draped in the usual sterile fashion.  Indications: hemodynamic monitoring  Orientation:  Left  Location: radial artery  Sedation:  Patient sedated: yes  Vitals: Vital signs were monitored during sedation.    Procedure Details:  Nikita's test normal: yes  Needle gauge: 20  Seldinger technique: Seldinger technique used  Number of attempts: 1    Post-procedure:  Post-procedure: dressing applied  Waveform: good waveform and waveform confirmed          "

## 2024-01-30 NOTE — PLAN OF CARE
Problem: NEUROSENSORY - ADULT  Goal: Achieves stable or improved neurological status  Description: INTERVENTIONS  - Monitor and report changes in neurological status  - Monitor vital signs such as temperature, blood pressure, glucose, and any other labs ordered   - Initiate measures to prevent increased intracranial pressure  - Monitor for seizure activity and implement precautions if appropriate      Outcome: Progressing  Goal: Remains free of injury related to seizures activity  Description: INTERVENTIONS  - Maintain airway, patient safety  and administer oxygen as ordered  - Monitor patient for seizure activity, document and report duration and description of seizure to physician/advanced practitioner  - If seizure occurs,  ensure patient safety during seizure  - Reorient patient post seizure  - Seizure pads on all 4 side rails  - Instruct patient/family to notify RN of any seizure activity including if an aura is experienced  - Instruct patient/family to call for assistance with activity based on nursing assessment  - Administer anti-seizure medications if ordered    Outcome: Progressing  Goal: Achieves maximal functionality and self care  Description: INTERVENTIONS  - Monitor swallowing and airway patency with patient fatigue and changes in neurological status  - Encourage and assist patient to increase activity and self care.   - Encourage visually impaired, hearing impaired and aphasic patients to use assistive/communication devices  Outcome: Progressing     Problem: SAFETY ADULT  Goal: Patient will remain free of falls  Description: INTERVENTIONS:  - Educate patient/family on patient safety including physical limitations  - Instruct patient to call for assistance with activity   - Consult OT/PT to assist with strengthening/mobility   - Keep Call bell within reach  - Keep bed low and locked with side rails adjusted as appropriate  - Keep care items and personal belongings within reach  - Initiate and  maintain comfort rounds  - Make Fall Risk Sign visible to staff  - Offer Toileting every 2 Hours, in advance of need  - Initiate/Maintain bed alarm  - Obtain necessary fall risk management equipment: alarm  - Apply yellow socks and bracelet for high fall risk patients  - Consider moving patient to room near nurses station  Outcome: Progressing

## 2024-01-30 NOTE — ANESTHESIA PROCEDURE NOTES
Arterial Line Insertion    Performed by: Sanjay Charles CRNA  Authorized by: Natalya Schaeffer MD  Preparation: Patient was prepped and draped in the usual sterile fashion.  Indications: hemodynamic monitoring    Procedure Details:  Needle gauge: 20    Post-procedure:  Post-procedure: dressing applied  Patient tolerance: Patient tolerated the procedure well with no immediate complications

## 2024-01-30 NOTE — PROGRESS NOTES
[facility]  Progress Note: Critical Care  Name: Mary Ashby 63 y.o. female I MRN: [id number]  Unit/Bed#: ICU 04 I Date of Admission: 1/24/2024   Date of Service: 1/30/2024 I Hospital Day: 6    Assessment/Plan   Neuro:   Diagnosis: Left frontal meningioma, anterior left middle cranial fossa; likely focal-onset, retained awareness seizures given description of aphasia/apraxia episodes - patient reports these episodes stopped since starting Keppra  Patient presented with word finding difficulties intermittently for 5 years, increasing over the past month  MRI (1/23): 4.3 cm probable meningioma in the anterior aspect of the left middle cranial fossa with surrounding edema  Plan:   Neurosurgery on board, cerebral angio 1/29, plan for resection 1/30 with Dr. 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 CAM-ICU. Delirium precautions. Frequent re-orientation as able. Regulate sleep/wake cycle.   Pain control        CV:   Diagnosis: No critical issues  Plan: Continuous cardiopulmonary monitoring.  Maintain MAP > 65.  Monitor resuscitative endpoints.     Pulm:  Diagnosis: Lung nodules  CT scan with multiple bilateral pulmonary nodules measuring up to 4 mm  Plan: Follow-up CT chest in 3 months to rule out metastatic disease  Continuous pulse ox.  Pulmonary toilet.  Elevate HOB.  Maintain O2 sat greater than 92%  Supplemental O2: If needed     GI:   Diagnosis: Gallbladder polyp, hepatic lesions  CT demonstrating multiple hypoattenuating hepatic lesions-MRI findings most consistent with benign hepatic cysts  MRI demonstrating gallbladder polyps, most consistent with  a benign finding  Plan: Outpatient workup including RUQ US  Plan: Bowel regimen:  prn. Monitor stool output and quality.  GI prophylaxis: If indicated     :   No critical issues  Plan: Strict I/O. Monitor urine output and renal indices. Avoid nephrotoxins.          F/E/N:   F: Fluid resuscitation prn.  E: Monitor and replete electrolytes for ionized calcium >1.12, Mg >2, Phos >3, K >4.  N: Regular diet         Heme/Onc:   Diagnosis:,, Lung nodules, gallbladder polyp, paraspinal masses, meningioma  Plan: Outpatient follow-up  Stable H&H.  Monitor Hgb and transfuse for Hgb <7 or based on hemodynamics if actively bleeding. Monitor platelets.  VTE prophylaxis: pharm ppx when cleared by neurosurgery. Sequential compression devices and/or foot pumps.        Endo:   Diagnosis: Hypothyroidism, normal TSH  Plan: Continue levothyroxine  Monitor blood glucose for goal 140-180.         ID:   No critical issues  Monitor fever curve and WBC. Maintain normothermia. Daily CHG bath, Peridex oral rinse, and nasal antiseptic while in ICU.  Tylenol prn for fever management        MSK/Skin:   Frequent turning and repositioning. Pressure injury prevention. Monitor for skin breakdown. PT/OT when appropriate. Encourage OOBTC and ambulation when appropriate. Local wound care prn. Forced warm air blanket if needed for hypothermia.      Disposition: Critical care    ICU Core Measures     A: Assess, Prevent, and Manage Pain Has pain been assessed? Yes  Need for changes to pain regimen? No   B: Both SAT/SAT  N/A   C: Choice of Sedation RASS Goal: N/A patient not on sedation  Need for changes to sedation or analgesia regimen? NA   D: Delirium CAM-ICU: Negative   E: Early Mobility  Plan for early mobility? Yes   F: Family Engagement Plan for family engagement today? Yes         Prophylaxis:  VTE Contraindicated secondary to: Surgery   Stress Ulcer  covered bypantoprazole (PROTONIX) EC tablet 40 mg [[id number]]        Significant 24hr Events      24hr events: Patient s/p MMA embo in prep for today's meningioma resection; seen post resection w/ drain in place. Patient having intermittent right eye blurring for close/fine print. Otherwise neurologically non-focal     Subjective     Review of Systems   Constitutional:  Positive for fatigue. Negative for fever.   HENT:  Negative for trouble swallowing.    Eyes:  Positive for visual disturbance.   Respiratory:  Negative for shortness of breath.    Cardiovascular:  Negative for chest pain.   Gastrointestinal:  Negative for diarrhea, nausea and vomiting.   Neurological:  Positive for light-headedness and headaches. Negative for dizziness, tremors, weakness and numbness.   Psychiatric/Behavioral:  Negative for sleep disturbance.       Objective                            Vitals I/O      Most Recent Min/Max in 24hrs   Temp 97.9 °F (36.6 °C) Temp  Min: 97.2 °F (36.2 °C)  Max: 98.2 °F (36.8 °C)   Pulse 62 Pulse  Min: 54  Max: 78   Resp 20 Resp  Min: 13  Max: 43   /60 BP  Min: 99/45  Max: 168/87   O2 Sat 98 % SpO2  Min: 96 %  Max: 99 %      Intake/Output Summary (Last 24 hours) at 1/30/2024 0704  Last data filed at 1/30/2024 0600  Gross per 24 hour   Intake 958.75 ml   Output 1550 ml   Net -591.25 ml       Diet NPO; Sips with meds  Diet NPO    Invasive Monitoring           Physical Exam   Physical Exam  Eyes:      Extraocular Movements: Extraocular movements intact.      Pupils: Pupils are equal, round, and reactive to light.   HENT:      Head: Normocephalic.      Comments: Berto drain in place     Nose: No congestion or rhinorrhea.      Mouth/Throat:      Mouth: Mucous membranes are moist.      Pharynx: No oropharyngeal exudate.   Neck:      Vascular: No JVD.   Cardiovascular:      Rate and Rhythm: Normal rate and regular rhythm.   Musculoskeletal:      Right lower leg: No edema.      Left lower leg: No edema.   Abdominal: General: Bowel sounds are normal. There is no distension.      Palpations: Abdomen is  soft.      Tenderness: There is no abdominal tenderness.   Constitutional:       General: She is not in acute distress.     Appearance: She is well-developed and well-nourished.   Pulmonary:      Effort: Pulmonary effort is normal. No respiratory distress.      Breath sounds: Normal breath sounds. No stridor. No wheezing or rhonchi.   Psychiatric:         Speech: Speech is not no expressive aphasia.   Neurological:      General: No focal deficit present.      Mental Status: She is alert and oriented to person, place and time.      Cranial Nerves: No dysarthria or facial asymmetry.      Sensory: No sensory deficit.      Motor: Strength full and intact in all extremities. No pronator drift or motor deficit.        Clonus present (non-sustained, 2-3 beats, b/l).          Diagnostic Studies      EKG: None available for review  Imaging: MRI LS, abdomen pelvis, brain, CT, IR cerebral angioI have personally reviewed pertinent reports.   and I have personally reviewed pertinent films in PACS     Medications:  Scheduled PRN   chlorhexidine, 15 mL, Once  dexamethasone, 4 mg, Q6H KEELY  docusate sodium, 100 mg, BID  FLUoxetine, 20 mg, Daily  levETIRAcetam, 750 mg, Q12H KEELY  levothyroxine, 75 mcg, Early Morning  pantoprazole, 40 mg, Early Morning      acetaminophen, 650 mg, Q6H PRN  hydrALAZINE, 10 mg, Q6H PRN       Continuous          Labs:    CBC    Recent Labs     01/29/24  0454   WBC 10.10   HGB 13.9   HCT 42.0      BANDSPCT 1     BMP    Recent Labs     01/29/24  0454   SODIUM 138   K 4.0      CO2 25   AGAP 10   BUN 18   CREATININE 0.53*   CALCIUM 9.1       Coags    Recent Labs     01/29/24  0454   INR 1.10   PTT 28        Additional Electrolytes  No recent results       Blood Gas    No recent results  No recent results LFTs  No recent results    Infectious  No recent results  Glucose  Recent Labs     01/29/24  0454   GLUC 104                   Brit No MD

## 2024-01-30 NOTE — OP NOTE
OPERATIVE REPORT  PATIENT NAME: Mary Ashby    :  1960  MRN: [id number]  Pt Location: BE OR ROOM 17    SURGERY DATE: 2024    Surgeons and Role:     * Placido Lowery MD - Primary     * Mc Puga PA-C - Assisting    Preop Diagnosis:  Meningioma (HCC) [D32.9]    Post-Op Diagnosis Codes:     * Meningioma (HCC) [D32.9]    Procedure(s):  Left - IMAGE GUIDED LEFT PTERIONAL CRANIOTOMY FOR TUMOR    Specimen(s):  ID Type Source Tests Collected by Time Destination   1 : Left sphenoid wing brain mass Tissue Brain TISSUE EXAM Placido Lowery MD 2024 11:13 AM    2 : Left sphenoid wing brain mass Tissue Brain TISSUE EXAM Placido Lowery MD 2024 11:14 AM        Estimated Blood Loss:   100 mL    Drains:  Closed/Suction Drain Left Head Bulb 10 Fr. (Active)   Drainage Appearance Bloody 24 1217   Status To bulb suction 24 1217   Number of days: 0       Urethral Catheter Latex 16 Fr. (Active)   Collection Container Standard drainage bag 24 0852   Securement Method Securing device (Describe) 24 1242   Number of days: 0       Anesthesia Type:   General    Operative Indications:  Meningioma (HCC) [D32.9]  63-year-old female who presented to the hospital with speech and word finding difficulties.  MRI consistent with left temporal/sphenoid wing meningioma.  After embolization elected to proceed with resection.    Operative Findings:  Frozen pathology consistent with meningioma    Complications:   None    Procedure and Technique:  After obtaining written informed consent the patient was brought to the operating room.  General anesthesia was induced.  The patient was then placed in Gilbert head rivera with her head returned to the right exposing her left pterion. Neuro navigation was then registered and confirmed to be accurate. Image guidance from the [employer] Stealth was used throughout the duration of the case. Images were loaded into the system and used for preoperative planning  as well as intraoperative guidance. It was critically important through the duration of the case for navigation and avoidance of critical structures.      After using navigation to confirm location of the tumor a pterional incision was planned.  Her hair was then clipped and had prepped and draped in the usual sterile fashion.  Surgical timeout was performed.  20 cc of 1% lidocaine with 100,000 epinephrine was infiltrated along the planned incision.  10 blade was used to open the skin taking care to protect the underlying temporalis fascia.  This was then opened separately with monopolar cautery.  The myocutaneous flap was then reflected anteriorly and held in place with rubber bands and sutures.  Next a [employer] drill was used to make a bur hole at the keyhole, temporal fossa, and posterior parietal bone.  These were then connected with a craniotome and the bone flap was then elevated.  Peripheral tack up stitches were placed.  I then proceeded to drill a significant amount of the pterion, sphenoid wing, and down into the temporal fossa to expose the majority of the dural attachments.  The dura was then opened and reflected anteriorly.  Upon doing this tumor was immediately visible along the temporal fossa.  The dura was carefully dissected inferiorly and anteriorly along with temporal floor.  After fully detaching the tumor from the dural surface I then began to work circumferentially starting posteriorly and inferiorly and working my way more posteriorly and anteriorly.  Care was taken to protect the underlying MCA.  Portions of the tumor were found to be quite adherent to the underlying cortex.  I circumferentially dissect the tumor and worked my way towards the temporal tip.  Upon doing this I was able to fully dissect the tumor and remove it en bloc.  A portion was then sent as frozen specimen and returned consistent with meningioma.    I then turned my attention to hemostasis.  This was carefully done with  Surgicel.  Along the skull base areas of bony dehiscence were carefully waxed and then Tisseel was placed over this.  The dura was then disconnected and removed and areas where there were tumor attachments.  The wound was copiously irrigated with antibiotic irrigation.  Because of the large dural defect a 3 x 3 DuraGen was used as an inlay and secured in place.  Gelfoam was placed over this and the bone flap was replaced using the titanium plating system.  Again the wound was irrigated with antibiotic irrigation.    A 10 Cameroonian drain was then placed.  The muscle was then closed and reapproximated using 2-0 Vicryl's.  The galea was closed using inverted 2-0 Vicryl's.  The skin was closed using a 3-0 running strata fix.  Sterile dressing and head wrap were then applied.  The drain was secured in place with a 2-0 Prolene.    Patient was then woken from her general anesthetic and found to be in her baseline neurologic condition.  She is transferred to the critical care unit.    There were 2 uninterrupted and correct surgical counts.  A surgical timeout was performed.    There was no qualified resident aid in this case.  As such, due to its complex nature Mc Puga PA-C, was present and assisted for the duration of the case including exposure, tumor resection, and closure.      I was present for the entire procedure.    Patient Disposition:  Critical Care Unit        SIGNATURE: Placido Lowery MD  DATE: January 30, 2024  TIME: 5:16 PM

## 2024-01-30 NOTE — ANESTHESIA PROCEDURE NOTES
Arterial Line Insertion    Performed by: Sanjay Charles CRNA  Authorized by: Sanjay Charles CRNA

## 2024-01-30 NOTE — ANESTHESIA POSTPROCEDURE EVALUATION
Post-Op Assessment Note    CV Status:  Stable  Pain Score: 0    Pain management: adequate       Mental Status:  Alert and awake   Hydration Status:  Euvolemic and stable   PONV Controlled:  Controlled   Airway Patency:  Patent     Post Op Vitals Reviewed: Yes    No anethesia notable event occurred.    Staff: CRNA               BP   146/86   Temp      Pulse  82   Resp   15   SpO2   98%

## 2024-01-30 NOTE — ANESTHESIA PREPROCEDURE EVALUATION
Procedure:  CRANIOTOMY IMAGE-GUIDED FOR TUMOR (Left: Head)    Relevant Problems   ENDO   (+) Hypothyroidism      GI/HEPATIC   (+) Hepatic lesion      NEURO/PSYCH   (+) Anxiety      NPO  No problems with anes   Denies CP/SOB  Active at home w/o restrictions  No GERD    Cr 0.53  Hgb 13.9  Plt 207  INR 1.1    Religious, will accept clotting factors and albumin    Physical Exam    Airway    Mallampati score: II  TM Distance: >3 FB       Dental   No notable dental hx     Cardiovascular  Rhythm: regular, Rate: normal, Cardiovascular exam normal    Pulmonary  Pulmonary exam normal Breath sounds clear to auscultation    Other Findings  post-pubertal.      Anesthesia Plan  ASA Score- 3     Anesthesia Type- general with ASA Monitors.         Additional Monitors: arterial line.    Airway Plan: ETT.           Plan Factors-Exercise tolerance (METS): >4 METS.    Chart reviewed.   Existing labs reviewed.                   Induction- intravenous.    Postoperative Plan- Plan for postoperative opioid use. Planned trial extubation    Informed Consent- Anesthetic plan and risks discussed with patient.  I personally reviewed this patient with the CRNA. Discussed and agreed on the Anesthesia Plan with the CRNA..

## 2024-01-31 LAB
ANION GAP SERPL CALCULATED.3IONS-SCNC: 10 MMOL/L
APTT PPP: 28 SECONDS (ref 23–37)
BUN SERPL-MCNC: 14 MG/DL (ref 5–25)
CALCIUM SERPL-MCNC: 8.9 MG/DL (ref 8.4–10.2)
CHLORIDE SERPL-SCNC: 102 MMOL/L (ref 96–108)
CO2 SERPL-SCNC: 26 MMOL/L (ref 21–32)
CREAT SERPL-MCNC: 0.58 MG/DL (ref 0.6–1.3)
ERYTHROCYTE [DISTWIDTH] IN BLOOD BY AUTOMATED COUNT: 12.7 % (ref 11.6–15.1)
GFR SERPL CREATININE-BSD FRML MDRD: 99 ML/MIN/1.73SQ M
GLUCOSE SERPL-MCNC: 123 MG/DL (ref 65–140)
HCT VFR BLD AUTO: 43.6 % (ref 34.8–46.1)
HGB BLD-MCNC: 14.6 G/DL (ref 11.5–15.4)
INR PPP: 1.09 (ref 0.84–1.19)
MAGNESIUM SERPL-MCNC: 2.1 MG/DL (ref 1.9–2.7)
MCH RBC QN AUTO: 30.6 PG (ref 26.8–34.3)
MCHC RBC AUTO-ENTMCNC: 33.5 G/DL (ref 31.4–37.4)
MCV RBC AUTO: 91 FL (ref 82–98)
PHOSPHATE SERPL-MCNC: 3.9 MG/DL (ref 2.3–4.1)
PLATELET # BLD AUTO: 219 THOUSANDS/UL (ref 149–390)
PMV BLD AUTO: 9.6 FL (ref 8.9–12.7)
POTASSIUM SERPL-SCNC: 4 MMOL/L (ref 3.5–5.3)
PROTHROMBIN TIME: 14 SECONDS (ref 11.6–14.5)
RBC # BLD AUTO: 4.77 MILLION/UL (ref 3.81–5.12)
SODIUM SERPL-SCNC: 138 MMOL/L (ref 135–147)
WBC # BLD AUTO: 16.34 THOUSAND/UL (ref 4.31–10.16)

## 2024-01-31 PROCEDURE — 85610 PROTHROMBIN TIME: CPT | Performed by: PHYSICIAN ASSISTANT

## 2024-01-31 PROCEDURE — 80048 BASIC METABOLIC PNL TOTAL CA: CPT | Performed by: PHYSICIAN ASSISTANT

## 2024-01-31 PROCEDURE — 99024 POSTOP FOLLOW-UP VISIT: CPT | Performed by: NEUROLOGICAL SURGERY

## 2024-01-31 PROCEDURE — 97164 PT RE-EVAL EST PLAN CARE: CPT

## 2024-01-31 PROCEDURE — 84100 ASSAY OF PHOSPHORUS: CPT | Performed by: EMERGENCY MEDICINE

## 2024-01-31 PROCEDURE — 97530 THERAPEUTIC ACTIVITIES: CPT

## 2024-01-31 PROCEDURE — 83735 ASSAY OF MAGNESIUM: CPT | Performed by: EMERGENCY MEDICINE

## 2024-01-31 PROCEDURE — 97168 OT RE-EVAL EST PLAN CARE: CPT

## 2024-01-31 PROCEDURE — 85027 COMPLETE CBC AUTOMATED: CPT | Performed by: PHYSICIAN ASSISTANT

## 2024-01-31 PROCEDURE — 99233 SBSQ HOSP IP/OBS HIGH 50: CPT | Performed by: ANESTHESIOLOGY

## 2024-01-31 PROCEDURE — 85730 THROMBOPLASTIN TIME PARTIAL: CPT | Performed by: PHYSICIAN ASSISTANT

## 2024-01-31 PROCEDURE — NC001 PR NO CHARGE: Performed by: ANESTHESIOLOGY

## 2024-01-31 RX ORDER — ENOXAPARIN SODIUM 100 MG/ML
40 INJECTION SUBCUTANEOUS
Status: DISCONTINUED | OUTPATIENT
Start: 2024-01-31 | End: 2024-02-02 | Stop reason: HOSPADM

## 2024-01-31 RX ORDER — ENOXAPARIN SODIUM 100 MG/ML
40 INJECTION SUBCUTANEOUS
Status: DISCONTINUED | OUTPATIENT
Start: 2024-02-01 | End: 2024-01-31

## 2024-01-31 RX ADMIN — CEFAZOLIN SODIUM 1000 MG: 1 SOLUTION INTRAVENOUS at 04:25

## 2024-01-31 RX ADMIN — LEVETIRACETAM 750 MG: 750 TABLET, FILM COATED ORAL at 08:54

## 2024-01-31 RX ADMIN — ACETAMINOPHEN 975 MG: 325 TABLET, FILM COATED ORAL at 13:17

## 2024-01-31 RX ADMIN — PANTOPRAZOLE SODIUM 40 MG: 40 TABLET, DELAYED RELEASE ORAL at 06:14

## 2024-01-31 RX ADMIN — DOCUSATE SODIUM 100 MG: 100 CAPSULE, LIQUID FILLED ORAL at 17:21

## 2024-01-31 RX ADMIN — HYDRALAZINE HYDROCHLORIDE 25 MG: 25 TABLET, FILM COATED ORAL at 00:00

## 2024-01-31 RX ADMIN — HYDRALAZINE HYDROCHLORIDE 25 MG: 25 TABLET, FILM COATED ORAL at 21:08

## 2024-01-31 RX ADMIN — SENNOSIDES 8.6 MG: 8.6 TABLET, FILM COATED ORAL at 08:54

## 2024-01-31 RX ADMIN — OXYCODONE HYDROCHLORIDE AND ACETAMINOPHEN 500 MG: 500 TABLET ORAL at 17:21

## 2024-01-31 RX ADMIN — ENOXAPARIN SODIUM 40 MG: 40 INJECTION SUBCUTANEOUS at 21:08

## 2024-01-31 RX ADMIN — DEXAMETHASONE 4 MG: 4 TABLET ORAL at 13:17

## 2024-01-31 RX ADMIN — OXYCODONE HYDROCHLORIDE AND ACETAMINOPHEN 500 MG: 500 TABLET ORAL at 08:53

## 2024-01-31 RX ADMIN — DOCUSATE SODIUM 100 MG: 100 CAPSULE, LIQUID FILLED ORAL at 08:54

## 2024-01-31 RX ADMIN — LEVETIRACETAM 750 MG: 750 TABLET, FILM COATED ORAL at 21:08

## 2024-01-31 RX ADMIN — HYDRALAZINE HYDROCHLORIDE 25 MG: 25 TABLET, FILM COATED ORAL at 06:14

## 2024-01-31 RX ADMIN — FLUOXETINE HYDROCHLORIDE 20 MG: 20 CAPSULE ORAL at 09:59

## 2024-01-31 RX ADMIN — LEVOTHYROXINE SODIUM 75 MCG: 75 TABLET ORAL at 06:14

## 2024-01-31 RX ADMIN — ACETAMINOPHEN 975 MG: 325 TABLET, FILM COATED ORAL at 06:13

## 2024-01-31 RX ADMIN — ACETAMINOPHEN 975 MG: 325 TABLET, FILM COATED ORAL at 21:08

## 2024-01-31 RX ADMIN — CEFAZOLIN SODIUM 1000 MG: 1 SOLUTION INTRAVENOUS at 13:16

## 2024-01-31 RX ADMIN — Medication 20000 UNITS: at 08:53

## 2024-01-31 RX ADMIN — ZINC SULFATE 220 MG (50 MG) CAPSULE 220 MG: CAPSULE at 08:54

## 2024-01-31 RX ADMIN — DEXAMETHASONE 4 MG: 4 TABLET ORAL at 06:14

## 2024-01-31 RX ADMIN — DEXAMETHASONE 4 MG: 4 TABLET ORAL at 17:21

## 2024-01-31 RX ADMIN — HYDRALAZINE HYDROCHLORIDE 25 MG: 25 TABLET, FILM COATED ORAL at 13:17

## 2024-01-31 RX ADMIN — DEXAMETHASONE 4 MG: 4 TABLET ORAL at 23:13

## 2024-01-31 NOTE — ASSESSMENT & PLAN NOTE
Multiple ill-defined foci of enhancement in the lumbar paraspinal musculature seen on CT scan    Lumbar spine MRI with paraspinal intramuscular masses. Imaging morphology most compatible with hemangiomas. Myxoma, sarcoma and metastases are considered less likely     Outpatient follow-up  Has appointment scheduled with oncology

## 2024-01-31 NOTE — OCCUPATIONAL THERAPY NOTE
Occupational Therapy Evaluation     Patient Name: Mary Ashby  Today's Date: 1/31/2024  Problem List  Principal Problem:    Meningioma (HCC)  Active Problems:    Hypothyroidism    Anxiety    Blood transfusion declined because patient is [employer]ist    Leukopenia    Lung nodules    Hepatic lesion    Abnormal CT scan, lumbar spine    Gallbladder polyp    Past Medical History  Past Medical History:   Diagnosis Date    Arthritis     BRCA1 negative     Disease of thyroid gland     Endometrial cancer (HCC) 1991    Thyroid disease     Uterus cancer (HCC)      Past Surgical History  Past Surgical History:   Procedure Laterality Date    CRANIOTOMY Left 1/30/2024    Procedure: IMAGE GUIDED LEFT PTERIONAL CRANIOTOMY FOR TUMOR;  Surgeon: Placido Lowery MD;  Location: [facility];  Service: Neurosurgery    HYSTERECTOMY      still has ovaries         01/31/24 1025   OT Last Visit   OT Visit Date 01/31/24   Note Type   Note type Re-Evaluation  (s/p L crani for resection of meningioma)   Pain Assessment   Pain Assessment Tool 0-10   Pain Score No Pain   Restrictions/Precautions   Weight Bearing Precautions Per Order No   Other Precautions Multiple lines   Home Living   Type of Home House   Home Layout One level   Bathroom Shower/Tub Walk-in shower   Bathroom Toilet Standard   Bathroom Equipment Shower chair;Commode   Home Equipment Walker;Cane   Additional Comments reports she plans to stay with dtr post d/c   Prior Function   Level of [place] Independent with ADLs;Independent with functional mobility;Independent with IADLS   Lives With Spouse;Family;Daughter   Receives Help From Family   IADLs Independent with driving;Independent with meal prep;Independent with medication management   Falls in the last 6 months 0   Lifestyle   Autonomy I with ADL's/iaDL's, no AD with functional mobiltiy +drives   Reciprocal Relationships supportive family however 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   Service to  "Others cares for spouse who has PD and parents (dad is almost blind from macular degeneration and mom is w/c bound)   Intrinsic Gratification active pta   Subjective   Subjective \"I feel good\"   ADL   Eating Assistance 7  Independent   Grooming Assistance 5  Supervision/Setup   UB Bathing Assistance 5  Supervision/Setup   LB Bathing Assistance 5  Supervision/Setup   UB Dressing Assistance 5  Supervision/Setup   LB Dressing Assistance 5  Supervision/Setup   Toileting Assistance  5  Supervision/Setup   Bed Mobility   Supine to Sit 5  Supervision   Transfers   Sit to Stand 5  Supervision   Stand to Sit 5  Supervision   Functional Mobility   Functional Mobility 5  Supervision   Balance   Static Sitting Good   Dynamic Sitting Fair +   Static Standing Fair   Dynamic Standing Fair   Ambulatory Fair -   Activity Tolerance   Activity Tolerance Patient tolerated treatment well   Medical Staff Made Aware PT present for co-eval 2* medical complexity, comorbidities and limited overall tolerance to activities   RUE Assessment   RUE Assessment WFL   LUE Assessment   LUE Assessment WFL   Cognition   Overall Cognitive Status WFL   Assessment   Limitation Decreased endurance;Decreased self-care trans;Decreased high-level ADLs   Prognosis Good   Assessment Pt is a 63 y.o. female who was admitted to [facility] on 1/24/2024 with Meningioma (HCC) . Patient  has a past medical history of Arthritis, BRCA1 negative, Disease of thyroid gland, Endometrial cancer (HCC), Thyroid disease, and Uterus cancer (HCC).  At baseline pt was completing adls and mobility independently - I iadls . Pt lives with spouse in 1 story home - reports she plans to stay with dtr post d/c . Currently pt requires sba for overall ADLS and sba for functional mobility/transfers. Pt currently presents with impairments in the following categories -limited home support, difficulty performing IADLS , and environment activity tolerance, endurance, and standing " balance/tolerance. These impairments, as well as pt's fatigue, decreased caregiver support, and risk for falls  limit pt's ability to safely engage in all baseline areas of occupation, includingfunctional mobility/transfers, community mobility, laundry , driving, house maintenance, medication management, meal prep, cleaning, social participation , and leisure activities however has supportive family who are able to provide assist prn - has access to any necessary DME -  From OT standpoint, recommend Level IV resources upon D/C. No further acute OT needs indicated at this time - Anticipate d/c home with family support when medically cleared - d/c from caseload   Goals   Patient Goals go home   Plan   OT Frequency Eval only   Discharge Recommendation   Rehab Resource Intensity Level, OT No post-acute rehabilitation needs   AM-PAC Daily Activity Inpatient   Lower Body Dressing 4   Bathing 4   Toileting 4   Upper Body Dressing 4   Grooming 4   Eating 4   Daily Activity Raw Score 24   Daily Activity Standardized Score (Calc for Raw Score >=11) 57.54   AM-PAC Applied Cognition Inpatient   Following a Speech/Presentation 4   Understanding Ordinary Conversation 4   Taking Medications 4   Remembering Where Things Are Placed or Put Away 4   Remembering List of 4-5 Errands 4   Taking Care of Complicated Tasks 4   Applied Cognition Raw Score 24   Applied Cognition Standardized Score 62.21   End of Consult   Education Provided Yes   Patient Position at End of Consult Bedside chair;Bed/Chair alarm activated;All needs within reach   Nurse Communication Nurse aware of consult     The patient's raw score on the AM-PAC Daily Activity Inpatient Short Form is 24. A raw score of greater than or equal to 19 suggests the patient may benefit from discharge to home. Please refer to the recommendation of the Occupational Therapist for safe discharge planning.        Julisa Bob

## 2024-01-31 NOTE — ASSESSMENT & PLAN NOTE
Left frontal meningioma, anterior left middle cranial fossa; likely focal-onset, retained awareness seizures given description of aphasia/apraxia episodes - patient reports these episodes stopped since starting Keppra  Patient presented with word finding difficulties intermittently for 5 years, increasing over the past month  MRI (1/23): 4.3 cm probable meningioma in the anterior aspect of the left middle cranial fossa with surrounding edema  S/p cerebral angiogram and embolization of L MMA on 1/29/2024  S/p resection on 1/30/2024  MARYSE drain removed on 1/31/2024 at the bedside  On Keppra 750 mg p.o. every 12 hours, Decadron wean  Home ASA held till cleared by neurosurgery  Neurosurgery following - input appreciated

## 2024-01-31 NOTE — PROGRESS NOTES
[facility]  Progress Note  Name: Mary Ashby I  MRN: [id number]  Unit/Bed#: ICU 04 I Date of Admission: 1/24/2024   Date of Service: 1/31/2024 I Hospital Day: 7    Assessment/Plan   * Meningioma (HCC)  Assessment & Plan  POD#1 s/p crani for L frontal meningioma (EM 1/30)  PPD#2 - s/p cerebral angiogram and embo of the L MMA (EM 1/29)     Patient with left frontal meningioma, presented with word finding difficulties intermittently for 5 years, increasing  for >1 month    Imaging reviewed personally and by attending. Final results as below  MRI 1/30/24: Expected postop changes. Decreased vasogenic edema within the left temporal lobe extending to the ipsilateral subinsular and capsular regions. Persistent 0.7 cm rightward midline shift.  MRI 1/23/24: 4.3 cm probable meningioma in anterior aspect of left middle cranial fossa with surrounding edema    Plan  Ancef q8 for 3 doses  Continue regular neurologic checks   Head MARYSE: 305  Decadron taper  Keppra 750 bid for seizure ppx  Ongoing medical management per primary team   Pain control per primary  Eval and mobilize per PT/OT  Hydralazine q8, hold for SBP <110  DVT PPX: SCDs, hold DVT ppx until drain out   MOCA pre-op 24/30    Neurosurgery will continue to follow. Please call with any questions or concerns.             Subjective/Objective     Chief Complaint: None    Subjective: Patient seen and examined at bedside during rounds.  Said she has some jaw pain and had some trouble eating last night.  She denies having a headache.  She also had some transient blurry vision yesterday evening that is improving.  She is able to read the time on the clock and able to read the date on the board in her room.    Objective: Patient of stated age lying in bed no acute distress.     I/O         01/29 0701 01/30 0700 01/30 0701 01/31 0700 01/31 0701 02/01 0700    P.O. 600 360     I.V. (mL/kg) 358.8 (5.6) 1866.3 (29.1)     IV Piggyback  150   "   Total Intake(mL/kg) 958.8 (15) 2376.3 (37.1)     Urine (mL/kg/hr) 1550 (1) 3020 (2)     Drains  305     Stool 0      Blood  100     Total Output 1550 3425     Net -591.3 -1048.8            Unmeasured Urine Occurrence 1 x      Unmeasured Stool Occurrence 2 x              Invasive Devices       Peripheral Intravenous Line  Duration             Peripheral IV 01/30/24 Left Hand 1 day    Peripheral IV 01/30/24 Right Wrist 1 day              Arterial Line  Duration             Arterial Line 01/30/24 Left Radial 1 day              Drain  Duration             Closed/Suction Drain Left Head Bulb 10 Fr. <1 day                    Physical Exam:  Vitals: Blood pressure 121/61, pulse 68, temperature 98.6 °F (37 °C), temperature source Oral, resp. rate 14, height 5' 2\" (1.575 m), weight 64.1 kg (141 lb 5 oz), SpO2 97%, not currently breastfeeding.,Body mass index is 25.85 kg/m².    Hemodynamic Monitoring: MAP: Arterial Line MAP (mmHg): 96 mmHg    General appearance:  Appears stated age  Head: Normocephalic head wrap on. MARYSE with ss output  Eyes: EOMI, PERRL  Neck: supple, symmetrical, trachea midline   Lungs: non labored breathing  Heart: regular heart rate  Neurologic:   Mental status: Alert, oriented x3, thought content appropriate  Cranial nerves: grossly intact (Cranial nerves II-XII)  Sensory: normal to light touch throughout  Motor: moving all extremities without focal weakness, strength 5/5 throughout    Lab Results:  Results from last 7 days   Lab Units 01/31/24 0534 01/30/24 1436 01/29/24  0454   WBC Thousand/uL 16.34* 18.51* 10.10   HEMOGLOBIN g/dL 14.6 14.1 13.9   HEMATOCRIT % 43.6 41.7 42.0   PLATELETS Thousands/uL 219 200 207   MONO PCT %  --   --  6   EOS PCT %  --   --  0     Results from last 7 days   Lab Units 01/31/24  0534 01/30/24  1436 01/29/24  0454 01/25/24  0603   POTASSIUM mmol/L 4.0 4.0 4.0 4.0   CHLORIDE mmol/L 102 104 103 104   CO2 mmol/L 26 24 25 27   BUN mg/dL 14 15 18 15   CREATININE mg/dL 0.58* " "0.58* 0.53* 0.53*   CALCIUM mg/dL 8.9 8.2* 9.1 9.5   ALK PHOS U/L  --   --   --  65   ALT U/L  --   --   --  22   AST U/L  --   --   --  19     Results from last 7 days   Lab Units 01/31/24  0534   MAGNESIUM mg/dL 2.1     Results from last 7 days   Lab Units 01/31/24  0534   PHOSPHORUS mg/dL 3.9     Results from last 7 days   Lab Units 01/31/24  0534 01/29/24  0454 01/25/24  0603   INR  1.09 1.10  --    PTT seconds 28 28 37     No results found for: \"TROPONINT\"  ABG:No results found for: \"PHART\", \"PZI3ZVV\", \"PO2ART\", \"JJE8JKV\", \"A1QIKRAH\", \"BEART\", \"SOURCE\"    Imaging Studies: I have personally reviewed pertinent reports and I have personally reviewed pertinent films in PACS    EKG, Pathology, and Other Studies: I have personally reviewed pertinent reports.      VTE Pharmacologic Prophylaxis: Venodyne contraindicated due to recent surgery    VTE Mechanical Prophylaxis: sequential compression device    PLEASE NOTE:  This encounter may have been completed utilizing the [employer] Direct Speech Voice Recognition Software. Grammatical errors, random word insertions, pronoun errors and incomplete sentences are occasional consequences of the system due to software limitations, ambient noise and hardware issues.These may be missed by proof reading prior to affixing electronic signature. Any questions or concerns about the content, text or information contained within the body of this dictation should be directly addressed to the advanced practitioner or physician for clarification.    "

## 2024-01-31 NOTE — PLAN OF CARE
"  Problem: PHYSICAL THERAPY ADULT  Goal: Performs mobility at highest level of function for planned discharge setting.  See evaluation for individualized goals.  Description: Treatment/Interventions: Functional transfer training, LE strengthening/ROM, Elevations, Therapeutic exercise, Endurance training, Patient/family training, Equipment eval/education, Bed mobility, Gait training, Continued evaluation, Spoke to nursing, Spoke to case management, Spoke to advanced practitioner, OT  Equipment Recommended:  (TBD)       See flowsheet documentation for full assessment, interventions and recommendations.  Note: Prognosis: Good  Problem List: Decreased endurance, Impaired balance, Decreased mobility, Impaired vision, Decreased skin integrity, Pain  Assessment: Pt is 63 y.o. female seen for PT evaluation s/p admit to [facility] on 1/24/2024  w/ dx of  Meningioma (HCC).  Pt  is IMAGE GUIDED LEFT PTERIONAL CRANIOTOMY FOR TUMOR;  Surgeon: Placido Lowery MD on 1/30/24  Pt  has a past medical history of Arthritis, BRCA1 negative, Disease of thyroid gland, Endometrial cancer (HCC), Thyroid disease, and Uterus cancer (HCC).  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 Spouse and parents currently being taken care of by J[employer] friends. Plan for pt to d/[place] to ranch home (daughters home w/ few IDA) Pt is normally functionally indep ; 0 falls ; does not use AD (+) - daughter able to assist on d/c for pt to recover.  Currently,  pt  is requiring CGA A for bed skills; CGA> S for functional transfers and  CGA> close S for ambulation w/ +120 w/ mild HA/ dizziness w.o overt LOB.   Pt presents functioning below baseline and currently w/ overall mobility deficits 2* to: post op pain; headache and mild dizziness w/ stable vitals; decreased endurance; decreased activity tolerance;  bed/ chair alarms; multiple lines;  visual impairments;  (blurry) but \"improving\"  per pt\"  (Please find " additional objective findings from PT assessment regarding body systems outlined above.) Pt will continue to benefit from skilled PT interventions to address stated impairments; to maximize functional potential; for ongoing pt/ family training; and DME needs.  PT is recommending PT Resource Intensity Level  3 vs no needs. Anticipate pt will make quick progress and d/c home w/ family support as planned and OPPT vs no needs pending progress-    PT will follow for STG as outlined on eval. Pt/ family agreeable to PT POC and goals as stated.  Barriers to Discharge: None     Rehab Resource Intensity Level, PT: III (Minimum Resource Intensity) (vs none see assessment)    See flowsheet documentation for full assessment.

## 2024-01-31 NOTE — ASSESSMENT & PLAN NOTE
POD#1 s/p crani for L frontal meningioma (EM 1/30)  PPD#2 - s/p cerebral angiogram and embo of the L MMA (EM 1/29)     Patient with left frontal meningioma, presented with word finding difficulties intermittently for 5 years, increasing  for >1 month    Imaging reviewed personally and by attending. Final results as below  MRI 1/30/24: Expected postop changes. Decreased vasogenic edema within the left temporal lobe extending to the ipsilateral subinsular and capsular regions. Persistent 0.7 cm rightward midline shift.  MRI 1/23/24: 4.3 cm probable meningioma in anterior aspect of left middle cranial fossa with surrounding edema    Plan  Ancef q8 for 3 doses  Continue regular neurologic checks   Head MARYSE: 305  Decadron taper  Keppra 750 bid for seizure ppx  Ongoing medical management per primary team   Pain control per primary  Eval and mobilize per PT/OT  Hydralazine q8, hold for SBP <110  DVT PPX: SCDs, hold DVT ppx until drain out   MOCA pre-op 24/30    Neurosurgery will continue to follow. Please call with any questions or concerns.

## 2024-01-31 NOTE — QUICK NOTE
Patient was seen by heme-onc during this admission for clearance before craniotomy for meningioma.     Initial PTT mildly elevated repeat is WNL    Craniotomy was done on 1/30/2024 by neurosurgery    Patient is due for weakness and would not accept blood transfusion, gladly her hemoglobin remained stable at 14.6.     Incidental findings likely benign but still need further monitoring/evaluation on the CT chest abdomen pelvis 1/20/2024    See consult/progress notes for more details    At the moment inpatient hematology oncology will sign off, patient to follow-up outpatient with Dr. De Los Santos, appointment scheduled on March 5, 2024.    Appreciate the consultation please contact with any questions or concerns.

## 2024-01-31 NOTE — PROGRESS NOTES
[facility]  Progress Note: Critical Care  Name: Mary Ashby 63 y.o. female I MRN: [id number]  Unit/Bed#: ICU 04 I Date of Admission: 1/24/2024   Date of Service: 1/31/2024 I Hospital Day: 7    Assessment/Plan   Neuro:   Diagnosis: Left frontal meningioma, anterior left middle cranial fossa; likely focal-onset, retained awareness seizures given description of aphasia/apraxia episodes - patient reports these episodes stopped since starting Keppra  Patient presented with word finding difficulties intermittently for 5 years, increasing over the past month  MRI (1/23): 4.3 cm probable meningioma in the anterior aspect of the left middle cranial fossa with surrounding edema  Plan:   Continue decadron wean  Continue Keppra 750 twice daily  Now POD 1 S/p planned resection w/o complication, PPD 2 s/p angio/embo - appreciate neurosurgery management  Diagnosis: 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 CAM-ICU. Delirium precautions. Frequent re-orientation as able. Regulate sleep/wake cycle.   Pain control        CV:   Diagnosis: No critical issues  Plan: Continuous cardiopulmonary monitoring.  Maintain MAP > 65.  Monitor resuscitative endpoints.     Pulm:  Diagnosis: Lung nodules  CT scan with multiple bilateral pulmonary nodules measuring up to 4 mm  Plan: Follow-up CT chest in 3 months to rule out metastatic disease  Continuous pulse ox.  Pulmonary toilet.  Elevate HOB.  Maintain O2 sat greater than 92%  Supplemental O2: If needed     GI:   Diagnosis: Gallbladder polyp, hepatic lesions  CT demonstrating multiple hypoattenuating hepatic lesions-MRI findings most consistent with benign hepatic cysts  MRI demonstrating gallbladder polyps, most consistent  with a benign finding  Plan: Outpatient workup including RUQ US  Plan: Bowel regimen:  prn. Monitor stool output and quality.  GI prophylaxis: If indicated     :   No critical issues  Plan: Strict I/O. Monitor urine output and renal indices. Avoid nephrotoxins.          F/E/N:   F: Fluid resuscitation prn.  E: Monitor and replete electrolytes for ionized calcium >1.12, Mg >2, Phos >3, K >4.  N: Regular diet         Heme/Onc:   Diagnosis:,, Lung nodules, gallbladder polyp, paraspinal masses, meningioma  Plan: Outpatient follow-up  Stable H&H.  Monitor Hgb and transfuse for Hgb <7 or based on hemodynamics if actively bleeding. Monitor platelets.  VTE prophylaxis: pharm ppx when cleared by neurosurgery. Sequential compression devices and/or foot pumps.        Endo:   Diagnosis: Hypothyroidism, normal TSH  Plan: Continue levothyroxine  Monitor blood glucose for goal 140-180.         ID:   No critical issues  Monitor fever curve and WBC. Maintain normothermia. Daily CHG bath, Peridex oral rinse, and nasal antiseptic while in ICU.  Tylenol prn for fever management        MSK/Skin:   Frequent turning and repositioning. Pressure injury prevention. Monitor for skin breakdown. PT/OT when appropriate. Encourage OOBTC and ambulation when appropriate. Local wound care prn. Forced warm air blanket if needed for hypothermia.      Disposition: Critical care    ICU Core Measures     A: Assess, Prevent, and Manage Pain Has pain been assessed? Yes  Need for changes to pain regimen? No   B: Both SAT/SAT  N/A   C: Choice of Sedation RASS Goal: N/A patient not on sedation  Need for changes to sedation or analgesia regimen? NA   D: Delirium CAM-ICU: Negative   E: Early Mobility  Plan for early mobility? Yes   F: Family Engagement Plan for family engagement today? Yes         Prophylaxis:  VTE Contraindicated secondary to: Surgery   Stress Ulcer  covered bypantoprazole (PROTONIX) EC tablet 40 mg [[id number]]        Significant 24hr Events      24hr events: Patient is now POD 1 s/p meningioma resection, no complications observed, neurologically intact aside from intermittent left eye blurring.  No diplopia.  Frontal headache slowly improving from postop       Subjective     Review of Systems   Constitutional:  Positive for fatigue. Negative for fever.   HENT:  Negative for trouble swallowing.    Eyes:  Positive for visual disturbance.   Respiratory:  Negative for shortness of breath.    Cardiovascular:  Negative for chest pain.   Gastrointestinal:  Negative for diarrhea, nausea and vomiting.   Neurological:  Positive for light-headedness and headaches. Negative for dizziness, tremors, weakness and numbness.   Psychiatric/Behavioral:  Negative for sleep disturbance.       Objective                            Vitals I/O      Most Recent Min/Max in 24hrs   Temp 98.6 °F (37 °C) Temp  Min: 97.6 °F (36.4 °C)  Max: 98.6 °F (37 °C)   Pulse 68 Pulse  Min: 60  Max: 74   Resp 14 Resp  Min: 12  Max: 31   /61 BP  Min: 98/48  Max: 168/77   O2 Sat 97 % SpO2  Min: 96 %  Max: 100 %      Intake/Output Summary (Last 24 hours) at 1/31/2024 0742  Last data filed at 1/31/2024 0600  Gross per 24 hour   Intake 2376.25 ml   Output 3425 ml   Net -1048.75 ml       Diet Regular; Regular House    Invasive Monitoring           Physical Exam   Physical Exam  Eyes:      Extraocular Movements: Extraocular movements intact.      Pupils: Pupils are equal, round, and reactive to light.   HENT:      Head: Normocephalic.      Comments: Will drain in place, serosang; left sided jaw swelling, subtle left eye swelling present, improving subjective blurring     Nose: No congestion or rhinorrhea.      Mouth/Throat:      Mouth: Mucous membranes are moist.      Pharynx: No oropharyngeal exudate.   Neck:      Vascular: No JVD.   Cardiovascular:      Rate and Rhythm: Normal rate and regular rhythm.   Musculoskeletal:      Right lower leg: No edema.      Left lower leg: No edema.   Abdominal:  General: Bowel sounds are normal. There is no distension.      Palpations: Abdomen is soft.      Tenderness: There is no abdominal tenderness.   Constitutional:       General: She is not in acute distress.     Appearance: She is well-developed and well-nourished.   Pulmonary:      Effort: Pulmonary effort is normal. No respiratory distress.      Breath sounds: Normal breath sounds. No stridor. No wheezing or rhonchi.   Psychiatric:         Speech: Speech is not no expressive aphasia.   Neurological:      General: No focal deficit present.      Mental Status: She is alert and oriented to person, place and time.      Cranial Nerves: No dysarthria or facial asymmetry.      Sensory: No sensory deficit.      Motor: Strength full and intact in all extremities. No pronator drift or motor deficit.          Diagnostic Studies      EKG: None available for review  Imaging: MRI LS, abdomen pelvis, brain, CT, IR cerebral angioI have personally reviewed pertinent reports.   and I have personally reviewed pertinent films in PACS     Medications:  Scheduled PRN   acetaminophen, 975 mg, Q8H KEELY  ascorbic acid, 500 mg, BID  cefazolin, 1,000 mg, Q8H  dexamethasone, 4 mg, Q6H KEELY   Followed by  [START ON 2/1/2024] dexamethasone, 4 mg, Q8H KEELY   Followed by  [START ON 2/3/2024] dexamethasone, 2 mg, Q6H KEELY   Followed by  [START ON 2/5/2024] dexamethasone, 2 mg, Q8H KEELY   Followed by  [START ON 2/7/2024] dexamethasone, 2 mg, Q12H KEELY   Followed by  [START ON 2/9/2024] dexamethasone, 2 mg, Q24H KEELY  docusate sodium, 100 mg, BID  FLUoxetine, 20 mg, Daily  hydrALAZINE, 25 mg, Q8H KEELY  levETIRAcetam, 750 mg, Q12H KEELY  levothyroxine, 75 mcg, Early Morning  pantoprazole, 40 mg, Early Morning  senna, 1 tablet, Daily  vitamin A, 20,000 Units, Daily  zinc sulfate, 220 mg, Daily      acetaminophen, 650 mg, Q6H PRN  bisacodyl, 10 mg, Daily PRN  calcium carbonate, 1,000 mg, Daily PRN  hydrALAZINE, 10 mg, Q4H PRN  HYDROmorphone, 0.2 mg, Q4H  PRN  ondansetron, 4 mg, Q6H PRN  oxyCODONE, 2.5 mg, Q4H PRN   Or  oxyCODONE, 5 mg, Q4H PRN       Continuous          Labs:    CBC    Recent Labs     01/30/24  1436 01/31/24  0534   WBC 18.51* 16.34*   HGB 14.1 14.6   HCT 41.7 43.6    219     BMP    Recent Labs     01/30/24  1436 01/31/24  0534   SODIUM 139 138   K 4.0 4.0    102   CO2 24 26   AGAP 11 10   BUN 15 14   CREATININE 0.58* 0.58*   CALCIUM 8.2* 8.9       Coags    Recent Labs     01/31/24  0534   INR 1.09   PTT 28        Additional Electrolytes  Recent Labs     01/31/24  0534   MG 2.1   PHOS 3.9          Blood Gas    No recent results  No recent results LFTs  No recent results    Infectious  No recent results  Glucose  Recent Labs     01/30/24  1436 01/31/24  0534   GLUC 144* 123                   Brit No MD

## 2024-01-31 NOTE — PLAN OF CARE
Problem: PAIN - ADULT  Goal: Verbalizes/displays adequate comfort level or baseline comfort level  Description: Interventions:  - Encourage patient to monitor pain and request assistance  - Assess pain using appropriate pain scale  - Administer analgesics based on type and severity of pain and evaluate response  - Implement non-pharmacological measures as appropriate and evaluate response  - Consider cultural and social influences on pain and pain management  - Notify physician/advanced practitioner if interventions unsuccessful or patient reports new pain  Outcome: Progressing     Problem: NEUROSENSORY - ADULT  Goal: Achieves stable or improved neurological status  Description: INTERVENTIONS  - Monitor and report changes in neurological status  - Monitor vital signs such as temperature, blood pressure, glucose, and any other labs ordered   - Initiate measures to prevent increased intracranial pressure  - Monitor for seizure activity and implement precautions if appropriate      Outcome: Progressing  Goal: Remains free of injury related to seizures activity  Description: INTERVENTIONS  - Maintain airway, patient safety  and administer oxygen as ordered  - Monitor patient for seizure activity, document and report duration and description of seizure to physician/advanced practitioner  - If seizure occurs,  ensure patient safety during seizure  - Reorient patient post seizure  - Seizure pads on all 4 side rails  - Instruct patient/family to notify RN of any seizure activity including if an aura is experienced  - Instruct patient/family to call for assistance with activity based on nursing assessment  - Administer anti-seizure medications if ordered    Outcome: Progressing  Goal: Achieves maximal functionality and self care  Description: INTERVENTIONS  - Monitor swallowing and airway patency with patient fatigue and changes in neurological status  - Encourage and assist patient to increase activity and self care.   -  Encourage visually impaired, hearing impaired and aphasic patients to use assistive/communication devices  Outcome: Progressing

## 2024-01-31 NOTE — PHYSICAL THERAPY NOTE
PHYSICAL RE- THERAPY EVALUATION  NAME:  Mary Ashby  DATE: 01/31/24    AGE:   63 y.o.  Mrn:   [id number]  ADMIT DX:  Vasogenic brain edema (HCC) [G93.6]    Past Medical History:   Diagnosis Date    Arthritis     BRCA1 negative     Disease of thyroid gland     Endometrial cancer (HCC) 1991    Thyroid disease     Uterus cancer (HCC)      Past Surgical History:   Procedure Laterality Date    CRANIOTOMY Left 1/30/2024    Procedure: IMAGE GUIDED LEFT PTERIONAL CRANIOTOMY FOR TUMOR;  Surgeon: Placido Lowery MD;  Location: [facility];  Service: Neurosurgery    HYSTERECTOMY      still has ovaries       Length Of Stay: 7  PHYSICAL THERAPY EVALUATION :    01/31/24 1037   PT Last Visit   PT Visit Date 01/31/24   Note Type   Note type Re-Evaluation  ((s/p crani for resection) and tx)   Education   Education Provided Yes   Pain Assessment   Pain Assessment Tool 0-10   Pain Score 1   Pain Location/Orientation Location: Head   Restrictions/Precautions   Weight Bearing Precautions Per Order No   Home Living   Additional Comments see IE   Prior Function   Level of [place] Independent with ADLs;Independent with functional mobility;Independent with IADLS   Lives With Spouse;Family;Daughter   Receives Help From Family   IADLs Independent with driving;Independent with meal prep;Independent with medication management   Falls in the last 6 months 0   Comments see IE   Cognition   Overall Cognitive Status WFL   RUE Assessment   RUE Assessment WFL   LUE Assessment   LUE Assessment WFL   RLE Assessment   RLE Assessment WFL   LLE Assessment   LLE Assessment WFL   Vision-Basic Assessment   Patient Visual Report Other (Comment)  (pt reports blurry vision is improving)   Coordination   Movements are Fluid and Coordinated 1   Sensation WFL   Finger to Nose & Finger to Finger  Intact   Light Touch   RLE Light Touch Grossly intact   LLE Light Touch Grossly intact   Sharp/Dull   RLE Sharp/Dull Grossly intact   LLE Sharp/Dull Grossly intact    Proprioception   RLE Proprioception Grossly intact   LLE Proprioception Grossly Intact   Bed Mobility   Supine to Sit 5  Supervision   Transfers   Sit to Stand 5  Supervision   Stand to Sit 5  Supervision   Additional Comments CGA > supervision + VC for safety   Ambulation/Elevation   Gait pattern Decreased foot clearance;Inconsistent melany   Gait Assistance 4  Minimal assist  (CGA> S)   Assistive Device Rolling walker   Distance 50' w/ mild HA/ dizziness w.o overt LOB   Stair Management Assistance Not tested   Balance   Static Sitting Good   Dynamic Sitting Fair +   Static Standing Fair -   Dynamic Standing Fair -   Ambulatory Poor +   Endurance Deficit   Endurance Deficit Yes   Activity Tolerance   Activity Tolerance Patient tolerated treatment well   Medical Staff Made Aware Pt was seen in conjunction w/ OT 2* unstable presentation; medical complexity; post op monitoring new precautions/ limitations + limited activity tolerance and regression from baseline functional mobility.   Assessment   Prognosis Good   Problem List Decreased endurance;Impaired balance;Decreased mobility;Impaired vision;Decreased skin integrity;Pain   Assessment Pt is 63 y.o. female seen for PT RE-  evaluation s/p admit to [facility] on 1/24/2024  w/ dx of  Meningioma (HCC).  Pt  is IMAGE GUIDED LEFT PTERIONAL CRANIOTOMY FOR TUMOR;  Surgeon: Placido Lowery MD on 1/30/24  Pt  has a past medical history of Arthritis, BRCA1 negative, Disease of thyroid gland, Endometrial cancer (HCC), Thyroid disease, and Uterus cancer (HCC).  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 Spouse and parents currently being taken care of by J[employer] friends. Plan for pt to d/c to ranch home (daughters home w/ few IDA) Pt is normally functionally indep ; 0 falls ; does not use AD (+) - daughter able to assist on d/c for pt to recover.  Currently,  pt  is requiring CGA A for bed skills; CGA> S for functional  "transfers and  CGA> close S for ambulation w/ +120 w/ mild HA/ dizziness w.o overt LOB.   Pt presents functioning below baseline and currently w/ overall mobility deficits 2* to: post op pain; headache and mild dizziness w/ stable vitals; decreased endurance; decreased activity tolerance;  bed/ chair alarms; multiple lines;  visual impairments;  (blurry) but \"improving\"  per pt\"  (Please find additional objective findings from PT assessment regarding body systems outlined above.) Pt will continue to benefit from skilled PT interventions to address stated impairments; to maximize functional potential; for ongoing pt/ family training; and DME needs.  PT is recommending PT Resource Intensity Level  3 vs no needs. Anticipate pt will make quick progress and d/c home w/ family support as planned and OPPT vs no needs pending progress-    PT will follow for STG as outlined on eval. Pt/ family agreeable to PT POC and goals as stated.   Goals   Patient Goals feel better and go home   STG Expiration Date 02/14/24   Short Term Goal #1 In 14 days pt will:  Complete bed mobility skills with indep to facilitate safe return to previous living environment and decrease burden on caregivers.  Perform all functional transfers with indep  to facilitate safe return to previous living environment.    Ambulate  with least restrictive AD indep/ MI > 350' without LOB and stable vitals for safe ambulation in home/ community environment.   Complete stair training up/ down 6 step/s w/  indep/ MI for safe access to previous living environment and to increase community access.    Improve balance by 1 grade in order to decrease fall risk.     Actively participate w/ PT for ongoing pt and family education; DME needs and D/C planning to promote highest level of function in least restrictive environment.   PT Treatment Day 1   Plan   Treatment/Interventions Functional transfer training;LE strengthening/ROM;Elevations;Therapeutic " "exercise;Endurance training;Patient/family training;Equipment eval/education;Bed mobility;Gait training;Continued evaluation;Spoke to nursing;Spoke to case management;Spoke to advanced practitioner;OT   PT Frequency 3-5x/wk   Discharge Recommendation   Rehab Resource Intensity Level, PT III (Minimum Resource Intensity)  (vs none see assessment)   Equipment Recommended   (TBD)   AM-PAC Basic Mobility Inpatient   Turning in Flat Bed Without Bedrails 3   Lying on Back to Sitting on Edge of Flat Bed Without Bedrails 3   Moving Bed to Chair 3   Standing Up From Chair Using Arms 3   Walk in Room 3   Climb 3-5 Stairs With Railing 3   Basic Mobility Inpatient Raw Score 18   Basic Mobility Standardized Score 41.05   Highest Level Of Mobility   JH-HLM Goal 6: Walk 10 steps or more   JH-HLM Achieved 7: Walk 25 feet or more         PT Treatment session  ([id number]) : PT tx session following re-eval consisting of additional gait w/ RW 40+60 and S- and 30' w/ HHA. Pt tolerated well w/ improved balance and subjective report of \"vision improving\" w/ inc distances. Pt fatigued post session and set up in recliner w/ all needs in reach. Educated in fall prevention. Pt w/ good safety and understanding. PT will continue to follow and progress      The patient's AM-[facility] Basic Mobility Inpatient Short Form Raw Score is 18. A Raw score of greater than 16 suggests the patient may benefit from discharge to home. Please also refer to the recommendation of the Physical Therapist for safe discharge planning.                 "

## 2024-01-31 NOTE — PROGRESS NOTES
Critical Care Interval Transfer Note:       * Meningioma (HCC)  Assessment & Plan  Left frontal meningioma, anterior left middle cranial fossa; likely focal-onset, retained awareness seizures given description of aphasia/apraxia episodes - patient reports these episodes stopped since starting Keppra  Patient presented with word finding difficulties intermittently for 5 years, increasing over the past month  MRI (1/23): 4.3 cm probable meningioma in the anterior aspect of the left middle cranial fossa with surrounding edema  Plan:   Neurosurgery onboard, appreciate management  Drain removed 1/31   Continue decadron wean for edema  Continue Keppra 750 twice daily for seizure ppx  PPI for GI protection  Now POD 1 S/p planned resection w/o complication, PPD 2 s/p angio/embo - appreciate neurosurgery management  Continue neuro checks    Gallbladder polyp  Assessment & Plan  Abdominal MRI with Findings most compatible with gallbladder cholesterolosis/tiny polyps, typically benign.   Recommend confirmation with right upper quadrant ultrasound.   Outpatient workup    Abnormal CT scan, lumbar spine  Assessment & Plan  Multiple ill-defined foci of enhancement in the lumbar paraspinal musculature seen on CT scan    Lumbar spine MRI with paraspinal intramuscular masses. Imaging morphology most compatible with hemangiomas. Myxoma, sarcoma and metastases are considered less likely     Outpatient follow-up    Hepatic lesion  Assessment & Plan  Multiple hypoattenuating hepatic lesions seen on CT scan  Abdominal MRI consistent with tiny hepatic cysts, considered benign findings     Outpatient follow-up    Leukopenia  Assessment & Plan  Appears to be chronic.  Monitor for now    Blood transfusion declined because patient is Adventism  Assessment & Plan  Hematology consulted  Stable H&H  Continue to monitor    Anxiety  Assessment & Plan  Continue Prozac  Patient reported that she is the primary caregiver for her parents and  "her  and she is under a lot of stress at home    Hypothyroidism  Assessment & Plan  Continue levothyroxine  Normal TSH      HPI/hospital course    Per H&P: \"Mary Ashby is a 63 y.o. female with a PMH of hypothyroidism and depression who presents as a transfer from [facility] due to abnormal MRI.  Patient reported that for the past 5 years or so she has intermittent word finding difficulties.  Which has been getting gradually worse recently.  Reported that she is under a lot of stress because she is a primary caregiver of for her  who has Parkinson's disease and also her aging parents.  Recently she noticed that she went to the wrong to take her medications and put them in her mouth but she did not know what to do was staring at the water..  It lasted for about 30 seconds before she realized that she has to have the water er with the medication.And patient reported that the day before that she was in her parents house but she was not making much sense and her dad asked her what is happening  Due to the concern for worsening word finding difficulty patient was seen by his PCP.  PCP sent her for an MRI.  Patient noted to have meningioma with vasogenic edema and midline shift.  Case was discussed with neurosurgery and started on Keppra and Decadron.  Transferred to [facility].  Currently she denies any specific complaints.  Patient denies any trouble with ambulation.  No urinary or bladder incontinence.  No change in her vision \"    Patient was taken for angio and embolization day 1/29, and subsequent resection Tuesday 1/30.  Patient tolerated procedure well.  She had no immediate complications.  She was nonfocal on exam after her resection and had only intermittent left eye blurring which is improving.  Patient's drain was removed earlier today.  She had some mild bifrontal head pain which is improving.  She was stable to leave ICU 01/31/24         Please refer to " progress note from earlier today for full details.     Barriers to discharge:   S/p meningioma resection     Consults: IP CONSULT TO NEUROSURGERY  IP CONSULT TO HEMATOLOGY  IP CONSULT TO CASE MANAGEMENT    Recommended to review admission imaging for incidental findings and document in discharge navigator: Incidental findings have been documented in discharge navigator. Patient and/or family was informed and they expressed understanding.  - incidental findings documented prior to ICU admission     Discharge Plan: Anticipate discharge in 24-48 hrs to home.            Patient seen and evaluated by Critical Care today and deemed to be appropriate for transfer to Med Surg. Spoke to Dr. Cueva from [facility] to accept transfer. Critical care can be contacted via [employer] with any questions or concerns.

## 2024-02-01 LAB
ANION GAP SERPL CALCULATED.3IONS-SCNC: 9 MMOL/L
BASOPHILS # BLD MANUAL: 0 THOUSAND/UL (ref 0–0.1)
BASOPHILS NFR MAR MANUAL: 0 % (ref 0–1)
BUN SERPL-MCNC: 22 MG/DL (ref 5–25)
CALCIUM SERPL-MCNC: 8.6 MG/DL (ref 8.4–10.2)
CHLORIDE SERPL-SCNC: 101 MMOL/L (ref 96–108)
CO2 SERPL-SCNC: 25 MMOL/L (ref 21–32)
CREAT SERPL-MCNC: 0.54 MG/DL (ref 0.6–1.3)
EOSINOPHIL # BLD MANUAL: 0 THOUSAND/UL (ref 0–0.4)
EOSINOPHIL NFR BLD MANUAL: 0 % (ref 0–6)
ERYTHROCYTE [DISTWIDTH] IN BLOOD BY AUTOMATED COUNT: 12.9 % (ref 11.6–15.1)
GFR SERPL CREATININE-BSD FRML MDRD: 102 ML/MIN/1.73SQ M
GLUCOSE SERPL-MCNC: 123 MG/DL (ref 65–140)
HCT VFR BLD AUTO: 42.8 % (ref 34.8–46.1)
HGB BLD-MCNC: 14 G/DL (ref 11.5–15.4)
LYMPHOCYTES # BLD AUTO: 0.81 THOUSAND/UL (ref 0.6–4.47)
LYMPHOCYTES # BLD AUTO: 5 % (ref 14–44)
MCH RBC QN AUTO: 29.5 PG (ref 26.8–34.3)
MCHC RBC AUTO-ENTMCNC: 32.7 G/DL (ref 31.4–37.4)
MCV RBC AUTO: 90 FL (ref 82–98)
MONOCYTES # BLD AUTO: 0.41 THOUSAND/UL (ref 0–1.22)
MONOCYTES NFR BLD: 3 % (ref 4–12)
NEUTROPHILS # BLD MANUAL: 12.33 THOUSAND/UL (ref 1.85–7.62)
NEUTS SEG NFR BLD AUTO: 91 % (ref 43–75)
PLATELET # BLD AUTO: 195 THOUSANDS/UL (ref 149–390)
PLATELET BLD QL SMEAR: ADEQUATE
PMV BLD AUTO: 9.7 FL (ref 8.9–12.7)
POTASSIUM SERPL-SCNC: 4.3 MMOL/L (ref 3.5–5.3)
RBC # BLD AUTO: 4.75 MILLION/UL (ref 3.81–5.12)
RBC MORPH BLD: NORMAL
SODIUM SERPL-SCNC: 135 MMOL/L (ref 135–147)
VARIANT LYMPHS # BLD AUTO: 1 %
WBC # BLD AUTO: 13.55 THOUSAND/UL (ref 4.31–10.16)

## 2024-02-01 PROCEDURE — 85007 BL SMEAR W/DIFF WBC COUNT: CPT | Performed by: PSYCHIATRY & NEUROLOGY

## 2024-02-01 PROCEDURE — 80048 BASIC METABOLIC PNL TOTAL CA: CPT | Performed by: PSYCHIATRY & NEUROLOGY

## 2024-02-01 PROCEDURE — 97116 GAIT TRAINING THERAPY: CPT

## 2024-02-01 PROCEDURE — 97530 THERAPEUTIC ACTIVITIES: CPT

## 2024-02-01 PROCEDURE — 99024 POSTOP FOLLOW-UP VISIT: CPT | Performed by: NEUROLOGICAL SURGERY

## 2024-02-01 PROCEDURE — 99232 SBSQ HOSP IP/OBS MODERATE 35: CPT | Performed by: INTERNAL MEDICINE

## 2024-02-01 PROCEDURE — 85027 COMPLETE CBC AUTOMATED: CPT | Performed by: PSYCHIATRY & NEUROLOGY

## 2024-02-01 RX ADMIN — DOCUSATE SODIUM 100 MG: 100 CAPSULE, LIQUID FILLED ORAL at 17:12

## 2024-02-01 RX ADMIN — LEVOTHYROXINE SODIUM 75 MCG: 75 TABLET ORAL at 05:45

## 2024-02-01 RX ADMIN — ZINC SULFATE 220 MG (50 MG) CAPSULE 220 MG: CAPSULE at 09:06

## 2024-02-01 RX ADMIN — Medication 20000 UNITS: at 09:07

## 2024-02-01 RX ADMIN — ACETAMINOPHEN 975 MG: 325 TABLET, FILM COATED ORAL at 13:50

## 2024-02-01 RX ADMIN — HYDRALAZINE HYDROCHLORIDE 25 MG: 25 TABLET, FILM COATED ORAL at 21:05

## 2024-02-01 RX ADMIN — SENNOSIDES 8.6 MG: 8.6 TABLET, FILM COATED ORAL at 09:06

## 2024-02-01 RX ADMIN — LEVETIRACETAM 750 MG: 750 TABLET, FILM COATED ORAL at 21:04

## 2024-02-01 RX ADMIN — FLUOXETINE HYDROCHLORIDE 20 MG: 20 CAPSULE ORAL at 09:06

## 2024-02-01 RX ADMIN — ACETAMINOPHEN 975 MG: 325 TABLET, FILM COATED ORAL at 05:45

## 2024-02-01 RX ADMIN — DEXAMETHASONE 4 MG: 4 TABLET ORAL at 21:05

## 2024-02-01 RX ADMIN — OXYCODONE HYDROCHLORIDE AND ACETAMINOPHEN 500 MG: 500 TABLET ORAL at 09:06

## 2024-02-01 RX ADMIN — ACETAMINOPHEN 975 MG: 325 TABLET, FILM COATED ORAL at 21:04

## 2024-02-01 RX ADMIN — HYDRALAZINE HYDROCHLORIDE 25 MG: 25 TABLET, FILM COATED ORAL at 05:45

## 2024-02-01 RX ADMIN — OXYCODONE HYDROCHLORIDE AND ACETAMINOPHEN 500 MG: 500 TABLET ORAL at 17:12

## 2024-02-01 RX ADMIN — BISACODYL 10 MG: 10 SUPPOSITORY RECTAL at 10:00

## 2024-02-01 RX ADMIN — DEXAMETHASONE 4 MG: 4 TABLET ORAL at 13:50

## 2024-02-01 RX ADMIN — HYDRALAZINE HYDROCHLORIDE 25 MG: 25 TABLET, FILM COATED ORAL at 13:50

## 2024-02-01 RX ADMIN — DOCUSATE SODIUM 100 MG: 100 CAPSULE, LIQUID FILLED ORAL at 09:07

## 2024-02-01 RX ADMIN — PANTOPRAZOLE SODIUM 40 MG: 40 TABLET, DELAYED RELEASE ORAL at 05:45

## 2024-02-01 RX ADMIN — LEVETIRACETAM 750 MG: 750 TABLET, FILM COATED ORAL at 09:06

## 2024-02-01 RX ADMIN — DEXAMETHASONE 4 MG: 4 TABLET ORAL at 05:45

## 2024-02-01 RX ADMIN — ENOXAPARIN SODIUM 40 MG: 40 INJECTION SUBCUTANEOUS at 21:05

## 2024-02-01 NOTE — PLAN OF CARE
"  Problem: PHYSICAL THERAPY ADULT  Goal: Performs mobility at highest level of function for planned discharge setting.  See evaluation for individualized goals.  Description: Treatment/Interventions: Functional transfer training, LE strengthening/ROM, Elevations, Therapeutic exercise, Endurance training, Patient/family training, Equipment eval/education, Bed mobility, Gait training, Continued evaluation, Spoke to nursing, Spoke to case management, Spoke to advanced practitioner, OT  Equipment Recommended:  (TBD)       See flowsheet documentation for full assessment, interventions and recommendations.  Outcome: Adequate for Discharge  Note: Prognosis: Good  Problem List: Impaired vision (blurry vision)  Assessment: pt indep for bed skills and transfers; gait w/o AD > 500' total w/o difficulty + pt ambualtes around room w/o diffiuclty or LOB despit ongoing c/o \"blurred vision\".  also able to negotiate stairs w/o complaints and w/ S for safety. Pt reports daugther will be able to assist on d/c 24/7 if needed. - will be staying w/ daughter. Pt only complaints remains \"blurred vision\". PT signing off. OPPT recommended on d/c.  Barriers to Discharge: None     Rehab Resource Intensity Level, PT: III (Minimum Resource Intensity)    See flowsheet documentation for full assessment.        "

## 2024-02-01 NOTE — PROGRESS NOTES
"[facility]  Progress Note  Name: Mary Ashby I  MRN: [id number]  Unit/Bed#: [facility] 727-01 I Date of Admission: 1/24/2024   Date of Service: 2/1/2024 I Hospital Day: 8    Assessment/Plan   * Meningioma (HCC)  Assessment & Plan  POD#2 - s/p crani for L frontal meningioma (EM 1/30)  PPD#3 - s/p cerebral angiogram and embo of the L MMA (EM 1/29)   - Patient with left frontal meningioma  - presented with word finding difficulties intermittently for 5 years, increasing  for >1 month    Imaging:  MRI 1/30/24: Expected postop changes. Decreased vasogenic edema within the left temporal lobe extending to the ipsilateral subinsular and capsular regions. Persistent 0.7 cm rightward midline shift.  MRI 1/23/24: 4.3 cm probable meningioma in anterior aspect of left middle cranial fossa with surrounding edema    Plan:  Continue to closely monitor neuro exam   Frequent neuro checks per primary team   Repeat STAT CTH with any acute decline in GCS > 2pts or more in 1hr   Maintain normotensive BP goals, SBP < 160   No further neurosurgical intervention indicated at this time   Case and imaging reviewed this am on rounds   Pt doing well post-op **  Follow-up final surgical pathology --> prelim consistent with \"Spindle/epithelioid lesion, favor meningioma\"  1/31 - s/p removal of MARYSE drain at bedside   Continue post-op crani incisional care protocol   Maintain incision clean and dry   Clean incision daily with a GRZEGORZ wipe, pt may shower on POD#4   Continue vit C, vit A, and zinc x 7 days post-op   Continue on 2L NC and IVF x 7 days post-op or until day of discharge   Completed post-op abx   Continue keppra 750mg PO q 12 hrs   Continue decadron wean   Hold all AC/AP meds   Home ASA held   Not to be restarted until cleared by nsgy team, anticipate at least 2 week hold   DVT ppx: SCDs, SQ lovenox   medical management per primary team   Pain control per primary  PT/OT  Anticipate d/c home with family " "support   Social work following for assistance with dispo once medically cleared     Neurosurgery will continue to follow. Please call with any questions or concerns.           Subjective/Objective   Chief Complaint: \" I am doing well\"    Subjective: Pt seen and examined this am on rounds. GOLD. Pt is doing well this am. She offers no complaints. Denies any headaches, dizziness, vision changes, N/V, weakness, numbness, seizure like activity, or further word finding difficulty.     Patient states she was up and ambulating in the hallway without difficulty.  Patient states she has been eating and drinking more this morning.  She states she is urinating without difficulty and was able to have a small bowel movement yesterday.    Objective: Pleasant, middle-aged female sitting up comfortably in bed.  No acute distress.    I/O         01/30 0701 01/31 0700 01/31 0701 02/01 0700 02/01 0701 02/02 0700    P.O. 360 480     I.V. (mL/kg) 1866.3 (29.1)      IV Piggyback 150      Total Intake(mL/kg) 2376.3 (37.1) 480 (7.5)     Urine (mL/kg/hr) 3020 (2) 300 (0.2)     Drains 305      Stool       Blood 100      Total Output 3425 300     Net -1048.8 +180                  Invasive Devices       Peripheral Intravenous Line  Duration             Peripheral IV 01/30/24 Left Hand 2 days    Peripheral IV 01/30/24 Right Wrist 2 days                  Physical Exam:  Vitals: Blood pressure 146/77, pulse 88, temperature 98.6 °F (37 °C), temperature source Oral, resp. rate 16, height 5' 2\" (1.575 m), weight 64.4 kg (141 lb 14.4 oz), SpO2 96%, not currently breastfeeding.,Body mass index is 25.95 kg/m².    General appearance: alert, appears stated age, cooperative and no distress  Head:   -Left-sided incision is clean, dry, intact.  No surrounding erythema or edema.  No significant tenderness to palpation.  Drain site with intact suture.  No drainage.  Eyes: EOMI, PERRL  Neck: supple, symmetrical, trachea midline and NT  Back: no kyphosis " "present, no tenderness to percussion or palpation  Lungs: non labored breathing, no respiratory distress on room air  Heart: regular heart rate  Neurologic:   Mental status: Alert, oriented x3, thought content appropriate  Cranial nerves: grossly intact (Cranial nerves II-XII)  Sensory: normal to light touch bilaterally throughout  Motor: moving all extremities without focal weakness   Reflexes: 2+ and symmetric  Coordination: finger to nose normal bilaterally, no drift bilaterally    Lab Results:  Results from last 7 days   Lab Units 02/01/24  0542 01/31/24  0534 01/30/24  1436 01/29/24  0454   WBC Thousand/uL 13.55* 16.34* 18.51* 10.10   HEMOGLOBIN g/dL 14.0 14.6 14.1 13.9   HEMATOCRIT % 42.8 43.6 41.7 42.0   PLATELETS Thousands/uL 195 219 200 207   MONO PCT % 3*  --   --  6   EOS PCT % 0  --   --  0     Results from last 7 days   Lab Units 02/01/24  0542 01/31/24  0534 01/30/24  1436   SODIUM mmol/L 135 138 139   POTASSIUM mmol/L 4.3 4.0 4.0   CHLORIDE mmol/L 101 102 104   CO2 mmol/L 25 26 24   BUN mg/dL 22 14 15   CREATININE mg/dL 0.54* 0.58* 0.58*   CALCIUM mg/dL 8.6 8.9 8.2*     Results from last 7 days   Lab Units 01/31/24  0534   MAGNESIUM mg/dL 2.1     Results from last 7 days   Lab Units 01/31/24  0534   PHOSPHORUS mg/dL 3.9     Results from last 7 days   Lab Units 01/31/24  0534 01/29/24  0454   INR  1.09 1.10   PTT seconds 28 28     No results found for: \"TROPONINT\"  ABG:No results found for: \"PHART\", \"TRD7LXZ\", \"PO2ART\", \"LDE9HHX\", \"A9SOLZQS\", \"BEART\", \"SOURCE\"    Imaging Studies: I have personally reviewed pertinent reports.   and I have personally reviewed pertinent films in PACS    MRI abdomen w wo contrast    Result Date: 1/26/2024  Impression: No findings suspicious for malignancy in the abdomen. Tiny hepatic cysts, considered benign findings. Evidence of hepatic iron deposition. Correlate for prior blood transfusions and hemochromatosis. Findings most compatible with gallbladder " cholesterolosis/tiny polyps, typically benign. Recommend confirmation with right upper quadrant ultrasound. Please see today's lumbar MR report for evaluation of intramuscular paraspinal masses. Workstation performed: [id number]     MRI lumbar spine w wo contrast    Result Date: 1/26/2024  Impression: Paraspinal intramuscular masses. Imaging morphology most compatible with hemangiomas. Myxoma, sarcoma and metastases are considered less likely. No evidence of bone or intracanalicular neoplasm. Minimal degenerative disc disease. L4-L5 facet osteoarthritis with minimal degenerative anterolisthesis. No stenosis. Workstation performed: [id number]     CT chest abdomen pelvis w contrast    Result Date: 1/25/2024  Impression: 1.  No evidence of a primary malignancy in the chest, abdomen, or pelvis. 2.  Multiple bilateral pulmonary nodules measuring 4 mm or less are indeterminate in the absence of comparison imaging. Given ongoing malignancy work-up, recommend follow-up chest CT in 3 months to exclude metastatic disease. 3.  Multiple hypoattenuating hepatic lesions similarly are indeterminate in the absence of comparison imaging. The largest of these, approximately 1 cm, measure slightly above fluid density. In light of ongoing malignancy work-up, recommend further characterization with contrast-enhanced abdominal MRI. 4.  Multiple ill-defined foci of enhancement in the lumbar paraspinal musculature, at least 1 of these measuring 1.6 cm does appear somewhat rounded and masslike. Metastatic disease cannot be excluded. The more superior of these lesions should be included in the field-of-view on the recommended abdominal MRI which may aid in further characterization. PET/CT may also be considered for further assessment. The study was marked in EPIC for immediate notification. Workstation performed: [id number]     CTA head w wo contrast    Result Date: 1/25/2024  Impression: 1. Large, 3.6 cm dural based extra-axial mass with  associated enhancement in the left middle cranial fossa correlates to the MRI performed the day earlier. There is surrounding vasogenic edema and 0.7 cm of associated right-sided subfalcine herniation. Imaging features are most compatible with meningioma. 2. Exuberant arterial supply to the left middle cranial fossa meningioma likely from the left middle meningeal artery which is slightly hypertrophied on the left compared to the right. A prominent draining cortical vein empties some of the enhancement of  the tumor along the superior margin draining into a cortical vein running over the anterior lateral aspects of the left frontal lobe. Significant associated mass effect in the left MCA bifurcation region and left MCA candelabra which are elevated along the posterior superior margin of this mass. Workstation performed: [id number]     EKG, Pathology, and Other Studies: I have personally reviewed pertinent reports.      VTE Pharmacologic Prophylaxis: Sequential compression device (Venodyne)  and Enoxaparin (Lovenox)    VTE Mechanical Prophylaxis: sequential compression device

## 2024-02-01 NOTE — RESTORATIVE TECHNICIAN NOTE
Restorative Technician Note      Patient Name: Mary Ashby     Note Type: Mobility  Patient Position Upon Consult: Supine  Activity Performed: Ambulated; Dangled; Stood  Assistive Device: Other (Comment) (none)  Education Provided: Yes  Patient Position at End of Consult: Supine; All needs within reach; Bed/Chair alarm activated    Anjana FLORES, Restorative Technician,

## 2024-02-01 NOTE — ASSESSMENT & PLAN NOTE
"POD#2 - s/p crani for L frontal meningioma (EM 1/30)  PPD#3 - s/p cerebral angiogram and embo of the L MMA (EM 1/29)   - Patient with left frontal meningioma  - presented with word finding difficulties intermittently for 5 years, increasing  for >1 month    Imaging:  MRI 1/30/24: Expected postop changes. Decreased vasogenic edema within the left temporal lobe extending to the ipsilateral subinsular and capsular regions. Persistent 0.7 cm rightward midline shift.  MRI 1/23/24: 4.3 cm probable meningioma in anterior aspect of left middle cranial fossa with surrounding edema    Plan:  Continue to closely monitor neuro exam   Frequent neuro checks per primary team   Repeat STAT CTH with any acute decline in GCS > 2pts or more in 1hr   Maintain normotensive BP goals, SBP < 160   No further neurosurgical intervention indicated at this time   Case and imaging reviewed this am on rounds   Pt doing well post-op **  Follow-up final surgical pathology --> prelim consistent with \"Spindle/epithelioid lesion, favor meningioma\"  1/31 - s/p removal of MARYSE drain at bedside   Continue post-op crani incisional care protocol   Maintain incision clean and dry   Clean incision daily with a GRZEGORZ wipe, pt may shower on POD#4   Continue vit C, vit A, and zinc x 7 days post-op   Continue on 2L NC and IVF x 7 days post-op or until day of discharge   Completed post-op abx   Continue keppra 750mg PO q 12 hrs   Continue decadron wean   Hold all AC/AP meds   Home ASA held   Not to be restarted until cleared by nsgy team, anticipate at least 2 week hold   DVT ppx: SCDs, SQ lovenox   medical management per primary team   Pain control per primary  PT/OT  Anticipate d/c home with family support   Social work following for assistance with dispo once medically cleared     Neurosurgery will continue to follow. Please call with any questions or concerns.    "

## 2024-02-01 NOTE — PHYSICAL THERAPY NOTE
"    PHYSICAL THERAPY TREATMENT  NAME:  Mary Ashby  DATE: 02/01/24    AGE:   63 y.o.  Mrn:   [id number]  ADMIT DX:  Vasogenic brain edema (HCC) [G93.6]    Past Medical History:   Diagnosis Date    Arthritis     BRCA1 negative     Disease of thyroid gland     Endometrial cancer (HCC) 1991    Thyroid disease     Uterus cancer (HCC)      Past Surgical History:   Procedure Laterality Date    CRANIOTOMY Left 1/30/2024    Procedure: IMAGE GUIDED LEFT PTERIONAL CRANIOTOMY FOR TUMOR;  Surgeon: Placido Lowery MD;  Location: [facility];  Service: Neurosurgery    HYSTERECTOMY      still has ovaries       Length Of Stay: 8     02/01/24 1431   PT Last Visit   PT Visit Date 02/01/24   End of Consult   Patient Position at End of Consult Supine;All needs within reach;Bed/Chair alarm activated   Pain Assessment   Pain Assessment Tool 0-10   Pain Score No Pain   Restrictions/Precautions   Weight Bearing Precautions Per Order No   Other Precautions Visual impairment  (pt reports ongoing \"blurry vision\" since surgery)   General   Chart Reviewed Yes   Family/Caregiver Present No   Cognition   Overall Cognitive Status WFL   Arousal/Participation Alert   Attention Within functional limits   Orientation Level Oriented X4   Memory Within functional limits   Following Commands Follows all commands and directions without difficulty   Subjective   Subjective pt reports getting up and mobilizing in room w/o assist or difficulty- reports ongoing blurry vision since surgery- otherwise no complaints; is eager to d/c home w/ assist of daughter.   Bed Mobility   Supine to Sit 7  Independent   Sit to Supine 7  Independent   Transfers   Sit to Stand 7  Independent   Stand to Sit 7  Independent   Toilet transfer 7  Independent   Additional Comments pt ambualtes around room w/o diffiuclty or LOB despit ongoing c/o \"blurred vision\"   Ambulation/Elevation   Gait Assistance 5  Supervision   Assistive Device None   Distance > 300+200 + stairs.   Stair " "Management Assistance 5  Supervision   Stair Management Technique One rail R;Foreward   Number of Stairs 8   Ambulation/Elevation Additional Comments pt w//o LOB- reports \"blurred vision\" since surgery that is continuing to improve   Balance   Static Sitting Good   Dynamic Sitting Fair +   Static Standing Fair +   Dynamic Standing Fair +   Ambulatory Fair   Endurance Deficit   Endurance Deficit No   Activity Tolerance   Activity Tolerance Patient tolerated treatment well   Medical Staff Made Aware yes   Nurse Made Aware yes   Exercises   Balance training  sidestepping and forward/ backward walking w/o AD-   Assessment   Prognosis Good   Problem List Impaired vision  (blurry vision)   Assessment pt indep for bed skills and transfers; gait w/o AD > 500' total w/o difficulty + pt ambualtes around room w/o diffiuclty or LOB despit ongoing c/o \"blurred vision\".  also able to negotiate stairs w/o complaints and w/ S for safety. Pt reports daugther will be able to assist on d/c 24/7 if needed. - will be staying w/ daughter. Pt only complaints remains \"blurred vision\". PT signing off. OPPT recommended on d/c.   Barriers to Discharge None   Goals   Patient Goals go home   PT Treatment Day 2   Discharge Recommendation   Rehab Resource Intensity Level, PT III (Minimum Resource Intensity)   AM-PAC Basic Mobility Inpatient   Turning in Flat Bed Without Bedrails 4   Lying on Back to Sitting on Edge of Flat Bed Without Bedrails 4   Moving Bed to Chair 4   Standing Up From Chair Using Arms 4   Walk in Room 4   Climb 3-5 Stairs With Railing 4   Basic Mobility Inpatient Raw Score 24   Basic Mobility Standardized Score 57.68   Highest Level Of Mobility   JH-HLM Goal 8: Walk 250 feet or more   JH-HLM Achieved 8: Walk 250 feet ot more       The patient's AM-PAC Basic Mobility Inpatient Short Form Raw Score is 24. A Raw score of greater than 16 suggests the patient may benefit from discharge to home. Please also refer to the " recommendation of the Physical Therapist for safe discharge planning.            Alondra Velazquez, PT

## 2024-02-02 ENCOUNTER — TELEPHONE (OUTPATIENT)
Dept: NEUROSURGERY | Facility: CLINIC | Age: 64
End: 2024-02-02

## 2024-02-02 ENCOUNTER — TRANSITIONAL CARE MANAGEMENT (OUTPATIENT)
Dept: FAMILY MEDICINE CLINIC | Facility: CLINIC | Age: 64
End: 2024-02-02

## 2024-02-02 VITALS
OXYGEN SATURATION: 96 % | HEIGHT: 62 IN | WEIGHT: 141.9 LBS | RESPIRATION RATE: 16 BRPM | TEMPERATURE: 98.4 F | DIASTOLIC BLOOD PRESSURE: 80 MMHG | HEART RATE: 73 BPM | BODY MASS INDEX: 26.11 KG/M2 | SYSTOLIC BLOOD PRESSURE: 164 MMHG

## 2024-02-02 PROBLEM — I10 PRIMARY HYPERTENSION: Status: ACTIVE | Noted: 2024-02-02

## 2024-02-02 LAB
ANION GAP SERPL CALCULATED.3IONS-SCNC: 6 MMOL/L
BUN SERPL-MCNC: 22 MG/DL (ref 5–25)
CALCIUM SERPL-MCNC: 8.8 MG/DL (ref 8.4–10.2)
CHLORIDE SERPL-SCNC: 103 MMOL/L (ref 96–108)
CO2 SERPL-SCNC: 28 MMOL/L (ref 21–32)
CREAT SERPL-MCNC: 0.48 MG/DL (ref 0.6–1.3)
ERYTHROCYTE [DISTWIDTH] IN BLOOD BY AUTOMATED COUNT: 12.8 % (ref 11.6–15.1)
GFR SERPL CREATININE-BSD FRML MDRD: 106 ML/MIN/1.73SQ M
GLUCOSE SERPL-MCNC: 115 MG/DL (ref 65–140)
HCT VFR BLD AUTO: 38.8 % (ref 34.8–46.1)
HGB BLD-MCNC: 13.1 G/DL (ref 11.5–15.4)
MAGNESIUM SERPL-MCNC: 2.2 MG/DL (ref 1.9–2.7)
MCH RBC QN AUTO: 30.5 PG (ref 26.8–34.3)
MCHC RBC AUTO-ENTMCNC: 33.8 G/DL (ref 31.4–37.4)
MCV RBC AUTO: 90 FL (ref 82–98)
PLATELET # BLD AUTO: 182 THOUSANDS/UL (ref 149–390)
PMV BLD AUTO: 9.7 FL (ref 8.9–12.7)
POTASSIUM SERPL-SCNC: 4.4 MMOL/L (ref 3.5–5.3)
RBC # BLD AUTO: 4.3 MILLION/UL (ref 3.81–5.12)
SODIUM SERPL-SCNC: 137 MMOL/L (ref 135–147)
WBC # BLD AUTO: 10.78 THOUSAND/UL (ref 4.31–10.16)

## 2024-02-02 PROCEDURE — 85027 COMPLETE CBC AUTOMATED: CPT | Performed by: INTERNAL MEDICINE

## 2024-02-02 PROCEDURE — 83735 ASSAY OF MAGNESIUM: CPT | Performed by: INTERNAL MEDICINE

## 2024-02-02 PROCEDURE — 99024 POSTOP FOLLOW-UP VISIT: CPT | Performed by: NEUROLOGICAL SURGERY

## 2024-02-02 PROCEDURE — 99239 HOSP IP/OBS DSCHRG MGMT >30: CPT | Performed by: INTERNAL MEDICINE

## 2024-02-02 PROCEDURE — 80048 BASIC METABOLIC PNL TOTAL CA: CPT | Performed by: INTERNAL MEDICINE

## 2024-02-02 RX ORDER — ACETAMINOPHEN 325 MG/1
975 TABLET ORAL EVERY 8 HOURS SCHEDULED
Qty: 90 TABLET | Refills: 0 | Status: SHIPPED | OUTPATIENT
Start: 2024-02-02 | End: 2024-02-12

## 2024-02-02 RX ORDER — DOCUSATE SODIUM 100 MG/1
100 CAPSULE, LIQUID FILLED ORAL 2 TIMES DAILY
Qty: 20 CAPSULE | Refills: 0 | Status: SHIPPED | OUTPATIENT
Start: 2024-02-02 | End: 2024-02-12

## 2024-02-02 RX ORDER — ZINC SULFATE 50(220)MG
220 CAPSULE ORAL DAILY
Qty: 4 CAPSULE | Refills: 0 | Status: SHIPPED | OUTPATIENT
Start: 2024-02-03 | End: 2024-02-07

## 2024-02-02 RX ORDER — LEVETIRACETAM 750 MG/1
750 TABLET ORAL EVERY 12 HOURS SCHEDULED
Qty: 14 TABLET | Refills: 0 | Status: SHIPPED | OUTPATIENT
Start: 2024-02-02 | End: 2024-02-10

## 2024-02-02 RX ORDER — SENNOSIDES 8.6 MG
8.6 TABLET ORAL DAILY
Qty: 10 TABLET | Refills: 0 | Status: SHIPPED | OUTPATIENT
Start: 2024-02-03 | End: 2024-02-13

## 2024-02-02 RX ORDER — HYDRALAZINE HYDROCHLORIDE 25 MG/1
25 TABLET, FILM COATED ORAL EVERY 8 HOURS SCHEDULED
Qty: 90 TABLET | Refills: 0 | Status: SHIPPED | OUTPATIENT
Start: 2024-02-02

## 2024-02-02 RX ORDER — DEXAMETHASONE 2 MG/1
TABLET ORAL EVERY 8 HOURS SCHEDULED
Qty: 26 TABLET | Refills: 0 | Status: SHIPPED | OUTPATIENT
Start: 2024-02-02 | End: 2024-02-10

## 2024-02-02 RX ORDER — PANTOPRAZOLE SODIUM 40 MG/1
40 TABLET, DELAYED RELEASE ORAL
Qty: 10 TABLET | Refills: 0 | Status: SHIPPED | OUTPATIENT
Start: 2024-02-03 | End: 2024-02-13

## 2024-02-02 RX ORDER — ASCORBIC ACID 500 MG
500 TABLET ORAL 2 TIMES DAILY
Qty: 8 TABLET | Refills: 0 | Status: SHIPPED | OUTPATIENT
Start: 2024-02-02 | End: 2024-02-10

## 2024-02-02 RX ADMIN — DEXAMETHASONE 4 MG: 4 TABLET ORAL at 06:22

## 2024-02-02 RX ADMIN — OXYCODONE HYDROCHLORIDE AND ACETAMINOPHEN 500 MG: 500 TABLET ORAL at 10:00

## 2024-02-02 RX ADMIN — LEVETIRACETAM 750 MG: 750 TABLET, FILM COATED ORAL at 09:59

## 2024-02-02 RX ADMIN — ZINC SULFATE 220 MG (50 MG) CAPSULE 220 MG: CAPSULE at 10:00

## 2024-02-02 RX ADMIN — HYDRALAZINE HYDROCHLORIDE 25 MG: 25 TABLET, FILM COATED ORAL at 13:13

## 2024-02-02 RX ADMIN — DEXAMETHASONE 4 MG: 4 TABLET ORAL at 13:13

## 2024-02-02 RX ADMIN — ACETAMINOPHEN 975 MG: 325 TABLET, FILM COATED ORAL at 13:13

## 2024-02-02 RX ADMIN — DOCUSATE SODIUM 100 MG: 100 CAPSULE, LIQUID FILLED ORAL at 10:00

## 2024-02-02 RX ADMIN — SENNOSIDES 8.6 MG: 8.6 TABLET, FILM COATED ORAL at 10:00

## 2024-02-02 RX ADMIN — HYDRALAZINE HYDROCHLORIDE 25 MG: 25 TABLET, FILM COATED ORAL at 06:22

## 2024-02-02 RX ADMIN — FLUOXETINE HYDROCHLORIDE 20 MG: 20 CAPSULE ORAL at 10:00

## 2024-02-02 RX ADMIN — LEVOTHYROXINE SODIUM 75 MCG: 75 TABLET ORAL at 06:22

## 2024-02-02 RX ADMIN — PANTOPRAZOLE SODIUM 40 MG: 40 TABLET, DELAYED RELEASE ORAL at 06:22

## 2024-02-02 RX ADMIN — Medication 20000 UNITS: at 10:01

## 2024-02-02 RX ADMIN — ACETAMINOPHEN 975 MG: 325 TABLET, FILM COATED ORAL at 06:21

## 2024-02-02 NOTE — RESTORATIVE TECHNICIAN NOTE
Restorative Technician Note      Patient Name: Mary Ashby     Note Type: Mobility  Patient Position Upon Consult: Supine  Activity Performed: Ambulated; Dangled; Stood  Assistive Device: Other (Comment) (none)  Education Provided: Yes  Patient Position at End of Consult: Supine; All needs within reach; Bed/Chair alarm activated    Anjana FLROES, Restorative Technician,

## 2024-02-02 NOTE — CASE MANAGEMENT
Case Management Discharge Planning Note    Patient name Mary Ashby  Location [facility] 727/[facility] 727-01 MRN [id number]  : 1960 Date 2024       Current Admission Date: 2024  Current Admission Diagnosis:Meningioma (HCC)   Patient Active Problem List    Diagnosis Date Noted    Primary hypertension 2024    Gallbladder polyp 2024    Lung nodules 2024    Hepatic lesion 2024    Abnormal CT scan, lumbar spine 2024    Meningioma (HCC) 2024    Anxiety 2023    Encounter for gynecological examination (general) (routine) without abnormal findings 2022    Lipid screening 2018    Need for hepatitis C screening test 2018    Hypothyroidism 2016      LOS (days): 9  Geometric Mean LOS (GMLOS) (days): 6.6  Days to GMLOS:-2.6     OBJECTIVE:  Risk of Unplanned Readmission Score: 12.17         Current admission status: Inpatient   Preferred Pharmacy:   [facility] [place] [place] - [address]  [address]  [address]  [facility] [address]  Phone: [phone] Fax: [phone]    Primary Care Provider: Sinan Palacios PA-C    Primary Insurance: [employer]  Secondary Insurance:     DISCHARGE DETAILS:           Additional Comments: Pt discharged home with no needs

## 2024-02-02 NOTE — PROGRESS NOTES
[facility]  Progress Note  Name: Mary Ashby I  MRN: [id number]  Unit/Bed#: [facility] 727-01 I Date of Admission: 1/24/2024   Date of Service: 2/1/2024 I Hospital Day: 8    Assessment/Plan   * Meningioma (HCC)  Assessment & Plan  Left frontal meningioma, anterior left middle cranial fossa; likely focal-onset, retained awareness seizures given description of aphasia/apraxia episodes - patient reports these episodes stopped since starting Keppra  Patient presented with word finding difficulties intermittently for 5 years, increasing over the past month  MRI (1/23): 4.3 cm probable meningioma in the anterior aspect of the left middle cranial fossa with surrounding edema  S/p cerebral angiogram and embolization of L MMA on 1/29/2024  S/p resection on 1/30/2024  MARYSE drain removed on 1/31/2024 at the bedside  On Keppra 750 mg p.o. every 12 hours, Decadron wean  Home ASA held till cleared by neurosurgery  Neurosurgery following - input appreciated    Hepatic lesion  Assessment & Plan  Multiple hypoattenuating hepatic lesions seen on CT scan  Abdominal MRI consistent with tiny hepatic cysts, considered benign findings     Outpatient follow-up    Abnormal CT scan, lumbar spine  Assessment & Plan  Multiple ill-defined foci of enhancement in the lumbar paraspinal musculature seen on CT scan    Lumbar spine MRI with paraspinal intramuscular masses. Imaging morphology most compatible with hemangiomas. Myxoma, sarcoma and metastases are considered less likely     Outpatient follow-up  Has appointment scheduled with oncology    Gallbladder polyp  Assessment & Plan  Abdominal MRI with Findings most compatible with gallbladder cholesterolosis/tiny polyps, typically benign.   Recommend confirmation with right upper quadrant ultrasound.   Outpatient workup    Lung nodules  Assessment & Plan  CT scan with multiple bilateral pulmonary nodules measuring 4 mm   Recommendation for follow-up chest CT in  3 months to exclude metastatic disease.    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    Mobility:   Basic Mobility Inpatient Raw Score: 24  JH-HLM Goal: 8: Walk 250 feet or more  JH-HLM Achieved: 8: Walk 250 feet ot more  HLM Goal achieved. Continue to encourage appropriate mobility.    Patient Centered Rounds: Discussed with RN     Education and Discussions with Family / Patient: Updated  (daughter) via phone.    Total Time Spent on Date of Encounter in care of patient: 20 mins. This time was spent on one or more of the following: performing physical exam; counseling and coordination of care; obtaining or reviewing history; documenting in the medical record; reviewing/ordering tests, medications or procedures; communicating with other healthcare professionals and discussing with patient's family/caregivers.    Current Length of Stay: 8 day(s)  Current Patient Status: Inpatient   Certification Statement: The patient will continue to require additional inpatient hospital stay due to post meningioma resection    Code Status: Level 1 - Full Code    Subjective:   No headache. Mild dizziness on standing up    Objective:     Vitals:   Temp (24hrs), Av.9 °F (37.2 °C), Min:98.6 °F (37 °C), Max:99.3 °F (37.4 °C)    Temp:  [98.6 °F (37 °C)-99.3 °F (37.4 °C)] 98.7 °F (37.1 °C)  HR:  [69-88] 88  Resp:  [16-17] 17  BP: (142-162)/(74-89) 162/89  SpO2:  [96 %] 96 %  Body mass index is 25.95 kg/m².     Physical Exam:   Physical  Exam  Vitals reviewed.   HENT:      Head: Normocephalic.      Nose: Nose normal.      Mouth/Throat:      Mouth: Mucous membranes are moist.   Eyes:      Extraocular Movements: Extraocular movements intact.   Cardiovascular:      Rate and Rhythm: Normal rate and regular rhythm.   Pulmonary:      Effort: Pulmonary effort is normal. No respiratory distress.      Breath sounds: Normal breath sounds. No wheezing.   Abdominal:      General: Bowel sounds are normal. There is no distension.      Palpations: Abdomen is soft.      Tenderness: There is no abdominal tenderness.   Musculoskeletal:         General: No swelling.      Cervical back: Neck supple.   Skin:     General: Skin is warm and dry.   Neurological:      General: No focal deficit present.      Mental Status: She is alert and oriented to person, place, and time.   Psychiatric:         Mood and Affect: Mood normal.         Behavior: Behavior normal.          Additional Data:     Labs:  Results from last 7 days   Lab Units 02/01/24  0542 01/30/24  1436 01/29/24  0454   WBC Thousand/uL 13.55*   < > 10.10   HEMOGLOBIN g/dL 14.0   < > 13.9   HEMATOCRIT % 42.8   < > 42.0   PLATELETS Thousands/uL 195   < > 207   BANDS PCT %  --   --  1   LYMPHO PCT % 5*  --  11*   MONO PCT % 3*  --  6   EOS PCT % 0  --  0    < > = values in this interval not displayed.     Results from last 7 days   Lab Units 02/01/24  0542   SODIUM mmol/L 135   POTASSIUM mmol/L 4.3   CHLORIDE mmol/L 101   CO2 mmol/L 25   BUN mg/dL 22   CREATININE mg/dL 0.54*   ANION GAP mmol/L 9   CALCIUM mg/dL 8.6   GLUCOSE RANDOM mg/dL 123     Results from last 7 days   Lab Units 01/31/24  0534   INR  1.09         Results from last 7 days   Lab Units 01/30/24  0614   HEMOGLOBIN A1C % 5.6           Lines/Drains:  Invasive Devices       Peripheral Intravenous Line  Duration             Peripheral IV 01/30/24 Left Hand 2 days    Peripheral IV 01/30/24 Right Wrist 2 days                        Last 24 Hours Medication  List:   Current Facility-Administered Medications   Medication Dose Route Frequency Provider Last Rate    acetaminophen  650 mg Oral Q6H PRN Brit No MD      acetaminophen  975 mg Oral Q8H KEELY Brit No MD      ascorbic acid  500 mg Oral BID Brit No MD      bisacodyl  10 mg Rectal Daily PRN Brit No MD      calcium carbonate  1,000 mg Oral Daily PRN Brit No MD      dexamethasone  4 mg Oral Q8H KEELY Brit No MD      Followed by    [START ON 2/3/2024] dexamethasone  2 mg Oral Q6H KEELY Brit No MD      Followed by    [START ON 2/5/2024] dexamethasone  2 mg Oral Q8H KEELY Brit No MD      Followed by    [START ON 2/7/2024] dexamethasone  2 mg Oral Q12H KEELY Brit No MD      Followed by    [START ON 2/9/2024] dexamethasone  2 mg Oral Q24H KEELY Brit No MD      docusate sodium  100 mg Oral BID Brit No MD      enoxaparin  40 mg Subcutaneous Q24H KEELY Brit No MD      FLUoxetine  20 mg Oral Daily Brit No MD      hydrALAZINE  10 mg Intravenous Q4H PRN Brit No MD      hydrALAZINE  25 mg Oral Q8H KEELY Brit No MD      HYDROmorphone  0.2 mg Intravenous Q4H PRN Brit No MD      levETIRAcetam  750 mg Oral Q12H KEELY Brit No MD      levothyroxine  75 mcg Oral Early Morning Brit No MD      ondansetron  4 mg Intravenous Q6H PRN Brit No MD      oxyCODONE  2.5 mg Oral Q4H PRN Brit No MD      Or    oxyCODONE  5 mg Oral Q4H PRN Brit No MD      pantoprazole  40 mg Oral Early Morning Brit No MD      senna  1 tablet Oral Daily Brit No MD      vitamin A  20,000 Units Oral Daily Brit No MD      zinc sulfate  220 mg Oral Daily Brit No MD          Today, Patient Was Seen By: Blanca Staley MD    **Please Note: This note may have been constructed using a voice recognition system.**

## 2024-02-02 NOTE — ASSESSMENT & PLAN NOTE
"POD#3 - s/p crani for L frontal meningioma (EM 1/30)  PPD#4 - s/p cerebral angiogram and embo of the L MMA (EM 1/29)   - Patient with left frontal meningioma  - presented with word finding difficulties intermittently for 5 years, increasing  for >1 month    Imaging:  MRI 1/30/24: Expected postop changes. Decreased vasogenic edema within the left temporal lobe extending to the ipsilateral subinsular and capsular regions. Persistent 0.7 cm rightward midline shift.  MRI 1/23/24: 4.3 cm probable meningioma in anterior aspect of left middle cranial fossa with surrounding edema    Plan:  Continue to closely monitor neuro exam   Frequent neuro checks per primary team   Repeat STAT CTH with any acute decline in GCS > 2pts or more in 1hr   Maintain normotensive BP goals, SBP < 160   No further neurosurgical intervention indicated at this time   Case and imaging reviewed this am on rounds   Pt doing well post-op and is ready for her d/c hoem today   Follow-up final surgical pathology --> prelim consistent with \"Spindle/epithelioid lesion, favor meningioma\"  1/31 - s/p removal of MARYSE drain at bedside   Continue post-op crani incisional care protocol   Maintain incision clean and dry   Clean incision daily with a GRZEGORZ wipe, pt may shower on POD#4   Continue vit C, vit A, and zinc x 7 days post-op   Continue on 2L NC and IVF x 7 days post-op or until day of discharge --> to be discontinued today   Completed post-op abx   Continue keppra 750mg PO q 12 hrs x 7 days post-op   Continue decadron wean   Hold all AC/AP meds   Home ASA held   Not to be restarted until cleared by nsgy team, anticipate at least 2 week hold   DVT ppx: SCDs, SQ lovenox   medical management per primary team   Pain control per primary  PT/OT  Anticipate d/c home with family support today   Social work following for assistance with dispo once medically cleared     Neurosurgery will sign off at this time. Will plan to follow-up with the pt again in 2 weeks for " an incision check and a final path review. Please call with any questions or concerns.

## 2024-02-02 NOTE — INCIDENTAL FINDINGS
The following findings require follow up:  Radiographic finding   Finding: CT chest/abdomen/pelvis with multiple bilateral pulmonary nodules measuring 4 mm or less are indeterminate in the absence of comparison imaging. Given ongoing malignancy work-up, recommend follow-up chest CT in 3 months to exclude metastatic disease.    Follow up required: CT chest without contrast   Follow up should be done within 3 month(s)    Please notify the following clinician to assist with the follow up:   Dr. Sinan Palacios

## 2024-02-02 NOTE — PROGRESS NOTES
"[facility]  Progress Note  Name: Mary Ashby I  MRN: [id number]  Unit/Bed#: [facility]PHP 727-01 I Date of Admission: 1/24/2024   Date of Service: 2/2/2024 I Hospital Day: 9    Assessment/Plan   * Meningioma (HCC)  Assessment & Plan  POD#3 - s/p crani for L frontal meningioma (EM 1/30)  PPD#4 - s/p cerebral angiogram and embo of the L MMA (EM 1/29)   - Patient with left frontal meningioma  - presented with word finding difficulties intermittently for 5 years, increasing  for >1 month    Imaging:  MRI 1/30/24: Expected postop changes. Decreased vasogenic edema within the left temporal lobe extending to the ipsilateral subinsular and capsular regions. Persistent 0.7 cm rightward midline shift.  MRI 1/23/24: 4.3 cm probable meningioma in anterior aspect of left middle cranial fossa with surrounding edema    Plan:  Continue to closely monitor neuro exam   Frequent neuro checks per primary team   Repeat STAT CTH with any acute decline in GCS > 2pts or more in 1hr   Maintain normotensive BP goals, SBP < 160   No further neurosurgical intervention indicated at this time   Case and imaging reviewed this am on rounds   Pt doing well post-op and is ready for her d/c hoem today   Follow-up final surgical pathology --> prelim consistent with \"Spindle/epithelioid lesion, favor meningioma\"  1/31 - s/p removal of MARYSE drain at bedside   Continue post-op crani incisional care protocol   Maintain incision clean and dry   Clean incision daily with a GRZEGORZ wipe, pt may shower on POD#4   Continue vit C, vit A, and zinc x 7 days post-op   Continue on 2L NC and IVF x 7 days post-op or until day of discharge --> to be discontinued today   Completed post-op abx   Continue keppra 750mg PO q 12 hrs x 7 days post-op   Continue decadron wean   Hold all AC/AP meds   Home ASA held   Not to be restarted until cleared by nsgy team, anticipate at least 2 week hold   DVT ppx: SCDs, SQ lovenox   medical management per " "primary team   Pain control per primary  PT/OT  Anticipate d/c home with family support today   Social work following for assistance with dispo once medically cleared     Neurosurgery will sign off at this time. Will plan to follow-up with the pt again in 2 weeks for an incision check and a final path review. Please call with any questions or concerns.           Subjective/Objective   Chief Complaint: \" I am ready to get home\"    Subjective: pt seen and examined this am on rounds. GOLD. Pt offers no complaints this am. She is hopeful to be discharged home today.  Denies any headaches, dizziness, vision changes, nausea, vomiting, weakness, numbness, seizure-like activity, LOC.  Patient states she is feeling well.  She is eating and drinking well.  Patient is only using Tylenol for her pain control.    Objective: Pleasant, middle-aged female sitting comfortably in bed.  No acute distress.    I/O         01/31 0701  02/01 0700 02/01 0701  02/02 0700 02/02 0701 02/03 0700    P.O. 480      I.V. (mL/kg)       IV Piggyback       Total Intake(mL/kg) 480 (7.5)      Urine (mL/kg/hr) 300 (0.2)      Drains       Blood       Total Output 300      Net +180             Unmeasured Stool Occurrence  2 x             Invasive Devices       Peripheral Intravenous Line  Duration             Peripheral IV 01/30/24 Left Hand 3 days    Peripheral IV 01/30/24 Right Wrist 3 days                    Physical Exam:  Vitals: Blood pressure 154/82, pulse 73, temperature 98.4 °F (36.9 °C), temperature source Oral, resp. rate 16, height 5' 2\" (1.575 m), weight 64.4 kg (141 lb 14.4 oz), SpO2 96%, not currently breastfeeding.,Body mass index is 25.95 kg/m².    General appearance: alert, appears stated age, cooperative and no distress  Head: Status post left-sided crani, incision is clean, dry, intact.  No surrounding erythema, edema or significant tenderness.  No drainage or dehiscence.  Eyes: EOMI, PERRL  Neck: supple, symmetrical, trachea midline " "and NT  Back: no kyphosis present, no tenderness to percussion or palpation  Lungs: non labored breathing, no respiratory distress on room air  Heart: regular heart rate  Neurologic:   Mental status: Alert, oriented x3, thought content appropriate  Cranial nerves: grossly intact (Cranial nerves II-XII)  Sensory: normal to light touch bilaterally throughout  Motor: moving all extremities without focal weakness   Reflexes: 2+ and symmetric  Coordination: finger to nose normal bilaterally, no drift bilaterally      Lab Results:  Results from last 7 days   Lab Units 02/02/24  0624 02/01/24  0542 01/31/24  0534 01/30/24  1436 01/29/24  0454   WBC Thousand/uL 10.78* 13.55* 16.34*   < > 10.10   HEMOGLOBIN g/dL 13.1 14.0 14.6   < > 13.9   HEMATOCRIT % 38.8 42.8 43.6   < > 42.0   PLATELETS Thousands/uL 182 195 219   < > 207   MONO PCT %  --  3*  --   --  6   EOS PCT %  --  0  --   --  0    < > = values in this interval not displayed.     Results from last 7 days   Lab Units 02/02/24  0624 02/01/24  0542 01/31/24  0534   SODIUM mmol/L 137 135 138   POTASSIUM mmol/L 4.4 4.3 4.0   CHLORIDE mmol/L 103 101 102   CO2 mmol/L 28 25 26   BUN mg/dL 22 22 14   CREATININE mg/dL 0.48* 0.54* 0.58*   CALCIUM mg/dL 8.8 8.6 8.9     Results from last 7 days   Lab Units 02/02/24  0624 01/31/24  0534   MAGNESIUM mg/dL 2.2 2.1     Results from last 7 days   Lab Units 01/31/24  0534   PHOSPHORUS mg/dL 3.9     Results from last 7 days   Lab Units 01/31/24  0534 01/29/24  0454   INR  1.09 1.10   PTT seconds 28 28     No results found for: \"TROPONINT\"  ABG:No results found for: \"PHART\", \"RVP6RXY\", \"PO2ART\", \"KGP6CNM\", \"U8MFIGRF\", \"BEART\", \"SOURCE\"    Imaging Studies: I have personally reviewed pertinent reports.   and I have personally reviewed pertinent films in PACS    MRI abdomen w wo contrast    Result Date: 1/26/2024  Impression: No findings suspicious for malignancy in the abdomen. Tiny hepatic cysts, considered benign findings. Evidence of " hepatic iron deposition. Correlate for prior blood transfusions and hemochromatosis. Findings most compatible with gallbladder cholesterolosis/tiny polyps, typically benign. Recommend confirmation with right upper quadrant ultrasound. Please see today's lumbar MR report for evaluation of intramuscular paraspinal masses. Workstation performed: [id number]     MRI lumbar spine w wo contrast    Result Date: 1/26/2024  Impression: Paraspinal intramuscular masses. Imaging morphology most compatible with hemangiomas. Myxoma, sarcoma and metastases are considered less likely. No evidence of bone or intracanalicular neoplasm. Minimal degenerative disc disease. L4-L5 facet osteoarthritis with minimal degenerative anterolisthesis. No stenosis. Workstation performed: [id number]     CT chest abdomen pelvis w contrast    Result Date: 1/25/2024  Impression: 1.  No evidence of a primary malignancy in the chest, abdomen, or pelvis. 2.  Multiple bilateral pulmonary nodules measuring 4 mm or less are indeterminate in the absence of comparison imaging. Given ongoing malignancy work-up, recommend follow-up chest CT in 3 months to exclude metastatic disease. 3.  Multiple hypoattenuating hepatic lesions similarly are indeterminate in the absence of comparison imaging. The largest of these, approximately 1 cm, measure slightly above fluid density. In light of ongoing malignancy work-up, recommend further characterization with contrast-enhanced abdominal MRI. 4.  Multiple ill-defined foci of enhancement in the lumbar paraspinal musculature, at least 1 of these measuring 1.6 cm does appear somewhat rounded and masslike. Metastatic disease cannot be excluded. The more superior of these lesions should be included in the field-of-view on the recommended abdominal MRI which may aid in further characterization. PET/CT may also be considered for further assessment. The study was marked in EPIC for immediate notification. Workstation performed:  [id number]     CTA head w wo contrast    Result Date: 1/25/2024  Impression: 1. Large, 3.6 cm dural based extra-axial mass with associated enhancement in the left middle cranial fossa correlates to the MRI performed the day earlier. There is surrounding vasogenic edema and 0.7 cm of associated right-sided subfalcine herniation. Imaging features are most compatible with meningioma. 2. Exuberant arterial supply to the left middle cranial fossa meningioma likely from the left middle meningeal artery which is slightly hypertrophied on the left compared to the right. A prominent draining cortical vein empties some of the enhancement of  the tumor along the superior margin draining into a cortical vein running over the anterior lateral aspects of the left frontal lobe. Significant associated mass effect in the left MCA bifurcation region and left MCA candelabra which are elevated along the posterior superior margin of this mass. Workstation performed: [id number]       EKG, Pathology, and Other Studies: I have personally reviewed pertinent reports.      VTE Pharmacologic Prophylaxis: Sequential compression device (Venodyne)  and Enoxaparin (Lovenox)    VTE Mechanical Prophylaxis: sequential compression device

## 2024-02-02 NOTE — DISCHARGE INSTR - AVS FIRST PAGE
Discharge Instructions  Craniotomy for tumor resection     Surgical incision care:  Incisions may be left open to air, but should remain clean.  THREE days after surgery you START showering using a baby shampoo including head incision. Rinse off shampoo and pat dry.   Wash hair AT LEAST every other day  Continue to use clean towel and washcloth for 2 weeks post-op.  Avoid rubbing the incision but gently massage hair.   Do NOT immerse the incisions in water for 6 weeks.  Staples/suture will be removed at your 2 week postoperative visit.   Do not apply any creams or ointments to the incision, unless otherwise instructed by [facility].  Contact office if increasing redness, drainage, pain or swelling occurs around the incisions or if you develop a fever greater than 101F  Do not dye/perm hair or use any hair products until cleared by Neurosurgery.             Activity:  Do not lift, push or pull more than 10 pounds for 2 weeks.  Avoid bending, lifting and twisting for 2 weeks. No running. No athletic activities until cleared.   No driving for at least 2 weeks or until cleared by Neurosurgery.   Do not use a hair dryer, and NO hair products such as mousse, oils, and gels. Do not brush your hair away from the incision since this will put strain on the suture line.  Do NOT dye or perm hair for 6 weeks or until cleared by physician.  Continue to change bed linens and pajamas more frequently. Wear clean clothes daily.   May walk as tolerated. Recommend 4 short walks daily.         Postoperative medication:  [facility] will provide pain medication in the postoperative period. All prescriptions must come from a single practice.   Take medications as prescribed.  Call office with any questions/concerns.  May use over the counter Tylenol. No NSAIDs (ie. Ibuprofen, Aleve, Advil, Naproxen)  Please contact office for questions regarding dosage and modifications.  No antiplatelet  or anticoagulation medication (ie. Coumadin, Aspirin, Plavix) until cleared by [facility], unless otherwise instructed. Please contact . [facility] if you have any questions about the effects of any of your medications on blood clotting.  Do not operate heavy machinery or vehicles while taking sedating medications.  Use a bowel regimen while on opioids as they induce constipation. Ie. Senokot-S, Miralax, Colace, etc. Increase fiber and water intake.       Follow-up as scheduled for a 2 week post-operative visit for an incision check and final pathology.     ** Please notify the office if incision becomes red, swollen, tender, or has increased drainage, and temp>101.  Return to the ER if you experience increased headache, drowsiness, weakness, nausea/vomiting, or seizures.**

## 2024-02-02 NOTE — INCIDENTAL FINDINGS
The following findings require follow up:  Radiographic finding   Finding: MRI abdomen with Findings most compatible with gallbladder cholesterolosis/tiny polyps, typically benign. Recommend confirmation with right upper quadrant ultrasound.    Follow up required: Right upper quadrant ultrasound   Follow up should be done within 3-4 week(s)    Please notify the following clinician to assist with the follow up:   Dr. Sinan Palacios

## 2024-02-03 PROBLEM — Z01.419 ENCOUNTER FOR GYNECOLOGICAL EXAMINATION (GENERAL) (ROUTINE) WITHOUT ABNORMAL FINDINGS: Status: RESOLVED | Noted: 2022-11-22 | Resolved: 2024-02-03

## 2024-02-04 NOTE — DISCHARGE SUMMARY
Medical Problems       Resolved Problems  Date Reviewed: 1/29/2024   None       Discharging Physician / Practitioner: Blanca Staley MD  PCP: Sinan Palacios PA-C  Admission Date:   Admission Orders (From admission, onward)       Ordered        01/24/24 0949  Inpatient Admission  Once                          Discharge Date: 02/02/24    Principal discharge diagnoses:  Left frontal meningioma status postresection  Lung nodules  Hepatic cysts  Paraspinal intramuscular masses on MRI lumbar spine with morphology most compatible with hemangiomas  Gallbladder nodules most compatible with cholesterolosis/tiny polyps needing confirmation with right upper quadrant ultrasound  Hypertension    Secondary diagnoses:  Hypothyroidism  Anxiety    Consultations During Hospital Stay:  Neurosurgery  Medical oncology    Procedures Performed:   CTA head with and without contrast - Large, 3.6 cm dural based extra-axial mass with associated enhancement in the left middle cranial fossa correlates to the MRI performed the day earlier. There is surrounding vasogenic edema and 0.7 cm of associated right-sided subfalcine herniation. Imaging features are most compatible with meningioma. Exuberant arterial supply to the left middle cranial fossa meningioma likely from the left middle meningeal artery which is slightly hypertrophied on the left compared to the right. A prominent draining cortical vein empties some of the enhancement of the tumor along the superior margin draining into a cortical vein running over the anterior lateral aspects of the left frontal lobe. Significant associated mass effect in the left MCA bifurcation region and left MCA candelabra which are elevated along the posterior superior margin of this mass.  CT chest/abdomen/pelvis with contrast - No evidence of a primary malignancy in the chest, abdomen, or pelvis. Multiple bilateral pulmonary nodules measuring 4 mm or less are indeterminate in the absence of comparison  imaging. Given ongoing malignancy work-up, recommend follow-up chest CT in 3 months to exclude metastatic disease. Multiple hypoattenuating hepatic lesions similarly are indeterminate in the absence of comparison imaging. The largest of these, approximately 1 cm, measure slightly above fluid density. In light of ongoing malignancy work-up, recommend further characterization with contrast-enhanced abdominal MRI. Multiple ill-defined foci of enhancement in the lumbar paraspinal musculature, at least 1 of these measuring 1.6 cm does appear somewhat rounded and masslike. Metastatic disease cannot be excluded. The more superior of these lesions should be included in the field-of-view on the recommended abdominal MRI which may aid in further characterization. PET/CT may also be considered for further assessment.  MRI abdomen with and without contrast - No findings suspicious for malignancy in the abdomen. Tiny hepatic cysts, considered benign findings. Evidence of hepatic iron deposition. Correlate for prior blood transfusions and hemochromatosis. Findings most compatible with gallbladder cholesterolosis/tiny polyps, typically benign. Recommend confirmation with right upper quadrant ultrasound.  MRI lumbar spine with and without contrast - Paraspinal intramuscular masses. Imaging morphology most compatible with hemangiomas. Myxoma, sarcoma and metastases are considered less likely. No evidence of bone or intracanalicular neoplasm. Minimal degenerative disc disease. L4-L5 facet osteoarthritis with minimal degenerative anterolisthesis. No stenosis.  Embolization of left middle meningeal artery for intracranial mass on 1/29/2024  Resection of meningioma on 1/30/2024    Significant Findings / Test Results:   Pathology from resection of intracranial mass - Spindle/epithelioid lesion, favor meningioma.     Incidental Findings:    CT chest/abdomen/pelvis with multiple bilateral pulmonary nodules measuring 4 mm or less are  indeterminate in the absence of comparison imaging. Given ongoing malignancy work-up, recommend follow-up chest CT in 3 months to exclude metastatic disease.    MRI abdomen with Findings most compatible with gallbladder cholesterolosis/tiny polyps, typically benign. Recommend confirmation with right upper quadrant ultrasound.    I reviewed the above mentioned incidental findings with the patient and/or family and they expressed understanding.    Hospital Course:   Mary Ashby is a 63 y.o. female patient who originally presented to the hospital on 1/24/2024 due to abnormal MRI noted as outpatient.  MRI was ordered by her primary care physician for episodes of receptive aphasia.  MRI done on 1/23/2024 showed 4.3 cm probable meningioma in anterior aspect of left middle cranial fossa with small dural feeder vessels, diffuse surrounding vasogenic edema (left frontal, left subinsular, left posterior limb of internal capsule, left parietal, and left temporal lobes), compressive mass effect on adjacent left temporal lobe and left lateral ventricle, 0.7 cm rightward midline shift, left uncal herniation, and rightward shift of upper brainstem.  She is a [employer] and declines use of blood products.  She was explained that this was a highly vascular lesion.  She initially underwent embolization on 1/29/2024 of her left middle meningeal artery followed by resection of mass on 1/30/2024.  MARYSE drain was removed on 1/31/2024.  She was advised to continue Keppra for total of 7 days postoperatively.  Her home Aspirin was held and should not be restarted until cleared by neurosurgery team.  They anticipate at least a 2-week hold of Aspirin.  She was discharged on a steroid taper.   CT chest/abdomen/pelvis showed multiple pulmonary nodules, hepatic lesions and ill-defined foci of enhancement in the lumbar paraspinal musculature.  This was reviewed by medical oncology and MRI was ordered as above with benign-appearing  "findings.  She will follow-up with Dr. De Los Santos from medical oncology as an outpatient.   She was found to be hypertensive and was placed on hydralazine 25 mg 3 times daily.    Condition at Discharge: stable    Discharge Day Visit / Exam:   Subjective:    Vitals: Blood Pressure: 164/80 (02/02/24 1313)  Pulse: 73 (02/02/24 0900)  Temperature: 98.4 °F (36.9 °C) (02/02/24 0900)  Temp Source: Oral (02/02/24 0900)  Respirations: 16 (02/02/24 0900)  Height: 5' 2\" (157.5 cm) (01/29/24 1614)  Weight - Scale: 64.4 kg (141 lb 14.4 oz) (02/01/24 0600)  SpO2: 96 % (02/02/24 0900)  Exam:   Physical Exam  Vitals reviewed.   HENT:      Head: Normocephalic.      Nose: Nose normal.      Mouth/Throat:      Mouth: Mucous membranes are moist.   Eyes:      Extraocular Movements: Extraocular movements intact.   Cardiovascular:      Rate and Rhythm: Normal rate and regular rhythm.   Pulmonary:      Effort: Pulmonary effort is normal. No respiratory distress.      Breath sounds: Normal breath sounds. No wheezing.   Abdominal:      General: Bowel sounds are normal. There is no distension.      Palpations: Abdomen is soft.      Tenderness: There is no abdominal tenderness.   Musculoskeletal:         General: No swelling.      Cervical back: Neck supple.   Skin:     General: Skin is warm and dry.   Neurological:      General: No focal deficit present.      Mental Status: She is alert.   Psychiatric:         Mood and Affect: Mood normal.         Behavior: Behavior normal.          Discussion with Family: Updated  (daughter) via phone.    Discharge instructions/Information to patient and family:   See after visit summary for information provided to patient and family.      Provisions for Follow-Up Care:  See after visit summary for information related to follow-up care and any pertinent home health orders.      Mobility at time of Discharge:   Basic Mobility Inpatient Raw Score: 24  JH-HLM Goal: 8: Walk 250 feet or more  JH-HLM " Achieved: 7: Walk 25 feet or more  HLM Goal NOT achieved. Continue to encourage mobility in post discharge setting.     Disposition:   Home    Planned Readmission: No     Discharge Statement:  I spent 40 minutes discharging the patient. This time was spent on the day of discharge. I had direct contact with the patient on the day of discharge. Greater than 50% of the total time was spent examining patient, answering all patient questions, arranging and discussing plan of care with patient as well as directly providing post-discharge instructions.  Additional time then spent on discharge activities.    Discharge Medications:  See after visit summary for reconciled discharge medications provided to patient and/or family.      **Please Note: This note may have been constructed using a voice recognition system**

## 2024-02-05 PROCEDURE — 88342 IMHCHEM/IMCYTCHM 1ST ANTB: CPT | Performed by: PATHOLOGY

## 2024-02-05 PROCEDURE — 88360 TUMOR IMMUNOHISTOCHEM/MANUAL: CPT | Performed by: PATHOLOGY

## 2024-02-05 PROCEDURE — 88307 TISSUE EXAM BY PATHOLOGIST: CPT | Performed by: PATHOLOGY

## 2024-02-05 NOTE — UTILIZATION REVIEW
NOTIFICATION OF ADMISSION DISCHARGE   This is a Notification of Discharge from [facility]. Please be advised that this patient has been discharge from our facility. Below you will find the admission and discharge date and time including the patient’s disposition.   UTILIZATION REVIEW CONTACT:  Paddy Villagomez  Utilization   Network Utilization Review Department  Phone: [phone] x carefully listen to the prompts. All voicemails are confidential.  Email: [email or web address]     ADMISSION INFORMATION  PRESENTATION DATE: 1/24/2024  9:44 AM  OBERVATION ADMISSION DATE:   INPATIENT ADMISSION DATE: 1/24/24  9:44 AM   DISCHARGE DATE: 2/2/2024  1:20 PM   DISPOSITION:Home/Self Care    Network Utilization Review Department  ATTENTION: Please call with any questions or concerns to [phone] and carefully listen to the prompts so that you are directed to the right person. All voicemails are confidential.   For Discharge needs, contact Care Management DC Support Team at [phone] opt. 2  Send all requests for admission clinical reviews, approved or denied determinations and any other requests to dedicated fax number below belonging to the campus where the patient is receiving treatment. List of dedicated fax numbers for the Facilities:  FACILITY NAME UR FAX NUMBER   ADMISSION DENIALS (Administrative/Medical Necessity) [phone]   DISCHARGE SUPPORT TEAM ([facility]) [phone]   PARENT CHILD HEALTH (Maternity/NICU/Pediatrics) [phone]   [facility] [phone]   [facility] [phone]   [facility] [phone]   [facility] [phone]   [facility] [phone]   [facility] [phone]   [facility] [phone]   [facility] [phone]   [facility]  [facility] [phone]   [facility] [phone]   [facility] [phone]   [facility] [phone]

## 2024-02-06 ENCOUNTER — TELEPHONE (OUTPATIENT)
Dept: HEMATOLOGY ONCOLOGY | Facility: CLINIC | Age: 64
End: 2024-02-06

## 2024-02-09 ENCOUNTER — TELEPHONE (OUTPATIENT)
Dept: NEUROSURGERY | Facility: CLINIC | Age: 64
End: 2024-02-09

## 2024-02-09 NOTE — TELEPHONE ENCOUNTER
Called patient to see how she is doing after surgery. Patient reports she is doing well overall and denies any incisional issues or fevers. Patients daughter had phoned the nurse line this morning as patient woke up with a rash.  Patient states the rash is all over and is not itchy. Daughter is sending a picture through system to DEMETRIA Scott who had been speaking with daughter regarding this issue. Patient denies any issues breathing.  Stating that she is staying at her daughter and perhaps it is a reaction to something she ate there or something in her house that she may be allergic to.  Patient is able to ambulate around the house and complete ADLs. Educated the patient about the importance of preventing blood clots and provided measures how to prevent them.     Patient has moved her bowels since the surgery. Encouraged patient to take an over the counter stool softener, if she is taking narcotic pain medication. Encouraged fiber intake and fluids.    Reviewed incision care with the patient. Advised that after three days she may take a shower and gently wash the surgical site with soap and water. Use clean wash cloth, towels, and clothing. Do not submerge in water until cleared by the surgeon. Do not apply any creams, ointments, or lotions to the site.  Patient is aware to call the office if any redness, swelling, drainage, dehiscence of incision, or fever >100 F occurs.    Patient is aware to call the office if any concerns or questions may arise. Reminded patient of her upcoming appointments with the date/time/location. Patient was appreciative for the call.

## 2024-02-09 NOTE — TELEPHONE ENCOUNTER
Received call on nurseline from patient's daughter reporting that her mother has a new rash that she noticed in the shower. She denies itching. Says the rash is on her chest, neck, and armpits. Of note, patient has been staying at her daughters house since she has been discharged. Awaiting photos however also urge her to contact PCP.

## 2024-02-10 ENCOUNTER — OFFICE VISIT (OUTPATIENT)
Dept: URGENT CARE | Facility: CLINIC | Age: 64
End: 2024-02-10
Payer: COMMERCIAL

## 2024-02-10 VITALS
TEMPERATURE: 99.6 F | OXYGEN SATURATION: 97 % | RESPIRATION RATE: 18 BRPM | HEART RATE: 79 BPM | DIASTOLIC BLOOD PRESSURE: 77 MMHG | SYSTOLIC BLOOD PRESSURE: 140 MMHG

## 2024-02-10 DIAGNOSIS — R21 RASH: Primary | ICD-10-CM

## 2024-02-10 PROCEDURE — 99213 OFFICE O/P EST LOW 20 MIN: CPT

## 2024-02-10 NOTE — PROGRESS NOTES
[facility]    NAME: Mary Ashby is a 63 y.o. female  : 1960    MRN: [id number]  DATE: February 10, 2024  TIME: 10:44 AM    Assessment and Plan   Rash [R21]  1. Rash          Unclear reason for rash  No clear viral cause leading to symptoms though appearance appears viral exathem.   She has been in the hospital -- unlikely lyme disease EM.   Not itchy or painful.  At this time take antihistamine and supportive care. She is weaning off steroids       Patient Instructions     Take Allegra or Claritin for symptoms     Follow up with PCP in 5-7 days if symptoms are not improving or sooner with fever, thick yellow pus drainage, or any other concern for infection.      Chief Complaint     Chief Complaint   Patient presents with    Rash     Started yesterday and has gotten significantly worse. Denies itch and or pain. Has been at [employer] house recently post sx. But denies allergies or sensitivities to environment in the past.         History of Present Illness       Presents with symptoms of rash that began yesterday. Area is not painful or itchy. Denies known allergies previously. She has been weaning off decadron post surgery. She did contact neurosurgeon's office and they don't think it's from any of the medicines from her tumor resection surgery as she has been on them for 2 weeks.         Review of Systems   Review of Systems   Constitutional:  Negative for chills and fever.   HENT:  Negative for congestion and sore throat.    Respiratory:  Negative for cough and shortness of breath.    Cardiovascular:  Negative for chest pain.   Gastrointestinal:  Negative for abdominal pain.   Musculoskeletal:  Negative for myalgias.   Skin:  Positive for rash.   Psychiatric/Behavioral:  Negative for confusion.          Current Medications       Current Outpatient Medications:     acetaminophen (TYLENOL) 325 mg tablet, Take 3 tablets (975 mg total) by mouth every 8 (eight) hours for 10 days, Disp: 90 tablet,  Rfl: 0    ascorbic acid (VITAMIN C) 500 MG tablet, Take 1 tablet (500 mg total) by mouth 2 (two) times a day for 4 days, Disp: 8 tablet, Rfl: 0    dexamethasone (DECADRON) 2 mg tablet, Take 2 tablets (4 mg total) by mouth every 8 (eight) hours for 1 day, THEN 1 tablet (2 mg total) every 6 (six) hours for 2 days, THEN 1 tablet (2 mg total) every 8 (eight) hours for 2 days, THEN 1 tablet (2 mg total) every 12 (twelve) hours for 2 days, THEN 1 tablet (2 mg total) every 24 hours for 2 days., Disp: 26 tablet, Rfl: 0    docusate sodium (COLACE) 100 mg capsule, Take 1 capsule (100 mg total) by mouth 2 (two) times a day for 10 days, Disp: 20 capsule, Rfl: 0    FLUoxetine (PROzac) 20 mg capsule, Take 1 capsule (20 mg total) by mouth daily, Disp: 90 capsule, Rfl: 1    hydrALAZINE (APRESOLINE) 25 mg tablet, Take 1 tablet (25 mg total) by mouth every 8 (eight) hours, Disp: 90 tablet, Rfl: 0    levothyroxine 75 mcg tablet, Take 1 tablet (75 mcg total) by mouth daily, Disp: 90 tablet, Rfl: 1    pantoprazole (PROTONIX) 40 mg tablet, Take 1 tablet (40 mg total) by mouth daily in the early morning for 10 days, Disp: 10 tablet, Rfl: 0    senna (SENOKOT) 8.6 mg, Take 1 tablet (8.6 mg total) by mouth daily for 10 days, Disp: 10 tablet, Rfl: 0    vitamin A 3 MG (70452 UT) capsule, Take 2 capsules (20,000 Units total) by mouth daily for 4 days, Disp: 8 capsule, Rfl: 0    zinc sulfate (ZINCATE) 220 mg capsule, Take 1 capsule (220 mg total) by mouth daily for 4 days, Disp: 4 capsule, Rfl: 0    Current Allergies     Allergies as of 02/10/2024    (No Known Allergies)            The following portions of the patient's history were reviewed and updated as appropriate: allergies, current medications, past family history, past medical history, past social history, past surgical history and problem list.     Past Medical History:   Diagnosis Date    Arthritis     BRCA1 negative     Disease of thyroid gland     Endometrial cancer (HCC) 1991     Thyroid disease     Uterus cancer (HCC)        Past Surgical History:   Procedure Laterality Date    CRANIOTOMY Left 1/30/2024    Procedure: IMAGE GUIDED LEFT PTERIONAL CRANIOTOMY FOR TUMOR;  Surgeon: Placido Lowery MD;  Location: [facility];  Service: Neurosurgery    HYSTERECTOMY      still has ovaries    IR CEREBRAL ANGIOGRAPHY / INTERVENTION  1/29/2024       Family History   Problem Relation Age of Onset    Breast cancer Mother 50    No Known Problems Father     Cancer Sister     No Known Problems Daughter     No Known Problems Maternal Grandmother     No Known Problems Maternal Grandfather     No Known Problems Paternal Grandmother     No Known Problems Paternal Grandfather     Lymphoma Brother     Melanoma Brother 50    No Known Problems Son     Skin cancer Maternal Aunt     Breast cancer Maternal Aunt     Colon cancer Paternal Uncle     Breast cancer Cousin          Medications have been verified.        Objective   /77   Pulse 79   Temp 99.6 °F (37.6 °C)   Resp 18   LMP  (LMP Unknown)   SpO2 97%        Physical Exam     Physical Exam  Vitals reviewed.   Constitutional:       Appearance: Normal appearance.   Cardiovascular:      Rate and Rhythm: Normal rate and regular rhythm.      Pulses: Normal pulses.      Heart sounds: Normal heart sounds. No murmur heard.  Pulmonary:      Effort: Pulmonary effort is normal. No respiratory distress.      Breath sounds: Normal breath sounds.   Skin:     General: Skin is warm and dry.      Capillary Refill: Capillary refill takes less than 2 seconds.      Findings: Rash present.      Comments: Widespread rash to neck, back, chest, and face area. Mainly circular erythema with some circular lesions with central clearance. No wheals present. No clear larger lesion. No drainage from the site.    Neurological:      General: No focal deficit present.      Mental Status: She is alert and oriented to person, place, and time.   Psychiatric:         Mood and Affect: Mood  normal.         Behavior: Behavior normal.

## 2024-02-14 ENCOUNTER — OFFICE VISIT (OUTPATIENT)
Dept: NEUROSURGERY | Facility: CLINIC | Age: 64
End: 2024-02-14

## 2024-02-14 VITALS
DIASTOLIC BLOOD PRESSURE: 82 MMHG | BODY MASS INDEX: 25.95 KG/M2 | SYSTOLIC BLOOD PRESSURE: 142 MMHG | OXYGEN SATURATION: 99 % | TEMPERATURE: 98.5 F | HEIGHT: 62 IN | HEART RATE: 71 BPM | WEIGHT: 141 LBS

## 2024-02-14 DIAGNOSIS — D32.9 MENINGIOMA (HCC): Primary | ICD-10-CM

## 2024-02-14 PROCEDURE — 99024 POSTOP FOLLOW-UP VISIT: CPT | Performed by: NEUROLOGICAL SURGERY

## 2024-02-14 NOTE — PROGRESS NOTES
Patient Id: Mary Ashby is a 63 y.o. female        Handedness: Right      Assessment/Plan:    Diagnoses and all orders for this visit:    Meningioma (HCC)        Discussion and summary:   1.  WHO grade 1 sphenoid wing meningioma status post resection 1/30/2024.  Now 2 weeks status post resection.  She has been doing well.  Continues to have some jaw pain as well as some left eye swelling.  Otherwise been doing well.  No seizures.  Her speech has improved.  We discussed the natural history and diagnosis of WHO grade 1 meningiomas plan for repeat imaging at 3 months, 6 months, and 1 year in accordance with NCCN guidelines.  I will see her back in 4 weeks for clinical evaluation.      Chief Complaint: Post-op (2 week Path result/)        HPI:   This is a very pleasant 63-year-old female who presented with speech difficulties and was found to have a large left sphenoid wing meningioma after obtaining an outpatient imaging study.  She ultimately underwent embolization and resection on January 30, 2024.  She is now 2 weeks after surgery and doing well.  She has been increasing her activities, but still has some pain with chewing as well as some left eye swelling.  Overall she notes some improvement in her daughter notes some improvement with speech although it is not completely back to her previous baseline.    Her past medical history is significant for hypertension.    She is .  She is a [employer] and has outlined her previous wishes regarding blood transfusion if necessary.  She has one daughter age 36 and one son who is 38.  She is not currently employed.  She denies tobacco and illicit drug use.  She has not had any alcohol since surgery.  She denies any Neurologic family history.    Review of systems obtained by the MA reviewed and updated below.      Review of Systems   Constitutional:  Positive for fatigue (improving).   HENT:  Negative for tinnitus.         Left sided jaw pain/swelling     Eyes:   Positive for visual disturbance (left eye- burry vision).   Respiratory: Negative.     Cardiovascular: Negative.    Gastrointestinal: Negative.    Endocrine: Negative.    Musculoskeletal:  Positive for gait problem (unbalanced- no recent falls). Negative for neck pain.   Skin:  Positive for wound (incision).   Neurological:  Positive for dizziness (intermittent), speech difficulty (slight word finding/association), weakness (BLE) and light-headedness (intermittent). Negative for tremors, seizures, numbness and headaches.   Psychiatric/Behavioral:  Positive for decreased concentration. Negative for confusion and sleep disturbance.        Physical Exam  Vitals:    02/14/24 1049   BP: 142/82   Pulse: 71   Temp: 98.5 °F (36.9 °C)   SpO2: 99%   Speech improving.  No significant paraphasic errors.  Incision healing well.    The following portions of the patient's history were reviewed and updated as appropriate: allergies, current medications, past family history, past medical history, past social history, past surgical history, and problem list.    Active Ambulatory Problems     Diagnosis Date Noted    Hypothyroidism 07/21/2016    Lipid screening 05/01/2018    Need for hepatitis C screening test 05/01/2018    Anxiety 04/26/2023    Meningioma (HCC) 01/23/2024    Lung nodules 01/25/2024    Hepatic lesion 01/25/2024    Abnormal CT scan, lumbar spine 01/25/2024    Gallbladder polyp 01/26/2024    Primary hypertension 02/02/2024     Resolved Ambulatory Problems     Diagnosis Date Noted    Vitamin D deficiency 07/28/2016    Encounter for gynecological examination 05/15/2018    Encounter for gynecological examination (general) (routine) without abnormal findings 11/22/2022     Past Medical History:   Diagnosis Date    Arthritis     BRCA1 negative     Disease of thyroid gland     Endometrial cancer (HCC) 1991    Hypertension January 3 2023    Thyroid disease     Uterus cancer (HCC)        Past Surgical History:   Procedure  Laterality Date    CRANIOTOMY Left 1/30/2024    Procedure: IMAGE GUIDED LEFT PTERIONAL CRANIOTOMY FOR TUMOR;  Surgeon: Placido Lowery MD;  Location: [facility];  Service: Neurosurgery    HYSTERECTOMY      still has ovaries    IR CEREBRAL ANGIOGRAPHY / INTERVENTION  1/29/2024         Current Outpatient Medications:     FLUoxetine (PROzac) 20 mg capsule, Take 1 capsule (20 mg total) by mouth daily, Disp: 90 capsule, Rfl: 1    hydrALAZINE (APRESOLINE) 25 mg tablet, Take 1 tablet (25 mg total) by mouth every 8 (eight) hours, Disp: 90 tablet, Rfl: 0    levothyroxine 75 mcg tablet, Take 1 tablet (75 mcg total) by mouth daily, Disp: 90 tablet, Rfl: 1    ascorbic acid (VITAMIN C) 500 MG tablet, Take 1 tablet (500 mg total) by mouth 2 (two) times a day for 4 days, Disp: 8 tablet, Rfl: 0    docusate sodium (COLACE) 100 mg capsule, Take 1 capsule (100 mg total) by mouth 2 (two) times a day for 10 days, Disp: 20 capsule, Rfl: 0    pantoprazole (PROTONIX) 40 mg tablet, Take 1 tablet (40 mg total) by mouth daily in the early morning for 10 days, Disp: 10 tablet, Rfl: 0    senna (SENOKOT) 8.6 mg, Take 1 tablet (8.6 mg total) by mouth daily for 10 days, Disp: 10 tablet, Rfl: 0    vitamin A 3 MG (97976 UT) capsule, Take 2 capsules (20,000 Units total) by mouth daily for 4 days, Disp: 8 capsule, Rfl: 0    zinc sulfate (ZINCATE) 220 mg capsule, Take 1 capsule (220 mg total) by mouth daily for 4 days, Disp: 4 capsule, Rfl: 0    Results/Data: We reviewed her preoperative and postoperative imaging in detail.

## 2024-02-16 ENCOUNTER — OFFICE VISIT (OUTPATIENT)
Dept: FAMILY MEDICINE CLINIC | Facility: CLINIC | Age: 64
End: 2024-02-16
Payer: COMMERCIAL

## 2024-02-16 VITALS
SYSTOLIC BLOOD PRESSURE: 152 MMHG | WEIGHT: 136 LBS | OXYGEN SATURATION: 99 % | TEMPERATURE: 98.1 F | HEIGHT: 62 IN | DIASTOLIC BLOOD PRESSURE: 84 MMHG | HEART RATE: 87 BPM | BODY MASS INDEX: 25.03 KG/M2

## 2024-02-16 DIAGNOSIS — I10 PRIMARY HYPERTENSION: ICD-10-CM

## 2024-02-16 DIAGNOSIS — K76.9 HEPATIC LESION: ICD-10-CM

## 2024-02-16 DIAGNOSIS — D32.9 MENINGIOMA (HCC): Primary | ICD-10-CM

## 2024-02-16 DIAGNOSIS — R93.2 ABNORMAL CT SCAN, GALLBLADDER: ICD-10-CM

## 2024-02-16 DIAGNOSIS — R91.1 LUNG NODULE: ICD-10-CM

## 2024-02-16 PROCEDURE — 99495 TRANSJ CARE MGMT MOD F2F 14D: CPT | Performed by: PHYSICIAN ASSISTANT

## 2024-02-16 RX ORDER — AMLODIPINE BESYLATE 5 MG/1
5 TABLET ORAL DAILY
Qty: 30 TABLET | Refills: 0 | Status: SHIPPED | OUTPATIENT
Start: 2024-02-16

## 2024-02-16 NOTE — PROGRESS NOTES
"Assessment & Plan     1. Meningioma (HCC)    2. Lung nodule  -     CT lung nodule follow-up; Future; Expected date: 05/16/2024    3. Abnormal CT scan, gallbladder  -     US right upper quadrant; Future; Expected date: 02/16/2024    4. Primary hypertension  -     amLODIPine (NORVASC) 5 mg tablet; Take 1 tablet (5 mg total) by mouth daily    5. Hepatic lesion    Hospital course, imaging, labs and surgery reviewed. Appropriate follow up studies ordered. Continue close follow up with NS. Contact NS regarding restarting ASA. Switch from hydralazine to amlodipine, send BP log in 2 weeks. Non focal neuro exam today. She is doing quite well without any significant pain or new/worsening neurologic sx. 2 month follow up, will repeat MOCA at that time.  Follow up earlier prn     Subjective     Transitional Care Management Review:   Mary Ashby is a 63 y.o. female here for TCM follow up.     During the TCM phone call patient stated:  TCM Call     Date and time call was made  2/5/2024  2:00 PM    Hospital care reviewed  Records reviewed    Patient was hospitialized at  [facility]    Date of Admission  01/24/24    Date of discharge  02/02/24    Diagnosis  Meningioma    Disposition  Home    Were the patients medications reviewed and updated  No    Current Symptoms  None      TCM Call     Post hospital issues  Reduced activity    Should patient be enrolled in anticoag monitoring?  No    Scheduled for follow up?  Yes    I have advised the patient to call PCP with any new or worsening symptoms  [facility]        Hospital course from discharge summary written by ariel patterson MD on 2/2/2024  \"Mary Ashby is a 63 y.o. female patient who originally presented to the hospital on 1/24/2024 due to abnormal MRI noted as outpatient.  MRI was ordered by her primary care physician for episodes of receptive aphasia.  MRI done on 1/23/2024 showed 4.3 cm probable meningioma in anterior aspect of left middle cranial fossa with " "small dural feeder vessels, diffuse surrounding vasogenic edema (left frontal, left subinsular, left posterior limb of internal capsule, left parietal, and left temporal lobes), compressive mass effect on adjacent left temporal lobe and left lateral ventricle, 0.7 cm rightward midline shift, left uncal herniation, and rightward shift of upper brainstem.  She is a [employer] and declines use of blood products.  She was explained that this was a highly vascular lesion.  She initially underwent embolization on 1/29/2024 of her left middle meningeal artery followed by resection of mass on 1/30/2024.  MARYSE drain was removed on 1/31/2024.  She was advised to continue Keppra for total of 7 days postoperatively.  Her home Aspirin was held and should not be restarted until cleared by neurosurgery team.  They anticipate at least a 2-week hold of Aspirin.  She was discharged on a steroid taper.   CT chest/abdomen/pelvis showed multiple pulmonary nodules, hepatic lesions and ill-defined foci of enhancement in the lumbar paraspinal musculature.  This was reviewed by medical oncology and MRI was ordered as above with benign-appearing findings.  She will follow-up with Dr. De Los Santos from medical oncology as an outpatient.   She was found to be hypertensive and was placed on hydralazine 25 mg 3 times daily.\"    TCM 2/16/2024    Doing well since d/c. No new/worsening neurologic complaints. Had some eye swelling/L jaw pain which NS shared was expected. No hearing change. Vision change improving. No fevers. Hydralazine TID dosing is not optimal for pt. Did not take this morning. /84. She has followed up with NS since d/c and has 4 week follow up scheduled.       Review of Systems   Constitutional:  Negative for chills, fatigue and fever.   HENT:  Negative for congestion, ear pain, hearing loss, nosebleeds, postnasal drip, rhinorrhea, sinus pressure, sinus pain, sneezing and sore throat.    Eyes:  Negative for pain, discharge, " "itching and visual disturbance.   Respiratory:  Negative for cough, chest tightness, shortness of breath and wheezing.    Cardiovascular:  Negative for chest pain, palpitations and leg swelling.   Gastrointestinal:  Negative for abdominal pain, blood in stool, constipation, diarrhea, nausea and vomiting.   Genitourinary:  Negative for frequency and urgency.   Neurological:  Positive for speech difficulty (improving). Negative for dizziness, seizures, syncope, facial asymmetry, light-headedness, numbness and headaches.       Objective     /84 (BP Location: Left arm, Patient Position: Sitting, Cuff Size: Standard)   Pulse 87   Temp 98.1 °F (36.7 °C)   Ht 5' 2\" (1.575 m)   Wt 61.7 kg (136 lb)   LMP  (LMP Unknown)   SpO2 99%   BMI 24.87 kg/m²      Physical Exam  Vitals and nursing note reviewed.   Constitutional:       General: She is not in acute distress.     Appearance: Normal appearance. She is not ill-appearing.   HENT:      Head: Normocephalic.      Comments: Transverse cranial incision with routine healing  Eyes:      Extraocular Movements: Extraocular movements intact.      Pupils: Pupils are equal, round, and reactive to light.   Pulmonary:      Effort: Pulmonary effort is normal. No respiratory distress.   Musculoskeletal:      Cervical back: Normal range of motion.   Skin:     General: Skin is warm and dry.   Neurological:      General: No focal deficit present.      Mental Status: She is alert and oriented to person, place, and time.      Cranial Nerves: No cranial nerve deficit.      Sensory: No sensory deficit.      Motor: No weakness.      Coordination: Coordination normal.      Gait: Gait normal.      Deep Tendon Reflexes: Reflexes normal.   Psychiatric:         Mood and Affect: Mood is anxious.       Medications have been reviewed by provider in current encounter    Sinan Palacios PA-C         "

## 2024-02-21 PROBLEM — Z13.220 LIPID SCREENING: Status: RESOLVED | Noted: 2018-05-01 | Resolved: 2024-02-21

## 2024-02-21 PROBLEM — Z11.59 NEED FOR HEPATITIS C SCREENING TEST: Status: RESOLVED | Noted: 2018-05-01 | Resolved: 2024-02-21

## 2024-02-28 ENCOUNTER — HOSPITAL ENCOUNTER (OUTPATIENT)
Dept: ULTRASOUND IMAGING | Facility: HOSPITAL | Age: 64
Discharge: HOME | End: 2024-02-28
Payer: COMMERCIAL

## 2024-02-28 DIAGNOSIS — R93.2 ABNORMAL CT SCAN, GALLBLADDER: ICD-10-CM

## 2024-02-28 PROCEDURE — 76705 ECHO EXAM OF ABDOMEN: CPT

## 2024-03-05 ENCOUNTER — OFFICE VISIT (OUTPATIENT)
Dept: HEMATOLOGY ONCOLOGY | Facility: CLINIC | Age: 64
End: 2024-03-05
Payer: COMMERCIAL

## 2024-03-05 VITALS
OXYGEN SATURATION: 99 % | DIASTOLIC BLOOD PRESSURE: 80 MMHG | WEIGHT: 138 LBS | RESPIRATION RATE: 16 BRPM | HEART RATE: 87 BPM | TEMPERATURE: 97.8 F | HEIGHT: 62 IN | BODY MASS INDEX: 25.4 KG/M2 | SYSTOLIC BLOOD PRESSURE: 130 MMHG

## 2024-03-05 DIAGNOSIS — E03.8 OTHER SPECIFIED HYPOTHYROIDISM: ICD-10-CM

## 2024-03-05 DIAGNOSIS — D32.9 MENINGIOMA (HCC): ICD-10-CM

## 2024-03-05 DIAGNOSIS — E83.19 IRON OVERLOAD: ICD-10-CM

## 2024-03-05 DIAGNOSIS — R91.8 LUNG NODULES: Primary | ICD-10-CM

## 2024-03-05 DIAGNOSIS — M89.9 LESION OF LUMBAR SPINE: ICD-10-CM

## 2024-03-05 PROCEDURE — 99204 OFFICE O/P NEW MOD 45 MIN: CPT | Performed by: INTERNAL MEDICINE

## 2024-03-05 RX ORDER — LEVOTHYROXINE SODIUM 0.07 MG/1
75 TABLET ORAL DAILY
Qty: 90 TABLET | Refills: 1 | Status: SHIPPED | OUTPATIENT
Start: 2024-03-05

## 2024-03-05 NOTE — PROGRESS NOTES
Hematology/Oncology Outpatient Follow-up  Mary Ashby 63 y.o. female 1960 [id number]    Date:  3/5/2024        Assessment and Plan:  1. Lung nodules  We did discuss the potential etiology of the multiple bilateral pulmonary nodules and which are subcentimeter about 4 mm or less in size.  She was told that this is possibly benign in etiology.  A CT scan of the chest was recommended for follow-up of the pulmonary nodules in 3 months.  The CT scan was already ordered and needs to be done around the end of April or the beginning of May.    - Ambulatory Referral to Oncology Genetics; Future    2. Meningioma (HCC)  Status post resection of the left sphenoid wing to brain mass which showed meningioma, CNS WHO grade 1.  The patient was asked to follow-up with the neurosurgical team who is planning to pursue frequent brain MRI to rule out any hint of recurrence of the meningioma.    We did discuss the potential benefit of pursuing genetic oncology consultation since the patient has very positive family history for malignancy.  She also had endometrial cancer at early age.  She continues to have colon polyps and gallbladder polyps which may or may not be due to germline mutation.  The patient seems to be interested in discussing her family history and her own history with the genetic oncology team and pursuing genetic testing as well.    - Ambulatory Referral to Oncology Genetics; Future    3. Iron overload  Recent MRI of the abdomen showed increased iron deposition in the liver.  This could be a true finding.  We will check her iron panel and make a decision if hemochromatosis genetic testing would be necessary.    - CBC and differential; Future  - Comprehensive metabolic panel; Future  - Iron Panel (Includes Ferritin, Iron Sat%, Iron, and TIBC); Future  - Ferritin; Future    4. Lesion of lumbar spine  We did discuss the result of the lumbar MRI which showed paraspinal intramuscular masses which are most likely  compatible with hemangiomas.  However, other etiology cannot be ruled out entirely.  We did discuss pursuing another lumbar MRI in about 3 months from now for close follow-up as well.  We will then see the patient to discuss the results findings and act accordingly.    - MRI lumbar spine w wo contrast; Future  - Ambulatory Referral to Oncology Genetics; Future        HPI:  This is a 63-year-old female with a remote history of endometrial cancer status post hysterectomy in 1991, hypertension, thyroid disease, etc.  She stated that her family history is full of malignancy including breast cancer in her mother, maternal aunt and cousin.  Colon cancer parental uncle had melanoma in her brother.  The patient has a history of multiple benign colon polyps which requires colonoscopy on every other year basis.  The patient apparently complained about neurological symptoms including difficulties with her memory and finding words.  This resulted in an MRI of the brain which was done on 1/23/2024 and showed:    IMPRESSION:     4.3 cm probable meningioma in anterior aspect of left middle cranial fossa with small dural feeder vessels, diffuse surrounding vasogenic edema (left frontal, left subinsular, left posterior limb of internal capsule, left parietal, and left temporal   lobes), compressive mass effect on adjacent left temporal lobe and left lateral ventricle, 0.7 cm rightward midline shift, left uncal herniation, and rightward shift of upper brainstem. Recommend neurosurgical consultation for further evaluation.     NeuroQuant analysis was performed:   Normal study however this is confounded by a left middle cranial fossa mass and the results of this study are likely inconclusive but overall does not support neurodegeneration.    The patient was then immediately admitted to the hospital for further evaluation.  She did have CTA of the head which showed the large dural based extra-axial mass measuring 3.6 cm with surrounding  vasogenic edema.    CT chest abdomen pelvis on 1/24/2024 showed:  IMPRESSION:     1.  No evidence of a primary malignancy in the chest, abdomen, or pelvis.  2.  Multiple bilateral pulmonary nodules measuring 4 mm or less are indeterminate in the absence of comparison imaging. Given ongoing malignancy work-up, recommend follow-up chest CT in 3 months to exclude metastatic disease.  3.  Multiple hypoattenuating hepatic lesions similarly are indeterminate in the absence of comparison imaging. The largest of these, approximately 1 cm, measure slightly above fluid density. In light of ongoing malignancy work-up, recommend further   characterization with contrast-enhanced abdominal MRI.  4.  Multiple ill-defined foci of enhancement in the lumbar paraspinal musculature, at least 1 of these measuring 1.6 cm does appear somewhat rounded and masslike. Metastatic disease cannot be excluded. The more superior of these lesions should be   included in the field-of-view on the recommended abdominal MRI which may aid in further characterization. PET/CT may also be considered for further assessment.    The patient then had MRI of the abdomen for further evaluation of the hepatic cysts on 1/25/2024 which showed:  IMPRESSION:     No findings suspicious for malignancy in the abdomen.     Tiny hepatic cysts, considered benign findings. Evidence of hepatic iron deposition. Correlate for prior blood transfusions and hemochromatosis.     Findings most compatible with gallbladder cholesterolosis/tiny polyps, typically benign. Recommend confirmation with right upper quadrant ultrasound.    At the same day she also had an MRI of the lumbar spine for further evaluation of the lumbar paraspinal intramuscular lesions which showed:    IMPRESSION:     Paraspinal intramuscular masses. Imaging morphology most compatible with hemangiomas. Myxoma, sarcoma and metastases are considered less likely.     No evidence of bone or intracanalicular  neoplasm.     Minimal degenerative disc disease. L4-L5 facet osteoarthritis with minimal degenerative anterolisthesis. No stenosis.    She then had resection of the left sphenoid wing brain mass on 1/30/2024.  The pathology came back compatible with meningioma, CNS WHO grade 1.    She also had an ultrasound of the abdomen for further evaluation of the gallbladder polyps on 2/28/2024 which showed:  IMPRESSION:     Multiple gallbladder polyps measuring up to 6 mm. According to current consensus recommendations (SRU 2022; 000:1-12), for polyps of this size ( <=  6 mm) which have a low risk morphology, no follow-up is recommended.    Interval history:  The patient is recovering nicely from her recent craniotomy.  Her neurological symptoms seem to be improving.  She continues to feel some pressure behind her left eye.  Recent blood work on 2/2/2024 showed white cell count of 10.7 with normal hemoglobin and platelet count.  Creatinine 0.4 with normal calcium.  ROS: Review of Systems   Constitutional:  Positive for fatigue. Negative for chills and fever.   HENT:  Negative for ear pain and sore throat.    Eyes:  Negative for pain and visual disturbance.   Respiratory:  Negative for cough and shortness of breath.    Cardiovascular:  Negative for chest pain and palpitations.   Gastrointestinal:  Negative for abdominal pain and vomiting.   Genitourinary:  Negative for dysuria and hematuria.   Musculoskeletal:  Negative for arthralgias and back pain.   Skin:  Negative for color change and rash.   Neurological:  Positive for numbness. Negative for seizures and syncope.   Psychiatric/Behavioral:  Positive for sleep disturbance.    All other systems reviewed and are negative.      Past Medical History:   Diagnosis Date    Arthritis     BRCA1 negative     Disease of thyroid gland     Endometrial cancer (HCC) 1991    Hypertension January 3 2023    Thyroid disease     Uterus cancer (HCC)        Past Surgical History:   Procedure  Laterality Date    CRANIOTOMY Left 2024    Procedure: IMAGE GUIDED LEFT PTERIONAL CRANIOTOMY FOR TUMOR;  Surgeon: Placido Lowery MD;  Location: [facility];  Service: Neurosurgery    HYSTERECTOMY      still has ovaries    IR CEREBRAL ANGIOGRAPHY / INTERVENTION  2024       Social History     Socioeconomic History    Marital status: /Civil Union     Spouse name: None    Number of children: None    Years of education: None    Highest education level: None   Occupational History    None   Tobacco Use    Smoking status: Former     Current packs/day: 0.00     Types: Cigarettes     Quit date: 1984     Years since quittin.2    Smokeless tobacco: Never   Vaping Use    Vaping status: Never Used   Substance and Sexual Activity    Alcohol use: Yes     Alcohol/week: 3.0 standard drinks of alcohol     Types: 3 Glasses of wine per week     Comment: social    Drug use: No    Sexual activity: Not Currently     Partners: Male     Birth control/protection: Surgical     Comment: Hysterectomy    Other Topics Concern    None   Social History Narrative    None     Social Determinants of Health     Financial Resource Strain: Not on file   Food Insecurity: No Food Insecurity (2024)    Hunger Vital Sign     Worried About Running Out of Food in the Last Year: Never true     Ran Out of Food in the Last Year: Never true   Transportation Needs: No Transportation Needs (2024)    PRAPARE - Transportation     Lack of Transportation (Medical): No     Lack of Transportation (Non-Medical): No   Physical Activity: Not on file   Stress: Not on file   Social Connections: Not on file   Intimate Partner Violence: Not on file   Housing Stability: Low Risk  (2024)    Housing Stability Vital Sign     Unable to Pay for Housing in the Last Year: No     Number of Places Lived in the Last Year: 1     Unstable Housing in the Last Year: No       Family History   Problem Relation Age of Onset    Breast cancer Mother 50     "No Known Problems Father     Cancer Sister     No Known Problems Daughter     No Known Problems Maternal Grandmother     No Known Problems Maternal Grandfather     No Known Problems Paternal Grandmother     No Known Problems Paternal Grandfather     Lymphoma Brother     Melanoma Brother 50    No Known Problems Son     Skin cancer Maternal Aunt     Breast cancer Maternal Aunt     Colon cancer Paternal Uncle     Breast cancer Cousin        No Known Allergies      Current Outpatient Medications:     amLODIPine (NORVASC) 5 mg tablet, Take 1 tablet (5 mg total) by mouth daily, Disp: 30 tablet, Rfl: 0    FLUoxetine (PROzac) 20 mg capsule, Take 1 capsule (20 mg total) by mouth daily, Disp: 90 capsule, Rfl: 1    levothyroxine 75 mcg tablet, Take 1 tablet (75 mcg total) by mouth daily, Disp: 90 tablet, Rfl: 1    senna (SENOKOT) 8.6 mg, Take 1 tablet (8.6 mg total) by mouth daily for 10 days (Patient not taking: Reported on 3/5/2024), Disp: 10 tablet, Rfl: 0    vitamin A 3 MG (67454 UT) capsule, Take 2 capsules (20,000 Units total) by mouth daily for 4 days (Patient not taking: Reported on 3/5/2024), Disp: 8 capsule, Rfl: 0    zinc sulfate (ZINCATE) 220 mg capsule, Take 1 capsule (220 mg total) by mouth daily for 4 days (Patient not taking: Reported on 3/5/2024), Disp: 4 capsule, Rfl: 0      Physical Exam:  /80 (BP Location: Left arm, Patient Position: Sitting, Cuff Size: Adult)   Pulse 87   Temp 97.8 °F (36.6 °C)   Resp 16   Ht 5' 2\" (1.575 m)   Wt 62.6 kg (138 lb)   LMP  (LMP Unknown)   SpO2 99%   BMI 25.24 kg/m²     Physical Exam  Constitutional:       General: She is not in acute distress.     Appearance: She is well-developed. She is not diaphoretic.   HENT:      Head: Normocephalic and atraumatic.      Nose: Nose normal.   Eyes:      General: No scleral icterus.        Right eye: No discharge.         Left eye: No discharge.      Conjunctiva/sclera: Conjunctivae normal.      Pupils: Pupils are equal, " round, and reactive to light.   Neck:      Thyroid: No thyromegaly.      Vascular: No JVD.      Trachea: No tracheal deviation.   Cardiovascular:      Rate and Rhythm: Normal rate and regular rhythm.      Heart sounds: Normal heart sounds. No murmur heard.     No friction rub.   Pulmonary:      Effort: Pulmonary effort is normal. No respiratory distress.      Breath sounds: Normal breath sounds. No stridor. No wheezing or rales.   Chest:      Chest wall: No tenderness.   Abdominal:      General: There is no distension.      Palpations: Abdomen is soft. There is no hepatomegaly or splenomegaly.      Tenderness: There is no abdominal tenderness. There is no guarding or rebound.   Musculoskeletal:         General: No tenderness or deformity. Normal range of motion.      Cervical back: Normal range of motion and neck supple.   Lymphadenopathy:      Cervical: No cervical adenopathy.   Skin:     General: Skin is warm and dry.      Coloration: Skin is not pale.      Findings: No erythema or rash.   Neurological:      Mental Status: She is alert and oriented to person, place, and time.      Cranial Nerves: No cranial nerve deficit.      Coordination: Coordination normal.      Deep Tendon Reflexes: Reflexes are normal and symmetric.   Psychiatric:         Behavior: Behavior normal.         Thought Content: Thought content normal.         Judgment: Judgment normal.           Labs:  Lab Results   Component Value Date    WBC 10.78 (H) 02/02/2024    HGB 13.1 02/02/2024    HCT 38.8 02/02/2024    MCV 90 02/02/2024     02/02/2024     Lab Results   Component Value Date    K 4.4 02/02/2024     02/02/2024    CO2 28 02/02/2024    BUN 22 02/02/2024    CREATININE 0.48 (L) 02/02/2024    GLUF 92 01/17/2024    CALCIUM 8.8 02/02/2024    AST 19 01/25/2024    ALT 22 01/25/2024    ALKPHOS 65 01/25/2024    EGFR 106 02/02/2024     Lab Results   Component Value Date    TSH 2.14 04/22/2019       Patient voiced understanding and  agreement in the above discussion. Aware to contact our office with questions/symptoms in the interim.

## 2024-03-06 ENCOUNTER — TELEPHONE (OUTPATIENT)
Dept: HEMATOLOGY ONCOLOGY | Facility: CLINIC | Age: 64
End: 2024-03-06

## 2024-03-06 NOTE — TELEPHONE ENCOUNTER
I called Mary in response to a referral that was received for patient to establish care with Cancer Risk and Genetics.     Outreach was made to schedule a consultation.    I left a voicemail explaining the reason for my call and advised patient to call [facility] at [phone].  Another attempt will be made to contact patient.      Dear Mary,      Your health care provider referred you to the [facility] Cancer Risk and Genetics Program.  We were unable to reach you to schedule an appointment.  If you are interested in scheduling an appointment with one of our genetic counselors please reach out to our office at [phone]    Sincerely,    Cancer Risk and Genetics Program  [facility]

## 2024-03-06 NOTE — LETTER
Dear Mary,      Your health care provider referred you to the [facility] Cancer Risk and Genetics Program.  We were unable to reach you to schedule an appointment.  If you are interested in scheduling an appointment with one of our genetic counselors please reach out to our office at [phone]    Sincerely,    Cancer Risk and Genetics Program  [facility]

## 2024-03-11 ENCOUNTER — OFFICE VISIT (OUTPATIENT)
Dept: NEUROSURGERY | Facility: CLINIC | Age: 64
End: 2024-03-11

## 2024-03-11 VITALS
SYSTOLIC BLOOD PRESSURE: 120 MMHG | OXYGEN SATURATION: 98 % | WEIGHT: 132 LBS | TEMPERATURE: 97.5 F | RESPIRATION RATE: 18 BRPM | HEIGHT: 63 IN | DIASTOLIC BLOOD PRESSURE: 76 MMHG | HEART RATE: 74 BPM | BODY MASS INDEX: 23.39 KG/M2

## 2024-03-11 DIAGNOSIS — D32.9 MENINGIOMA (HCC): Primary | ICD-10-CM

## 2024-03-11 PROCEDURE — 99024 POSTOP FOLLOW-UP VISIT: CPT | Performed by: NEUROLOGICAL SURGERY

## 2024-03-11 NOTE — PROGRESS NOTES
Patient Id: Mary Ashby is a 63 y.o. female        Assessment/Plan:    Diagnoses and all orders for this visit:    Meningioma (HCC)  -     MRI brain w wo contrast; Future        Discussion and summary:   1.  WHO grade 1 sphenoid wing meningioma status post resection 1/30/2024.  Now 6 weeks status post resection.  She has been doing well.  No seizures.  She believes that her speech has improved.  She continues to have some jaw pain as well as some fatigue.  Denies any wound/incision issues.    We once again discussed natural history and diagnosis of WHO grade 1 meningiomas.  We will plan for repeat MRI in 6 to 8 weeks at her 3-month postsurgical visit in accordance with NCCN guidelines.         Chief Complaint: Post-op (6wks post op )      HPI:   This is a very pleasant 63-year-old female who presented with speech difficulties and was found to have a large left sphenoid wing meningioma after obtaining an outpatient imaging study.  She ultimately underwent embolization and resection on January 30, 2024.    She is now 6 weeks status post from surgery.  She has been doing well and increasing her activities.  She still has some fatigue and feels like her energy has plateaued.  She also has intermittent left eye swelling.  Otherwise she does feel like she has had some improvement since her last visit.  It is not as quick if she would like.  She has no other neurologic complaints.     Her past medical history is significant for hypertension.     She is .  She is a [employer] and has outlined her previous wishes regarding blood transfusion if necessary.  She has one daughter age 36 and one son who is 38.  She is not currently employed.  She denies tobacco and illicit drug use.  She has not had any alcohol since surgery.  She denies any Neurologic family history.     Review of systems obtained by the MA reviewed and updated below.      Review of Systems   Constitutional:  Positive for fatigue (still  improving).   HENT:  Positive for facial swelling (left side of face (jaw) still hurts to chew and cant open mouth too wide). Negative for tinnitus.    Eyes:  Positive for visual disturbance (blurry vison when she first wakes up and the left eye will be swollen).   Respiratory: Negative.     Cardiovascular: Negative.    Gastrointestinal: Negative.    Endocrine: Negative.    Genitourinary: Negative.    Musculoskeletal:  Positive for gait problem (has improved).   Skin: Negative.    Allergic/Immunologic: Negative.    Neurological:  Positive for headaches (more so pressure than an actual headache). Negative for dizziness, speech difficulty and weakness.   Hematological: Negative.    Psychiatric/Behavioral: Negative.  Negative for sleep disturbance.        Physical Exam  Vitals:    03/11/24 1039   BP: 120/76   Pulse: 74   Resp: 18   Temp: 97.5 °F (36.4 °C)   SpO2: 98%   She is well appearing.  Affect is appropriate. Body mass index is 23.38 kg/m².. She is awake alert and oriented.  Hearing and vision are grossly intact.   Her pupils are equal round reactive to light.  Her extraocular movements are intact.  Her face is symmetric.  Tongue is midline.  Facial sensation is intact and symmetric throughout.  Shoulder shrug is 5/5.  There is no drift or dysmetria.     She has full strength in her bilateral upper and lower extremities.    Her gait is normal.    Incision well-healed.      Normal respiratory effort.  Abdomen nondistended.  Radial pulses 2+.     The following portions of the patient's history were reviewed and updated as appropriate: allergies, current medications, past family history, past medical history, past social history, past surgical history, and problem list.    Active Ambulatory Problems     Diagnosis Date Noted    Hypothyroidism 07/21/2016    Anxiety 04/26/2023    Meningioma (HCC) 01/23/2024    Lung nodules 01/25/2024    Hepatic lesion 01/25/2024    Abnormal CT scan, lumbar spine 01/25/2024     Gallbladder polyp 01/26/2024    Primary hypertension 02/02/2024     Resolved Ambulatory Problems     Diagnosis Date Noted    Vitamin D deficiency 07/28/2016    Lipid screening 05/01/2018    Need for hepatitis C screening test 05/01/2018    Encounter for gynecological examination 05/15/2018    Encounter for gynecological examination (general) (routine) without abnormal findings 11/22/2022     Past Medical History:   Diagnosis Date    Arthritis     BRCA1 negative     Disease of thyroid gland     Endometrial cancer (HCC) 1991    Hypertension January 3 2023    Thyroid disease     Uterus cancer (HCC)        Past Surgical History:   Procedure Laterality Date    CRANIOTOMY Left 1/30/2024    Procedure: IMAGE GUIDED LEFT PTERIONAL CRANIOTOMY FOR TUMOR;  Surgeon: Placido Lowery MD;  Location: [facility];  Service: Neurosurgery    HYSTERECTOMY      still has ovaries    IR CEREBRAL ANGIOGRAPHY / INTERVENTION  1/29/2024         Current Outpatient Medications:     amLODIPine (NORVASC) 5 mg tablet, Take 1 tablet (5 mg total) by mouth daily, Disp: 30 tablet, Rfl: 0    FLUoxetine (PROzac) 20 mg capsule, Take 1 capsule (20 mg total) by mouth daily, Disp: 90 capsule, Rfl: 1    levothyroxine 75 mcg tablet, TAKE 1 TABLET (75 MCG TOTAL) BY MOUTH DAILY, Disp: 90 tablet, Rfl: 1    senna (SENOKOT) 8.6 mg, Take 1 tablet (8.6 mg total) by mouth daily for 10 days (Patient not taking: Reported on 3/5/2024), Disp: 10 tablet, Rfl: 0    vitamin A 3 MG (70166 UT) capsule, Take 2 capsules (20,000 Units total) by mouth daily for 4 days (Patient not taking: Reported on 3/5/2024), Disp: 8 capsule, Rfl: 0    zinc sulfate (ZINCATE) 220 mg capsule, Take 1 capsule (220 mg total) by mouth daily for 4 days (Patient not taking: Reported on 3/5/2024), Disp: 4 capsule, Rfl: 0    Results/Data: No new imaging

## 2024-03-12 ENCOUNTER — TELEPHONE (OUTPATIENT)
Dept: HEMATOLOGY ONCOLOGY | Facility: CLINIC | Age: 64
End: 2024-03-12

## 2024-03-12 NOTE — TELEPHONE ENCOUNTER
I spoke with Mary to schedule their consultation with Cancer Risk and Genetics.     Scheduling Outcome: Patient is scheduled for an appointment on 3/13/24 at 230 with Vviiane Zaldivar    Personal/Family History Related to Appointment:     Personal History of Cancer: Patient reports personal history of uterine    Family History of Cancer: Patient reports family history of mother-breast; brother-melanoma; sister-gallbladder    Is patient of Ashkenazi Hindu Heritage?: No    History of Genetic Testing:  Personal History of Genetic Testing: Patient report no personal history of Genetic Testing.    Family History of Genetic Testing: Patient reports that no family members have had Genetic Testing.     Patient's preferred e-mail address: [email or web address]

## 2024-03-13 ENCOUNTER — CLINICAL SUPPORT (OUTPATIENT)
Dept: GENETICS | Facility: CLINIC | Age: 64
End: 2024-03-13

## 2024-03-13 ENCOUNTER — DOCUMENTATION (OUTPATIENT)
Dept: GENETICS | Facility: CLINIC | Age: 64
End: 2024-03-13

## 2024-03-13 DIAGNOSIS — Z85.42 HISTORY OF UTERINE CANCER: Primary | ICD-10-CM

## 2024-03-13 DIAGNOSIS — Z80.0 FAMILY HISTORY OF COLON CANCER: ICD-10-CM

## 2024-03-13 DIAGNOSIS — Z80.3 FAMILY HISTORY OF BREAST CANCER: ICD-10-CM

## 2024-03-13 DIAGNOSIS — R91.8 LUNG NODULES: ICD-10-CM

## 2024-03-13 DIAGNOSIS — D32.9 MENINGIOMA (HCC): ICD-10-CM

## 2024-03-13 DIAGNOSIS — Z86.010 PERSONAL HISTORY OF COLONIC POLYPS: ICD-10-CM

## 2024-03-13 DIAGNOSIS — M89.9 LESION OF LUMBAR SPINE: ICD-10-CM

## 2024-03-13 NOTE — PROGRESS NOTES
Pre-Test Genetic Counseling Consult Note    Patient Name: Mary Ashby   /Age: 1960/63 y.o.  Referring Provider: Donya De Los Santos MD     Date of Service: 3/13/2024  Genetic Counselor: Viviane Zaldivar MS, [facility] [place]  Interpretation Services: None  Location: Telephone consult   Length of Visit: 60 Minutes    Mary was referred to the [facility] Cancer Risk and Genetic Assessment Program due to her personal history of uterine cancer, meningioma of the brain, spinal hemangiomas, lung and hepatic cysts, and colon and gallbladder polyps.  She also had a significant family history of cancer including cholangiocarcinoma, metastatic melanoma, early-onset breast cancer, early onset colon cancer among others.  She presents today to discuss the possibility of a hereditary cancer syndrome, options for genetic testing, and implications for her and her family.     Cancer History and Treatment:     - Uterine cancer (pathology and records not available)     24 CT chest abdomen pelvis w contrast  IMPRESSION:  1.  No evidence of a primary malignancy in the chest, abdomen, or pelvis.  2.  Multiple bilateral pulmonary nodules measuring 4 mm or less are indeterminate in the absence of comparison imaging. Given ongoing malignancy work-up, recommend follow-up chest CT in 3 months to exclude metastatic disease.  3.  Multiple hypoattenuating hepatic lesions similarly are indeterminate in the absence of comparison imaging. The largest of these, approximately 1 cm, measure slightly above fluid density. In light of ongoing malignancy work-up, recommend further   characterization with contrast-enhanced abdominal MRI.  4.  Multiple ill-defined foci of enhancement in the lumbar paraspinal musculature, at least 1 of these measuring 1.6 cm does appear somewhat rounded and masslike. Metastatic disease cannot be excluded. The more superior of these lesions should be   included in the field-of-view on the recommended abdominal MRI which may aid  "in further characterization. PET/CT may also be considered for further assessment.    1/30/24 Pathology following craniotomy   Final Diagnosis   A. Brain, \"Brain, left sphenoid wing brain mass\", craniotomy:  - Meningioma, CNS WHO Grade 1     2/28/24 Ultrasound of right upper quadrant   IMPRESSION: Multiple gallbladder polyps measuring up to 6 mm. According to current consensus recommendations (SRU 2022; 000:1-12), for polyps of this size ( <=  6 mm) which have a low risk morphology, no follow-up is recommended.    1/25/24 MRI lumbar spine w wo contrast   IMPRESSION:  - Paraspinal intramuscular masses. Imaging morphology most compatible with hemangiomas. Myxoma, sarcoma and metastases are considered less likely.  - No evidence of bone or intracanalicular neoplasm.  - Minimal degenerative disc disease. L4-L5 facet osteoarthritis with minimal degenerative anterolisthesis. No stenosis.    1/25/24 MRI of the abdomen w wo contrast   IMPRESSION:  - No findings suspicious for malignancy in the abdomen.  - Tiny hepatic cysts, considered benign findings. Evidence of hepatic iron deposition. Correlate for prior blood transfusions and hemochromatosis.  - Findings most compatible with gallbladder cholesterolosis/tiny polyps, typically benign. Recommend confirmation with right upper quadrant ultrasound.    1/23/24 MRI of brain   IMPRESSION:  - 4.3 cm probable meningioma in anterior aspect of left middle cranial fossa with small dural feeder vessels, diffuse surrounding vasogenic edema (left frontal, left subinsular, left posterior limb of internal capsule, left parietal, and left temporal   lobes), compressive mass effect on adjacent left temporal lobe and left lateral ventricle, 0.7 cm rightward midline shift, left uncal herniation, and rightward shift of upper brainstem. Recommend neurosurgical consultation for further evaluation.  - NeuroQuant analysis was performed:   Normal study however this is confounded by a left middle " "cranial fossa mass and the results of this study are likely inconclusive but overall does not support neurodegeneration.    Screening Hx:   Breast:  Breast Imaging: Annual mammogram   Breast Biopsy: No prior breast bx  Breast Density: Heterogeneously dense    Colon:  Colonoscopy: Most recent colonoscopy was during the summer of 2023; Mary reports having a  history of 12+ colon polyps (pathology not known), it was recommended to repeat the colonoscopy in 2 years.  She also had many colon polyps on her prior colonoscopy in 2020     Gynecologic:  Ovaries intact KATIE at 31 following a diagnosis of uterine cancer (pathology and records not available)     Skin:  No current screening    Reproductive History: Not assessed     Medical and Surgical History  Pertinent surgical history:   Past Surgical History:   Procedure Laterality Date    CRANIOTOMY Left 1/30/2024    Procedure: IMAGE GUIDED LEFT PTERIONAL CRANIOTOMY FOR TUMOR;  Surgeon: Placido Lowery MD;  Location: [facility];  Service: Neurosurgery    HYSTERECTOMY      still has ovaries    IR CEREBRAL ANGIOGRAPHY / INTERVENTION  1/29/2024      Pertinent medical history:  Past Medical History:   Diagnosis Date    Arthritis     BRCA1 negative     Disease of thyroid gland     Endometrial cancer (HCC) 1991    Hypertension January 3 2023    Thyroid disease     Uterus cancer (HCC)        Other History:  Height:   Ht Readings from Last 1 Encounters:   03/11/24 5' 3\" (1.6 m)     Weight:   Wt Readings from Last 1 Encounters:   03/11/24 59.9 kg (132 lb)     Relevant Family History   Patient reports no Ashkenazi Ep[employer] ancestry.           Please refer to the scanned pedigree in the Media Tab for a complete family history     *All history is reported as provided by the patient; records are not available for review, except where indicated.     Assessment:  We discussed sporadic, familial and hereditary cancer.  We also discussed the many factors that influence our risk for cancer such " as age, environmental exposures, lifestyle choices and family history.      We reviewed the indications suggestive of a hereditary predisposition to cancer.  We discussed that Mary has many things going on in her personal and family history that do not necessary fit into one specific pattern.  There is early onset breast cancer which is concerning for hereditary breast cancer syndrome, there is early onset uterine, colon and stomach which is concerning for Mar syndrome.  Mary has a history of 12+ colon polyps, if these are adenomatous colon polyps there is concern for polyposis.  Since Mary's personal and family histories do not fit the typical presentation of any one specific syndrome, we recommended testing with a larger multi-gene cancer panel.      Genetic testing is indicated for Mary based on the following criteria:     Meets NCCN V2.2024 Testing Criteria for High-Penetrance Breast Cancer Susceptibility Genes: Family history of a mother (FDR) with breast cancer at age 45, along with a maternal aunt (SDR) with breast cancer in her 70's    Meets NCCN V1.2023 Testing Criteria for the Evaluation of Mar syndrome: Personal history of uterine cancer under the age of 50.  Additionally, she has a paternal uncle with colon cancer in his 40's and a maternal cousin with uterine, stomach and colon cancer.      The risks, benefits, and limitations of genetic testing were reviewed with the patient, as well as genetic discrimination laws, and possible test results (positive, negative, variants of uncertain significance) and their clinical implications. If positive for a mutation, options for managing cancer risk including increased surveillance, chemoprevention, and in some cases prophylactic surgery were discussed. Mary was informed that if a hereditary cancer syndrome was identified in her, first degree relatives (parents, siblings, and children) have a chance of also inheriting the condition. Genetic testing would  allow for predictive genetic testing in other relatives, who may also be at risk depending on their degree of relation.     Billing:  Most individuals pay <$100 for hereditary cancer genetic testing. If insurance covers the cost of the testing, individuals may still pay out of pocket secondary to co-pays, co-insurance, or deductibles. If the cost of the testing exceeds $100, the lab will reach out to the patient via phone or e-mail. The patient will then have the option to proceed with the testing, cancel the testing, or elect the self-pay option of $250. Mary verbalized understanding.     Plan: Patient decided to proceed with testing and provided consent.    Summary:     Sample Collection:  A blood kit was mailed home to the patient    Genetic Testing Preformed: CancerNext-Expanded + RNA (71 genes): AIP, ALK, APC, FIDEL, AXIN2, BAP1, BARD1, BMPR1A, BRCA1, BRCA2, BRIP1, CDC73, CDH1, CDK4, CDKN1B, CDKN2A, CHEK2, CTNNA1, DICER1, EGFR, EGLN1, EPCAM, FH, FLCN, GREM1, HOXB13, KIF1B, KIT, LZTR1, MAX, MEN1, MET, MITF, MLH1, MSH2, MSH3, MSH6, MUTYH, NF1, NF2, NTHL1, PALB2, PDGFRA, PHOX2B, PMS2, POLD1, POLE, POT1, RFMMR7H, PTCH1, PTEN, RAD51C, RAD51D, RB1, RET, SDHA, SDHAF2, SDHB, SDHC, SDHD, SMAD4, SMARCA4, SMARCB1, SMARCE1, STK11, SUFU, BKQN802, TP53, TSC1, TSC2, VHL     Result Call Information:  In the event that we need to reach Mary via telephone:  I confirmed the patient's mobile number on file as the best number to call with results  I confirmed with the patient that we can leave a voicemail on the provided numbers    Results take approximately 2-3 weeks to complete once test is started.    Mary will be notified via [employer] once results are available.      Additional recommendations for surveillance/medical management will be made pending genetic test results.

## 2024-03-18 DIAGNOSIS — I10 PRIMARY HYPERTENSION: ICD-10-CM

## 2024-03-18 RX ORDER — AMLODIPINE BESYLATE 5 MG/1
5 TABLET ORAL DAILY
Qty: 30 TABLET | Refills: 0 | Status: SHIPPED | OUTPATIENT
Start: 2024-03-18

## 2024-04-01 ENCOUNTER — APPOINTMENT (OUTPATIENT)
Dept: LAB | Facility: CLINIC | Age: 64
End: 2024-04-01
Payer: COMMERCIAL

## 2024-04-01 DIAGNOSIS — M89.9 BONE DISEASE: ICD-10-CM

## 2024-04-01 DIAGNOSIS — D72.819 LEUKOPENIA, UNSPECIFIED TYPE: ICD-10-CM

## 2024-04-01 DIAGNOSIS — Z86.010 PERSONAL HISTORY OF COLONIC POLYPS: ICD-10-CM

## 2024-04-01 DIAGNOSIS — Z80.0 FH: GI CANCER: ICD-10-CM

## 2024-04-01 DIAGNOSIS — R91.8 LUNG MASS: ICD-10-CM

## 2024-04-01 DIAGNOSIS — Z85.42 HISTORY OF UTERINE CANCER: ICD-10-CM

## 2024-04-01 DIAGNOSIS — Z80.3 FAMILY HISTORY OF BREAST CANCER: ICD-10-CM

## 2024-04-01 DIAGNOSIS — D32.9 BENIGN NEOPLASM OF MENINGES (HCC): ICD-10-CM

## 2024-04-01 PROCEDURE — 36415 COLL VENOUS BLD VENIPUNCTURE: CPT

## 2024-04-09 DIAGNOSIS — I10 PRIMARY HYPERTENSION: ICD-10-CM

## 2024-04-09 RX ORDER — AMLODIPINE BESYLATE 5 MG/1
5 TABLET ORAL DAILY
Qty: 30 TABLET | Refills: 0 | Status: SHIPPED | OUTPATIENT
Start: 2024-04-09

## 2024-04-24 ENCOUNTER — APPOINTMENT (OUTPATIENT)
Dept: LAB | Facility: CLINIC | Age: 64
End: 2024-04-24
Payer: COMMERCIAL

## 2024-04-24 DIAGNOSIS — E83.19 IRON OVERLOAD: ICD-10-CM

## 2024-04-24 LAB
ALBUMIN SERPL BCP-MCNC: 4.4 G/DL (ref 3.5–5)
ALP SERPL-CCNC: 69 U/L (ref 34–104)
ALT SERPL W P-5'-P-CCNC: 19 U/L (ref 7–52)
ANION GAP SERPL CALCULATED.3IONS-SCNC: 7 MMOL/L (ref 4–13)
AST SERPL W P-5'-P-CCNC: 20 U/L (ref 13–39)
BASOPHILS # BLD AUTO: 0.01 THOUSANDS/ÂΜL (ref 0–0.1)
BASOPHILS NFR BLD AUTO: 0 % (ref 0–1)
BILIRUB SERPL-MCNC: 0.84 MG/DL (ref 0.2–1)
BUN SERPL-MCNC: 12 MG/DL (ref 5–25)
CALCIUM SERPL-MCNC: 9.2 MG/DL (ref 8.4–10.2)
CHLORIDE SERPL-SCNC: 102 MMOL/L (ref 96–108)
CO2 SERPL-SCNC: 29 MMOL/L (ref 21–32)
CREAT SERPL-MCNC: 0.61 MG/DL (ref 0.6–1.3)
EOSINOPHIL # BLD AUTO: 0.03 THOUSAND/ÂΜL (ref 0–0.61)
EOSINOPHIL NFR BLD AUTO: 1 % (ref 0–6)
ERYTHROCYTE [DISTWIDTH] IN BLOOD BY AUTOMATED COUNT: 12 % (ref 11.6–15.1)
FERRITIN SERPL-MCNC: 621 NG/ML (ref 11–307)
GFR SERPL CREATININE-BSD FRML MDRD: 98 ML/MIN/1.73SQ M
GLUCOSE P FAST SERPL-MCNC: 80 MG/DL (ref 65–99)
HCT VFR BLD AUTO: 42.6 % (ref 34.8–46.1)
HGB BLD-MCNC: 14.1 G/DL (ref 11.5–15.4)
IMM GRANULOCYTES # BLD AUTO: 0.01 THOUSAND/UL (ref 0–0.2)
IMM GRANULOCYTES NFR BLD AUTO: 0 % (ref 0–2)
IRON SATN MFR SERPL: 72 % (ref 15–50)
IRON SERPL-MCNC: 172 UG/DL (ref 50–212)
LYMPHOCYTES # BLD AUTO: 1.31 THOUSANDS/ÂΜL (ref 0.6–4.47)
LYMPHOCYTES NFR BLD AUTO: 34 % (ref 14–44)
MCH RBC QN AUTO: 30.6 PG (ref 26.8–34.3)
MCHC RBC AUTO-ENTMCNC: 33.1 G/DL (ref 31.4–37.4)
MCV RBC AUTO: 92 FL (ref 82–98)
MONOCYTES # BLD AUTO: 0.44 THOUSAND/ÂΜL (ref 0.17–1.22)
MONOCYTES NFR BLD AUTO: 11 % (ref 4–12)
NEUTROPHILS # BLD AUTO: 2.09 THOUSANDS/ÂΜL (ref 1.85–7.62)
NEUTS SEG NFR BLD AUTO: 54 % (ref 43–75)
NRBC BLD AUTO-RTO: 0 /100 WBCS
PLATELET # BLD AUTO: 165 THOUSANDS/UL (ref 149–390)
PMV BLD AUTO: 10.2 FL (ref 8.9–12.7)
POTASSIUM SERPL-SCNC: 4 MMOL/L (ref 3.5–5.3)
PROT SERPL-MCNC: 7 G/DL (ref 6.4–8.4)
RBC # BLD AUTO: 4.61 MILLION/UL (ref 3.81–5.12)
SODIUM SERPL-SCNC: 138 MMOL/L (ref 135–147)
TIBC SERPL-MCNC: 239 UG/DL (ref 250–450)
UIBC SERPL-MCNC: 67 UG/DL (ref 155–355)
WBC # BLD AUTO: 3.89 THOUSAND/UL (ref 4.31–10.16)

## 2024-04-24 PROCEDURE — 85025 COMPLETE CBC W/AUTO DIFF WBC: CPT

## 2024-04-24 PROCEDURE — 80053 COMPREHEN METABOLIC PANEL: CPT

## 2024-04-24 PROCEDURE — 83550 IRON BINDING TEST: CPT

## 2024-04-24 PROCEDURE — 82728 ASSAY OF FERRITIN: CPT

## 2024-04-24 PROCEDURE — 83540 ASSAY OF IRON: CPT

## 2024-04-26 ENCOUNTER — TELEPHONE (OUTPATIENT)
Dept: GENETICS | Facility: CLINIC | Age: 64
End: 2024-04-26

## 2024-04-26 NOTE — TELEPHONE ENCOUNTER
Post-Test Genetic Counseling Consult Note  Today I spoke with Mary over the phone to review the results of her genetic test for hereditary cancer. We met previously on 3/13/24 for pre-test counseling.     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 NTHL1-associated polyposis is also known as NTHL1 tumor syndrome or familial adenomatous polyposis 3 (OMIM: 796600).    The prevalence of NTHL1-associated polyposis is unknown. Based on the prevalence of NTHL1 pathogenic variants in the population, it has been estimated that in Europeans, NTHL1-associated polyposis would occur with a frequency of approximately 1 in 114,770.  Since NTHL1-associated polyposis is rare, the full clinical spectrum of tumors and/or cancer, the lifetime cancer risks and the recommended surveillance is still emerging.         NTHL1-associated polyposis    NTHL1-associated polyposis is characterized by a hereditary predisposition for colon polyps including adenomatous, hyperplastic, and/or sessile serrated polyps.  The number of polyps ranges from 1-100.  Duodenal (small bowel) polyposis has also been reported.        There is also an increased lifetime risk for colorectal cancer (estimated to be >20%), and breast cancer.  The exact lifetime risk for  breast cancer is not known.      Additional cancers are reported in individuals with NTHL1-associated polyposis, however the data regarding lifetime risks and screening recommendations is still emerging.  Other cancer reported include endometrial cancer, cervical cancer, urothelial carcinoma of the bladder, meningiomas, unspecified brain tumors, basal cell carcinomas, head and neck squamous cell carcinomas, and hematologic malignancies. The cumulative lifetime risk of developing extracolonic (cancer outside the colon) cancer by age 60 years has been estimated at 6% to 56%.     Risk for Family Members:  To date, there is no evidence that individuals with 1 NTHL1 variant are at increased risk for cancer and there are no specific screening recommendations for relatives with 1 NTHL1 variant.   These relatives are advised to participate in population screening measures for colorectal cancer and breast cancer, or could be offered screening based on their family history (e.g., incidence of CRC and/or breast cancer in family members who do not have two NTHL1 pathogenic variants).  Individuals who carry 1 NTHL1 variant may be at risk to have a child with NTHL1-associated polyposis if their partner also carries 1 NTHL1 variant.      Children: Individuals with NTHL1-associated polyposis will always pass 1 NTHL1 variant to their children.  This means that all of Emilios children carry at least 1 NTHL1 variant.  It is recommend that Em[facility]os adult children undergo full NTHL1 gene sequencing and deletion/duplication analysis to determine their NTHL1 status, and their cancer and reproductive risks.    Risks for parents: The parents of an individual diagnosed with NTHL1-associated polyposis are obligate carriers meaning they each carry 1 NTHL1 variant.  Emilios parents can consider NTHL1 gene sequencing and deletion/duplication analysis to determine their NTHL1 status and cancer risks.      Risks for siblings: Siblings of an individual  diagnosed with NTHL1-associated polyposis have a 25% chance of also having NTHL1-associated polyposis, they have a 50% chance of carrying 1 NTHL1 variant, and a 25% chance of not having any NTHL1 variants.  It is recommend that Mary's adult siblings undergo full NTHL1 gene sequencing and deletion/duplication analysis to determine their NTHL1 status, their cancer risks and risks for Mary nieces and nephews.  If Mary's siblings are not available for testing, her nieces and nephews can consider testing.      Other relatives such as aunts, uncles and cousins may also be at risk.  Since it is not known with one-hundred percent certainty which side of the family this NTHL1 pathogenic variant came from, we recommend that Mary share this test results with extended family members from both sides of the family.       We encouraged Mary  to discuss this information with her relatives.  If Mary family members have any questions or are interested in testing they can reach out to the main Genetics number at [phone] for additional information.     Additional Information:  A healthy lifestyle will improve overall health and reduce risk for illness. Eating a healthy diet and exercising for 4 hours per week is recommended. Both diet and exercise have been shown to help maintain a healthy weight. Postmenopausal women who are overweight are at higher risk for breast cancer. Moderate to heavy alcohol use can increase the risk for some cancers. Smoking cigarettes can also increase risk for breast, lung, prostate, pancreatic and other cancers.      Management:  Management guidelines for individuals with NTHL1-associated polyposis have been developed by the National Comprehensive Cancer Network.  Please refer to the current NCCN guidelines for the most up to date recommendations (https://www.nccn.org/guidelines/category_2).  The recommendations listed below are specific to Mary and are are based on recommendations in the the NCCN  guidelines as of 04/26/24. These recommendations are subject to change over time and the newest guidelines should be referenced for the most up to date screening and management recommendations.      Plan:      Colon Cancer Screening:  - Begin high-quality colonoscopy at age 25-30 and repeat every 2-3 years if negative. If polyps are found, high quality colonoscopy every 1-2 years with consideration of surgery if the polyp burden becomes unmanageable.      Breast Cancer Screening:  - The current NCCN guidelines state that there is not enough data yet to support increased breast cancer surveillance. However some experts recommend annual breast MRI beginning at age 30 along with annual mammography beginning at age 40.     Reference: Pola RP, Michelle M, De Chelsea RM, et al. NTHL1 Tumor Syndrome. 2020 Apr 2. In: Haim MP, Everette J, Wendy HUERTA, et al., editors. [employer]® [Internet]. [place] ([place]): [facility]; 4074-0034. Available from: https://www.ncbi.nlm.nih.gov/books/OQZ167830/     Duodenal Cancer Screening:   - Baseline upper endoscopy (including complete visualization of the ampulla of Vater) beginning at age 30-35.  Repeat endoscopy at least every 5 years unless otherwise clinically indicated [see FAP-C for follow-up of duodenoscopic findings].      Endometrial Cancer Screening:   Mary already had a KATIE at age 31 following a diagnosis of uterine cancer .    There are no additional recommendations based on Mary's result. she should continue cancer screening and medical management as clinically indicated and as determined appropriate by her healthcare providers.    Positive Result: Mary was strongly encouraged to follow up on with our office on an annual basis to review the most up to date guidelines as recommendations are subject to change over time.

## 2024-05-01 ENCOUNTER — OFFICE VISIT (OUTPATIENT)
Dept: FAMILY MEDICINE CLINIC | Facility: CLINIC | Age: 64
End: 2024-05-01
Payer: COMMERCIAL

## 2024-05-01 VITALS
TEMPERATURE: 97.9 F | WEIGHT: 135 LBS | HEIGHT: 63 IN | OXYGEN SATURATION: 99 % | SYSTOLIC BLOOD PRESSURE: 130 MMHG | DIASTOLIC BLOOD PRESSURE: 82 MMHG | HEART RATE: 67 BPM | BODY MASS INDEX: 23.92 KG/M2

## 2024-05-01 DIAGNOSIS — Z15.89 GENE MUTATION: ICD-10-CM

## 2024-05-01 DIAGNOSIS — E83.10 IRON STORAGE DISORDER: ICD-10-CM

## 2024-05-01 DIAGNOSIS — I10 PRIMARY HYPERTENSION: ICD-10-CM

## 2024-05-01 DIAGNOSIS — R91.8 LUNG NODULES: ICD-10-CM

## 2024-05-01 DIAGNOSIS — D32.9 MENINGIOMA (HCC): Primary | ICD-10-CM

## 2024-05-01 DIAGNOSIS — K63.5 MULTIPLE POLYPS OF SIGMOID COLON: ICD-10-CM

## 2024-05-01 PROCEDURE — 99214 OFFICE O/P EST MOD 30 MIN: CPT | Performed by: PHYSICIAN ASSISTANT

## 2024-05-01 RX ORDER — AMLODIPINE BESYLATE 5 MG/1
5 TABLET ORAL DAILY
Qty: 30 TABLET | Refills: 0 | Status: SHIPPED | OUTPATIENT
Start: 2024-05-01

## 2024-05-01 NOTE — PROGRESS NOTES
Name: Mary Ashby      : 1960      MRN: [id number]  Encounter Provider: Sinan Palacios PA-C  Encounter Date: 2024   Encounter department: [facility]    Assessment & Plan     1. Meningioma (HCC)    2. Primary hypertension  -     amLODIPine (NORVASC) 5 mg tablet; Take 1 tablet (5 mg total) by mouth daily    3. Lung nodules    4. Iron storage disorder    5. Multiple polyps of sigmoid colon  -     Ambulatory Referral to Gastroenterology; Future    6. Gene mutation  -     MRI breast bilateral w and wo contrast w cad; Future; Expected date: 2024    Continue with NS, heme-onc. Planned MRI of the brain, as well as lumbar spine. Chest CT to be repeated. Iron abnormality, continue with heme for possible hemachromatosis. LFTs normal. Blood counts unremarkable. Noted improvement in [employer] stores. Continue with genetics and add breast MRI imaging to routine screening. Follow with GI for EGD/colonoscopy. 3 month follow up, earlier prn       Subjective     Pt presents for follow up    Meningioma: s/p resection with NS on 24. Doing well. No HA or neuro complaints. Her speech seems better. Recent moca testing , improved from  pre-resection  Has planned upcoming MRI brain, as well as MRI lumbar spine to follow likely hemangiomas. She also follows with CTs for lung nodules. She is following with heme/onc as well, recent iron studies showing normal serum iron, increased ferritin and iron saturation with low TIBC. There was iron deposition noted in the liver with normal LFTs as of 2024. Blood counts unremarkable.     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 She was told this predisposes her to certain gynecologic, hematologic, head and neck cancers as well as breast cancer and colon polyps. Pt does have a hx of uterine cancer s/p hysterectomy at 32 y/o and had recent colonoscopy with many polyps. She is  due for GI follow up.     Overall she is feeling well today       Review of Systems   Constitutional:  Negative for chills, fatigue and fever.   HENT:  Negative for congestion, ear pain, hearing loss, nosebleeds, postnasal drip, rhinorrhea, sinus pressure, sinus pain, sneezing and sore throat.    Eyes:  Negative for pain, discharge, itching and visual disturbance.   Respiratory:  Negative for cough, chest tightness, shortness of breath and wheezing.    Cardiovascular:  Negative for chest pain, palpitations and leg swelling.   Gastrointestinal:  Negative for abdominal pain, blood in stool, constipation, diarrhea, nausea and vomiting.   Neurological:  Negative for dizziness, facial asymmetry, speech difficulty, light-headedness, numbness and headaches.       Past Medical History:   Diagnosis Date   • Arthritis    • BRCA1 negative    • Disease of thyroid gland    • Endometrial cancer (HCC) 1991   • Hypertension January 3 2023   • Thyroid disease    • Uterus cancer (HCC)      Past Surgical History:   Procedure Laterality Date   • CRANIOTOMY Left 1/30/2024    Procedure: IMAGE GUIDED LEFT PTERIONAL CRANIOTOMY FOR TUMOR;  Surgeon: Placido Lowery MD;  Location: [facility];  Service: Neurosurgery   • HYSTERECTOMY      still has ovaries   • IR CEREBRAL ANGIOGRAPHY / INTERVENTION  1/29/2024     Family History   Problem Relation Age of Onset   • Breast cancer Mother 50   • No Known Problems Father    • Cancer Sister    • No Known Problems Daughter    • No Known Problems Maternal Grandmother    • No Known Problems Maternal Grandfather    • No Known Problems Paternal Grandmother    • No Known Problems Paternal Grandfather    • Lymphoma Brother    • Melanoma Brother 50   • No Known Problems Son    • Skin cancer Maternal Aunt    • Breast cancer Maternal Aunt    • Colon cancer Paternal Uncle    • Breast cancer Cousin      Social History     Socioeconomic History   • Marital status: /Civil Union     Spouse name: None   •  Number of children: None   • Years of education: None   • Highest education level: None   Occupational History   • None   Tobacco Use   • Smoking status: Former     Current packs/day: 0.00     Types: Cigarettes     Quit date: 1984     Years since quittin.3   • Smokeless tobacco: Never   Vaping Use   • Vaping status: Never Used   Substance and Sexual Activity   • Alcohol use: Yes     Alcohol/week: 3.0 standard drinks of alcohol     Types: 3 Glasses of wine per week     Comment: social   • Drug use: No   • Sexual activity: Not Currently     Partners: Male     Birth control/protection: Surgical     Comment: Hysterectomy 1991   Other Topics Concern   • None   Social History Narrative   • None     Social Determinants of Health     Financial Resource Strain: Not on file   Food Insecurity: No Food Insecurity (2024)    Hunger Vital Sign    • Worried About Running Out of Food in the Last Year: Never true    • Ran Out of Food in the Last Year: Never true   Transportation Needs: No Transportation Needs (2024)    PRAPARE - Transportation    • Lack of Transportation (Medical): No    • Lack of Transportation (Non-Medical): No   Physical Activity: Not on file   Stress: Not on file   Social Connections: Not on file   Intimate Partner Violence: Not on file   Housing Stability: Low Risk  (2024)    Housing Stability Vital Sign    • Unable to Pay for Housing in the Last Year: No    • Number of Places Lived in the Last Year: 1    • Unstable Housing in the Last Year: No     Current Outpatient Medications on File Prior to Visit   Medication Sig   • FLUoxetine (PROzac) 20 mg capsule Take 1 capsule (20 mg total) by mouth daily   • levothyroxine 75 mcg tablet TAKE 1 TABLET (75 MCG TOTAL) BY MOUTH DAILY   • [DISCONTINUED] amLODIPine (NORVASC) 5 mg tablet take 1 tablet by mouth once daily   • [DISCONTINUED] senna (SENOKOT) 8.6 mg Take 1 tablet (8.6 mg total) by mouth daily for 10 days (Patient not taking: Reported on  "3/5/2024)   • [DISCONTINUED] vitamin A 3 MG (94529 UT) capsule Take 2 capsules (20,000 Units total) by mouth daily for 4 days (Patient not taking: Reported on 3/5/2024)   • [DISCONTINUED] zinc sulfate (ZINCATE) 220 mg capsule Take 1 capsule (220 mg total) by mouth daily for 4 days (Patient not taking: Reported on 3/5/2024)     No Known Allergies  Immunization History   Administered Date(s) Administered   • COVID-19 PFIZER VACCINE 0.3 ML IM 05/19/2021, 06/09/2021, 01/11/2022   • COVID-19 Pfizer Vac BIVALENT Augusto-sucrose 12 Yr+ IM 10/18/2022       Objective     /82 (BP Location: Left arm, Patient Position: Sitting, Cuff Size: Adult)   Pulse 67   Temp 97.9 °F (36.6 °C)   Ht 5' 3\" (1.6 m)   Wt 61.2 kg (135 lb)   LMP  (LMP Unknown)   SpO2 99%   BMI 23.91 kg/m²     Physical Exam  Vitals and nursing note reviewed.   Constitutional:       Appearance: Normal appearance.   HENT:      Head: Normocephalic and atraumatic.   Pulmonary:      Effort: Pulmonary effort is normal. No respiratory distress.   Musculoskeletal:      Cervical back: Normal range of motion.   Skin:     General: Skin is warm and dry.   Neurological:      Mental Status: She is alert and oriented to person, place, and time.   Psychiatric:         Mood and Affect: Mood normal.       Sinan Palacios PA-C    "

## 2024-05-02 ENCOUNTER — HOSPITAL ENCOUNTER (OUTPATIENT)
Dept: MRI IMAGING | Facility: HOSPITAL | Age: 64
Discharge: HOME | End: 2024-05-02
Attending: NEUROLOGICAL SURGERY
Payer: COMMERCIAL

## 2024-05-02 ENCOUNTER — TELEPHONE (OUTPATIENT)
Dept: HEMATOLOGY ONCOLOGY | Facility: CLINIC | Age: 64
End: 2024-05-02

## 2024-05-02 DIAGNOSIS — D32.9 MENINGIOMA (HCC): ICD-10-CM

## 2024-05-02 PROCEDURE — A9585 GADOBUTROL INJECTION: HCPCS | Performed by: NEUROLOGICAL SURGERY

## 2024-05-02 PROCEDURE — 70553 MRI BRAIN STEM W/O & W/DYE: CPT

## 2024-05-02 RX ORDER — GADOBUTROL 604.72 MG/ML
6 INJECTION INTRAVENOUS
Status: COMPLETED | OUTPATIENT
Start: 2024-05-02 | End: 2024-05-02

## 2024-05-02 RX ADMIN — GADOBUTROL 6 ML: 604.72 INJECTION INTRAVENOUS at 10:38

## 2024-05-02 NOTE — TELEPHONE ENCOUNTER
Mora calling to verify patient's genetic test results.  Mora wanted to verify which gene patient tested positive for.  Patient tested positive for the NTHL1 gene.  Mora verbalized her understanding.

## 2024-05-09 ENCOUNTER — OFFICE VISIT (OUTPATIENT)
Dept: NEUROSURGERY | Facility: CLINIC | Age: 64
End: 2024-05-09
Payer: COMMERCIAL

## 2024-05-09 ENCOUNTER — TELEPHONE (OUTPATIENT)
Dept: NEUROSURGERY | Facility: CLINIC | Age: 64
End: 2024-05-09

## 2024-05-09 VITALS
BODY MASS INDEX: 23.92 KG/M2 | TEMPERATURE: 98.7 F | WEIGHT: 135 LBS | OXYGEN SATURATION: 99 % | DIASTOLIC BLOOD PRESSURE: 68 MMHG | HEART RATE: 66 BPM | SYSTOLIC BLOOD PRESSURE: 112 MMHG | HEIGHT: 63 IN | RESPIRATION RATE: 18 BRPM

## 2024-05-09 DIAGNOSIS — D32.9 MENINGIOMA (HCC): Primary | ICD-10-CM

## 2024-05-09 DIAGNOSIS — Z01.818 PRE-PROCEDURAL EXAMINATION: Primary | ICD-10-CM

## 2024-05-09 PROCEDURE — 99214 OFFICE O/P EST MOD 30 MIN: CPT | Performed by: NEUROLOGICAL SURGERY

## 2024-05-09 NOTE — PROGRESS NOTES
Patient Id: Mary Ashby is a 63 y.o. female        Handedness: Right      Assessment/Plan:    Diagnoses and all orders for this visit:    Meningioma (HCC)  -     MRI brain bt w wo contrast; Future    Other orders  -     aspirin (ECOTRIN LOW STRENGTH) 81 mg EC tablet; Take 81 mg by mouth daily        Discussion and summary:    WHO grade 1 sphenoid wing meningioma status post resection 1/30/2024.  She is now 3-month status post resection of her WHO grade 1 meningioma of her left sphenoid wing.  She has been doing relatively well.  She still continues to complain of head pressure and frequent headaches.  She also has some mild jaw pain which she notices when she eats soft or chewy foods.  Overall these complaints have slowly improved.  Her energy and speech problems have resolved.    Her MRI does not demonstrate any evidence of recurrence or residual tumor.  There is some medial dural thickening which we will continue to monitor over the course of time.    We once again discussed natural history and diagnosis of WHO grade 1 meningiomas.  I will see her back in 3 months for 6-month postsurgical MRI and then subsequently at 12 months and annually thereafter in accordance with NCCN guidelines.    2.  Memory difficulties.  Tumor was initially discovered on MRI for memory difficulties and speech difficulties.  Since her resection her speech difficulties have significantly improved, however, she continues to have some slight memory difficulties which both her and her children have noticed.  We did discuss the possibility of neuropsych evaluation as she becomes further out from her surgical resection.  At this time she would like to defer this for continued observation.    3.  NTHL1 associated polyposis  Autosomal trait which puts her at higher risk for colon cancer and is also associated with intracranial meningiomas.  We will continue to monitor for intracranial disease moving forward.    I have spent a total time of 30  minutes on 05/09/24 in caring for this patient including Diagnostic results, Prognosis, Instructions for management, Patient and family education, Risk factor reductions, Impressions, Counseling / Coordination of care, Documenting in the medical record, Reviewing / ordering tests, medicine, procedures  , and Obtaining or reviewing history  .      Chief Complaint: Follow-up (6-8 WEEK F/U W/ 05/02/2024 MRI BRAIN)      HPI:   This is a very pleasant 63-year-old female who presented with speech difficulties and was found to have a large left sphenoid wing meningioma after obtaining an outpatient imaging study.  She ultimately underwent embolization and resection on January 30, 2024.     She is now 3-month status post surgical resection.  She has been doing well.  She still continues to have some swelling in her eyes in the morning as well as a head pressure.  Overall it is slowly improving.  She still also complains of some mild difficulty with chewing some foods.    She recently underwent genetic screening and genetic consult.  She also has some complaints of continued memory difficulties.    Her past medical history is significant for hypertension.     She is .  She is a [employer] and has outlined her previous wishes regarding blood transfusion if necessary.  She has one daughter age 36 and one son who is 38.  She is not currently employed.  She denies tobacco and illicit drug use.  She has not had any alcohol since surgery.  She denies any Neurologic family history.    Review of systems obtained by the MA reviewed and updated below.    Review of Systems   Constitutional: Negative.    HENT: Negative.  Negative for tinnitus.    Eyes:  Positive for pain (pressure behind both eyes). Negative for visual disturbance.   Respiratory: Negative.     Cardiovascular: Negative.    Gastrointestinal: Negative.    Endocrine: Negative.    Genitourinary: Negative.    Musculoskeletal: Negative.  Negative for gait  problem.   Skin: Negative.    Allergic/Immunologic: Negative.    Neurological:  Positive for speech difficulty (word finding not constant) and headaches (occa, right in the middle of forehead). Negative for dizziness, weakness, light-headedness and numbness.   Hematological:  Bruises/bleeds easily (asa81).   Psychiatric/Behavioral:  Positive for confusion (forgetful). Negative for decreased concentration and sleep disturbance.        Physical Exam  Vitals:    05/09/24 0959   BP: 112/68   Pulse: 66   Resp: 18   Temp: 98.7 °F (37.1 °C)   SpO2: 99%   She is well appearing.  Affect is appropriate. Body mass index is 23.91 kg/m².. She is awake alert and oriented.  Hearing and vision are grossly intact.   Her pupils are equal round reactive to light.  Her extraocular movements are intact.  Her face is symmetric.  Tongue is midline.  Facial sensation is intact and symmetric throughout.  Shoulder shrug is 5/5.  There is no drift or dysmetria.     She has full strength in her bilateral upper and lower extremities.  She has normal muscle tone muscle bulk.  Her biceps reflexes and patellar reflexes are 2+ and symmetric.  Marcia sign negative bilaterally.  Sensation intact to light touch and pinprick throughout.  Her gait is normal.     Her heart rate is regular.  Normal respiratory effort.  Abdomen nondistended.  Radial pulses 2+.     The following portions of the patient's history were reviewed and updated as appropriate: allergies, current medications, past family history, past medical history, past social history, past surgical history, and problem list.    Active Ambulatory Problems     Diagnosis Date Noted    Hypothyroidism 07/21/2016    Anxiety 04/26/2023    Meningioma (HCC) 01/23/2024    Lung nodules 01/25/2024    Hepatic lesion 01/25/2024    Abnormal CT scan, lumbar spine 01/25/2024    Gallbladder polyp 01/26/2024    Primary hypertension 02/02/2024     Resolved Ambulatory Problems     Diagnosis Date Noted    Vitamin  D deficiency 07/28/2016    Lipid screening 05/01/2018    Need for hepatitis C screening test 05/01/2018    Encounter for gynecological examination 05/15/2018    Encounter for gynecological examination (general) (routine) without abnormal findings 11/22/2022     Past Medical History:   Diagnosis Date    Arthritis     BRCA1 negative     Disease of thyroid gland     Endometrial cancer (HCC) 1991    Hypertension January 3 2023    Thyroid disease     Uterus cancer (HCC)        Past Surgical History:   Procedure Laterality Date    CRANIOTOMY Left 1/30/2024    Procedure: IMAGE GUIDED LEFT PTERIONAL CRANIOTOMY FOR TUMOR;  Surgeon: Placido Lowery MD;  Location: [facility];  Service: Neurosurgery    HYSTERECTOMY      still has ovaries    IR CEREBRAL ANGIOGRAPHY / INTERVENTION  1/29/2024         Current Outpatient Medications:     amLODIPine (NORVASC) 5 mg tablet, Take 1 tablet (5 mg total) by mouth daily, Disp: 30 tablet, Rfl: 0    aspirin (ECOTRIN LOW STRENGTH) 81 mg EC tablet, Take 81 mg by mouth daily, Disp: , Rfl:     FLUoxetine (PROzac) 20 mg capsule, Take 1 capsule (20 mg total) by mouth daily, Disp: 90 capsule, Rfl: 1    levothyroxine 75 mcg tablet, TAKE 1 TABLET (75 MCG TOTAL) BY MOUTH DAILY, Disp: 90 tablet, Rfl: 1    Results/Data:Imaging personally reviewed in detail with patient as well as reports. Of note, no evidence of recurrence or residual tumor.

## 2024-05-16 ENCOUNTER — HOSPITAL ENCOUNTER (OUTPATIENT)
Dept: MRI IMAGING | Facility: HOSPITAL | Age: 64
End: 2024-05-16
Attending: INTERNAL MEDICINE
Payer: COMMERCIAL

## 2024-05-16 DIAGNOSIS — M89.9 LESION OF LUMBAR SPINE: ICD-10-CM

## 2024-05-16 PROCEDURE — A9585 GADOBUTROL INJECTION: HCPCS | Performed by: INTERNAL MEDICINE

## 2024-05-16 PROCEDURE — 72158 MRI LUMBAR SPINE W/O & W/DYE: CPT

## 2024-05-16 RX ORDER — GADOBUTROL 604.72 MG/ML
6 INJECTION INTRAVENOUS
Status: COMPLETED | OUTPATIENT
Start: 2024-05-16 | End: 2024-05-16

## 2024-05-16 RX ADMIN — GADOBUTROL 6 ML: 604.72 INJECTION INTRAVENOUS at 10:52

## 2024-05-20 ENCOUNTER — HOSPITAL ENCOUNTER (OUTPATIENT)
Dept: CT IMAGING | Facility: HOSPITAL | Age: 64
Discharge: HOME | End: 2024-05-20
Payer: COMMERCIAL

## 2024-05-20 DIAGNOSIS — R91.1 LUNG NODULE: ICD-10-CM

## 2024-05-20 PROCEDURE — G1004 CDSM NDSC: HCPCS

## 2024-05-20 PROCEDURE — 71250 CT THORAX DX C-: CPT

## 2024-05-21 ENCOUNTER — TELEPHONE (OUTPATIENT)
Dept: HEMATOLOGY ONCOLOGY | Facility: CLINIC | Age: 64
End: 2024-05-21

## 2024-05-21 NOTE — TELEPHONE ENCOUNTER
Appointment Change  Cancel, Reschedule, Change to Virtual      Who are you speaking with? Patient   If it is not the patient, is the caller listed on the communication consent form? N/A   Which provider is the appointment scheduled with? Dr. De Los Santos   When was the original appointment scheduled?    Please list date and time 6/5/24 1:40 PM   At which location is the appointment scheduled to take place? [place]   Was the appointment rescheduled?     Was the appointment changed from an in person visit to a virtual visit?    If so, please list the details of the change. 7/11/24 8:20 AM   What is the reason for the appointment change? Provider requested change due to scheduling conflict.        Was STAR transport scheduled? No   Does STAR transport need to be scheduled for the new visit (if applicable) No   Does the patient need an infusion appointment rescheduled? No   Does the patient have an upcoming infusion appointment scheduled? If so, when? No   Is the patient undergoing chemotherapy? No   For appointments cancelled with less than 24 hours:  Was the no-show policy reviewed? Yes

## 2024-05-26 DIAGNOSIS — I10 PRIMARY HYPERTENSION: ICD-10-CM

## 2024-05-26 RX ORDER — AMLODIPINE BESYLATE 5 MG/1
5 TABLET ORAL DAILY
Qty: 30 TABLET | Refills: 5 | Status: SHIPPED | OUTPATIENT
Start: 2024-05-26

## 2024-05-28 DIAGNOSIS — E03.8 OTHER SPECIFIED HYPOTHYROIDISM: ICD-10-CM

## 2024-05-29 RX ORDER — LEVOTHYROXINE SODIUM 0.07 MG/1
75 TABLET ORAL DAILY
Qty: 90 TABLET | Refills: 1 | Status: SHIPPED | OUTPATIENT
Start: 2024-05-29

## 2024-05-31 DIAGNOSIS — R91.8 LUNG NODULES: Primary | ICD-10-CM

## 2024-06-05 ENCOUNTER — TELEPHONE (OUTPATIENT)
Dept: OTHER | Facility: HOSPITAL | Age: 64
End: 2024-06-05

## 2024-06-05 DIAGNOSIS — I10 PRIMARY HYPERTENSION: ICD-10-CM

## 2024-06-05 NOTE — TELEPHONE ENCOUNTER
Patient called requesting refill for amLODIPine (NORVASC) 5 mg tablet Patient made aware medication was refilled on 05/26/2024 for 30 with 5 refills to [id number] - [place] - [address] [place] [address] pharmacy. Patient instructed to contact the pharmacy to obtain refills of medication. Patient verbalized understanding.

## 2024-06-28 DIAGNOSIS — F41.9 ANXIETY: ICD-10-CM

## 2024-06-28 RX ORDER — FLUOXETINE HYDROCHLORIDE 20 MG/1
20 CAPSULE ORAL DAILY
Qty: 90 CAPSULE | Refills: 1 | Status: SHIPPED | OUTPATIENT
Start: 2024-06-28 | End: 2024-12-25

## 2024-07-11 ENCOUNTER — TELEPHONE (OUTPATIENT)
Dept: HEMATOLOGY ONCOLOGY | Facility: CLINIC | Age: 64
End: 2024-07-11

## 2024-07-11 ENCOUNTER — OFFICE VISIT (OUTPATIENT)
Dept: HEMATOLOGY ONCOLOGY | Facility: CLINIC | Age: 64
End: 2024-07-11
Payer: COMMERCIAL

## 2024-07-11 VITALS
OXYGEN SATURATION: 97 % | RESPIRATION RATE: 18 BRPM | HEART RATE: 49 BPM | TEMPERATURE: 97.8 F | HEIGHT: 63 IN | DIASTOLIC BLOOD PRESSURE: 70 MMHG | SYSTOLIC BLOOD PRESSURE: 114 MMHG | BODY MASS INDEX: 23.21 KG/M2 | WEIGHT: 131 LBS

## 2024-07-11 DIAGNOSIS — E83.19 IRON OVERLOAD: ICD-10-CM

## 2024-07-11 DIAGNOSIS — M89.9 LESION OF LUMBAR SPINE: ICD-10-CM

## 2024-07-11 DIAGNOSIS — R91.8 LUNG NODULES: Primary | ICD-10-CM

## 2024-07-11 DIAGNOSIS — D32.9 MENINGIOMA (HCC): ICD-10-CM

## 2024-07-11 PROCEDURE — 99215 OFFICE O/P EST HI 40 MIN: CPT | Performed by: INTERNAL MEDICINE

## 2024-07-11 NOTE — PROGRESS NOTES
Hematology/Oncology Outpatient Follow-up  Mary Ashby 63 y.o. female 1960 [id number]    Date:  7/11/2024        Assessment and Plan:  1. Lung nodules  She had a CT scan of the chest on 5/20/2024 which showed stable multiple nonspecific small pulmonary nodules measuring up to 4 mm a follow-up CT scan was recommended in 3 months.  Lung CT scan will be ordered prior to her next visit.  - CT lung nodule follow-up; Future    2. Meningioma (HCC)  Status post resection of the left sphenoid wing to brain mass which showed meningioma, CNS WHO grade 1.  The patient was asked to follow-up with the neurosurgical team who is planning to pursue frequent brain MRI to rule out any hint of recurrence of the meningioma.    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    This rare mutation is also well-known to cause colorectal malignancy and less frequently breast cancer and skin cancer.  We had extensive discussion about the mutation and the risk of different malignancy.  She will need annual upper and lower endoscopy for close monitoring.  She will also need to be seen by the dermatologist annually.    3. Iron overload  She seems to have significant iron overload with high iron saturation and high ferritin.  Imaging of the liver suggested also iron deposition in the liver.  The patient was offered hemochromatosis genetic testing and phlebotomy to be done on a monthly basis to try to bring down her ferritin around the range of 50 with close monitoring of her hemoglobin hematocrit to avoid anemia.    - CBC and differential; Future  - Comprehensive metabolic panel; Future  - Iron Panel (Includes Ferritin, Iron Sat%, Iron, and TIBC); Future  - Magnesium; Future  - Ferritin; Future  - Hemochromatosis mutation; Future  - CBC and differential; Standing  - Comprehensive metabolic panel; Standing  - Ferritin;  Standing  - Iron Panel (Includes Ferritin, Iron Sat%, Iron, and TIBC); Standing    4. Lesion of lumbar spine  She had an MRI of the lumbar spine on 5/16/2024 which showed the redemonstration of bilateral right greater than left intramuscular masses with heterogeneous signal which may represent multiple intramuscular hemangiomata.  MRI of the lumbar spine was recommended in 6 months which will be done prior to her next visit.    - MRI lumbar spine w wo contrast; Future        HPI:  This is a 63-year-old female with a remote history of endometrial cancer status post hysterectomy in 1991, hypertension, thyroid disease, etc.  She stated that her family history is full of malignancy including breast cancer in her mother, maternal aunt and cousin.  Colon cancer parental uncle had melanoma in her brother.  The patient has a history of multiple benign colon polyps which requires colonoscopy on every other year basis.  The patient apparently complained about neurological symptoms including difficulties with her memory and finding words.  This resulted in an MRI of the brain which was done on 1/23/2024 and showed:    IMPRESSION:     4.3 cm probable meningioma in anterior aspect of left middle cranial fossa with small dural feeder vessels, diffuse surrounding vasogenic edema (left frontal, left subinsular, left posterior limb of internal capsule, left parietal, and left temporal   lobes), compressive mass effect on adjacent left temporal lobe and left lateral ventricle, 0.7 cm rightward midline shift, left uncal herniation, and rightward shift of upper brainstem. Recommend neurosurgical consultation for further evaluation.     NeuroQuant analysis was performed:   Normal study however this is confounded by a left middle cranial fossa mass and the results of this study are likely inconclusive but overall does not support neurodegeneration.     The patient was then immediately admitted to the hospital for further evaluation.  She  did have CTA of the head which showed the large dural based extra-axial mass measuring 3.6 cm with surrounding vasogenic edema.     CT chest abdomen pelvis on 1/24/2024 showed:  IMPRESSION:     1.  No evidence of a primary malignancy in the chest, abdomen, or pelvis.  2.  Multiple bilateral pulmonary nodules measuring 4 mm or less are indeterminate in the absence of comparison imaging. Given ongoing malignancy work-up, recommend follow-up chest CT in 3 months to exclude metastatic disease.  3.  Multiple hypoattenuating hepatic lesions similarly are indeterminate in the absence of comparison imaging. The largest of these, approximately 1 cm, measure slightly above fluid density. In light of ongoing malignancy work-up, recommend further   characterization with contrast-enhanced abdominal MRI.  4.  Multiple ill-defined foci of enhancement in the lumbar paraspinal musculature, at least 1 of these measuring 1.6 cm does appear somewhat rounded and masslike. Metastatic disease cannot be excluded. The more superior of these lesions should be   included in the field-of-view on the recommended abdominal MRI which may aid in further characterization. PET/CT may also be considered for further assessment.     The patient then had MRI of the abdomen for further evaluation of the hepatic cysts on 1/25/2024 which showed:  IMPRESSION:     No findings suspicious for malignancy in the abdomen.     Tiny hepatic cysts, considered benign findings. Evidence of hepatic iron deposition. Correlate for prior blood transfusions and hemochromatosis.     Findings most compatible with gallbladder cholesterolosis/tiny polyps, typically benign. Recommend confirmation with right upper quadrant ultrasound.     At the same day she also had an MRI of the lumbar spine for further evaluation of the lumbar paraspinal intramuscular lesions which showed:     IMPRESSION:     Paraspinal intramuscular masses. Imaging morphology most compatible with  hemangiomas. Myxoma, sarcoma and metastases are considered less likely.     No evidence of bone or intracanalicular neoplasm.     Minimal degenerative disc disease. L4-L5 facet osteoarthritis with minimal degenerative anterolisthesis. No stenosis.     She then had resection of the left sphenoid wing brain mass on 1/30/2024.  The pathology came back compatible with meningioma, CNS WHO grade 1.     She also had an ultrasound of the abdomen for further evaluation of the gallbladder polyps on 2/28/2024 which showed:  IMPRESSION:     Multiple gallbladder polyps measuring up to 6 mm. According to current consensus recommendations (SRU 2022; 000:1-12), for polyps of this size ( <=  6 mm) which have a low risk morphology, no follow-up is recommended.      Interval history:  Patient came there for follow-up visit.  She did have genetic testing through the genetic oncology team and was found to have biallelic NTHL1 alteration.  Blood work from 4/24/2024 showed hemoglobin of 14.1 with a white cell count of 3.8.  Platelet count was normal.  Iron panel showed saturation of 72% with ferritin of 621.  CMP was entirely normal.  ROS: Review of Systems   Constitutional:  Positive for fatigue. Negative for chills and fever.   HENT:  Positive for mouth sores. Negative for ear pain and sore throat.    Eyes:  Negative for pain and visual disturbance.   Respiratory:  Negative for cough and shortness of breath.    Cardiovascular:  Negative for chest pain and palpitations.   Gastrointestinal:  Negative for abdominal pain and vomiting.   Genitourinary:  Negative for dysuria and hematuria.   Musculoskeletal:  Negative for arthralgias and back pain.   Skin:  Negative for color change and rash.   Neurological:  Positive for headaches. Negative for seizures and syncope.   All other systems reviewed and are negative.      Past Medical History:   Diagnosis Date    Arthritis     BRCA1 negative     Disease of thyroid gland     Endometrial cancer  (HCC)     Hypertension January 3 2023    Thyroid disease     Uterus cancer (HCC)        Past Surgical History:   Procedure Laterality Date    CRANIOTOMY Left 2024    Procedure: IMAGE GUIDED LEFT PTERIONAL CRANIOTOMY FOR TUMOR;  Surgeon: Placido Lowery MD;  Location: [facility];  Service: Neurosurgery    HYSTERECTOMY      still has ovaries    IR CEREBRAL ANGIOGRAPHY / INTERVENTION  2024       Social History     Socioeconomic History    Marital status: /Civil Union     Spouse name: None    Number of children: None    Years of education: None    Highest education level: None   Occupational History    None   Tobacco Use    Smoking status: Former     Current packs/day: 0.00     Types: Cigarettes     Quit date: 1984     Years since quittin.5    Smokeless tobacco: Never   Vaping Use    Vaping status: Never Used   Substance and Sexual Activity    Alcohol use: Yes     Alcohol/week: 3.0 standard drinks of alcohol     Types: 3 Glasses of wine per week     Comment: social    Drug use: No    Sexual activity: Not Currently     Partners: Male     Birth control/protection: Surgical     Comment: Hysterectomy    Other Topics Concern    None   Social History Narrative    None     Social Determinants of Health     Financial Resource Strain: Not on file   Food Insecurity: No Food Insecurity (2024)    Hunger Vital Sign     Worried About Running Out of Food in the Last Year: Never true     Ran Out of Food in the Last Year: Never true   Transportation Needs: No Transportation Needs (2024)    PRAPARE - Transportation     Lack of Transportation (Medical): No     Lack of Transportation (Non-Medical): No   Physical Activity: Not on file   Stress: Not on file   Social Connections: Unknown (2024)    Received from [facility]    Social Connections     How often do you feel lonely or isolated from those around you? (Adult - for ages 18 years and over): Not on file   Intimate Partner Violence: Not on  "file   Housing Stability: Low Risk  (1/26/2024)    Housing Stability Vital Sign     Unable to Pay for Housing in the Last Year: No     Number of Times Moved in the Last Year: 1     Homeless in the Last Year: No       Family History   Problem Relation Age of Onset    Breast cancer Mother 50    No Known Problems Father     Cancer Sister     No Known Problems Daughter     No Known Problems Maternal Grandmother     No Known Problems Maternal Grandfather     No Known Problems Paternal Grandmother     No Known Problems Paternal Grandfather     Lymphoma Brother     Melanoma Brother 50    No Known Problems Son     Skin cancer Maternal Aunt     Breast cancer Maternal Aunt     Colon cancer Paternal Uncle     Breast cancer Cousin        No Known Allergies      Current Outpatient Medications:     amLODIPine (NORVASC) 5 mg tablet, take 1 tablet by mouth once daily, Disp: 30 tablet, Rfl: 5    aspirin (ECOTRIN LOW STRENGTH) 81 mg EC tablet, Take 81 mg by mouth daily, Disp: , Rfl:     FLUoxetine (PROzac) 20 mg capsule, TAKE 1 CAPSULE (20 MG TOTAL) BY MOUTH DAILY, Disp: 90 capsule, Rfl: 1    levothyroxine 75 mcg tablet, TAKE 1 TABLET (75 MCG TOTAL) BY MOUTH DAILY, Disp: 90 tablet, Rfl: 1      Physical Exam:  /70 (BP Location: Left arm, Patient Position: Sitting, Cuff Size: Adult)   Pulse (!) 49   Temp 97.8 °F (36.6 °C)   Resp 18   Ht 5' 3\" (1.6 m)   Wt 59.4 kg (131 lb)   LMP  (LMP Unknown)   SpO2 97%   BMI 23.21 kg/m²     Physical Exam  Constitutional:       General: She is not in acute distress.     Appearance: She is well-developed. She is not diaphoretic.   HENT:      Head: Normocephalic and atraumatic.      Nose: Nose normal.   Eyes:      General: No scleral icterus.        Right eye: No discharge.         Left eye: No discharge.      Conjunctiva/sclera: Conjunctivae normal.      Pupils: Pupils are equal, round, and reactive to light.   Neck:      Thyroid: No thyromegaly.      Vascular: No JVD.      Trachea: No " tracheal deviation.   Cardiovascular:      Rate and Rhythm: Normal rate and regular rhythm.      Heart sounds: Normal heart sounds. No murmur heard.     No friction rub.   Pulmonary:      Effort: Pulmonary effort is normal. No respiratory distress.      Breath sounds: Normal breath sounds. No stridor. No wheezing or rales.   Chest:      Chest wall: No tenderness.   Abdominal:      General: There is no distension.      Palpations: Abdomen is soft. There is no hepatomegaly or splenomegaly.      Tenderness: There is no abdominal tenderness. There is no guarding or rebound.   Musculoskeletal:         General: No tenderness or deformity. Normal range of motion.      Cervical back: Normal range of motion and neck supple.   Lymphadenopathy:      Cervical: No cervical adenopathy.   Skin:     General: Skin is warm and dry.      Coloration: Skin is not pale.      Findings: No erythema or rash.   Neurological:      Mental Status: She is alert and oriented to person, place, and time.      Cranial Nerves: No cranial nerve deficit.      Coordination: Coordination normal.      Deep Tendon Reflexes: Reflexes are normal and symmetric.   Psychiatric:         Behavior: Behavior normal.         Thought Content: Thought content normal.         Judgment: Judgment normal.           Labs:  Lab Results   Component Value Date    WBC 3.89 (L) 04/24/2024    HGB 14.1 04/24/2024    HCT 42.6 04/24/2024    MCV 92 04/24/2024     04/24/2024     Lab Results   Component Value Date    K 4.0 04/24/2024     04/24/2024    CO2 29 04/24/2024    BUN 12 04/24/2024    CREATININE 0.61 04/24/2024    GLUF 80 04/24/2024    CALCIUM 9.2 04/24/2024    AST 20 04/24/2024    ALT 19 04/24/2024    ALKPHOS 69 04/24/2024    EGFR 98 04/24/2024     Lab Results   Component Value Date    TSH 2.14 04/22/2019       Patient voiced understanding and agreement in the above discussion. Aware to contact our office with questions/symptoms in the interim.

## 2024-07-11 NOTE — TELEPHONE ENCOUNTER
Patient was driving when called, asked to be called again closer to 10:30 AM when she would be home to schedule.

## 2024-07-19 ENCOUNTER — HOSPITAL ENCOUNTER (OUTPATIENT)
Dept: INFUSION CENTER | Facility: HOSPITAL | Age: 64
End: 2024-07-19
Attending: INTERNAL MEDICINE
Payer: COMMERCIAL

## 2024-07-19 VITALS
OXYGEN SATURATION: 100 % | HEART RATE: 68 BPM | TEMPERATURE: 98.3 F | RESPIRATION RATE: 18 BRPM | DIASTOLIC BLOOD PRESSURE: 62 MMHG | SYSTOLIC BLOOD PRESSURE: 130 MMHG

## 2024-07-19 DIAGNOSIS — E83.19 IRON OVERLOAD: Primary | ICD-10-CM

## 2024-07-19 PROCEDURE — 99195 PHLEBOTOMY: CPT

## 2024-07-19 PROCEDURE — 96360 HYDRATION IV INFUSION INIT: CPT

## 2024-07-19 RX ADMIN — SODIUM CHLORIDE 1000 ML: 0.9 INJECTION, SOLUTION INTRAVENOUS at 09:49

## 2024-07-19 NOTE — PROGRESS NOTES
Therapeutic phlebotomy parameters reviewed with Liza Mcdowell RN This is patients first therapeutic phleb.   Phlebotomy done using Left basilic vein.  Start time: 0939  End time: 0948  250 ml removed per orders using Katherine scale. Pressure dressing applied. Patient complains of feeling dizzy and lightheaded at the end of phlebotomy. Pts B/P was  63/33 22g IV started in LFA NSS hung wide open. Patient was pale, diaphoretic and cool to touch. Patient was able to drink cranberry and orange juice and eat yg crackers. Patient did respond well to the NSS and began feeling better quickly. Rin Mcdowell RN made aware. Patient will receive hydration moving forward with phlebotomies. Patient did leave unit in stable condtion with no complaints offered. Her ending B/P was 130/62.

## 2024-07-25 ENCOUNTER — OFFICE VISIT (OUTPATIENT)
Dept: URGENT CARE | Facility: CLINIC | Age: 64
End: 2024-07-25
Payer: COMMERCIAL

## 2024-07-25 VITALS
RESPIRATION RATE: 16 BRPM | OXYGEN SATURATION: 97 % | DIASTOLIC BLOOD PRESSURE: 76 MMHG | SYSTOLIC BLOOD PRESSURE: 120 MMHG | TEMPERATURE: 98.4 F | BODY MASS INDEX: 23.4 KG/M2 | WEIGHT: 132.13 LBS | HEART RATE: 80 BPM

## 2024-07-25 DIAGNOSIS — J02.9 ACUTE PHARYNGITIS, UNSPECIFIED ETIOLOGY: ICD-10-CM

## 2024-07-25 DIAGNOSIS — J30.2 SEASONAL ALLERGIES: ICD-10-CM

## 2024-07-25 DIAGNOSIS — J04.0 ACUTE LARYNGITIS: Primary | ICD-10-CM

## 2024-07-25 DIAGNOSIS — R05.1 ACUTE COUGH: ICD-10-CM

## 2024-07-25 PROCEDURE — 99213 OFFICE O/P EST LOW 20 MIN: CPT | Performed by: PHYSICIAN ASSISTANT

## 2024-07-25 RX ORDER — BROMPHENIRAMINE MALEATE, PSEUDOEPHEDRINE HYDROCHLORIDE, AND DEXTROMETHORPHAN HYDROBROMIDE 2; 30; 10 MG/5ML; MG/5ML; MG/5ML
5 SYRUP ORAL 3 TIMES DAILY PRN
Qty: 120 ML | Refills: 0 | Status: SHIPPED | OUTPATIENT
Start: 2024-07-25

## 2024-07-25 RX ORDER — METHYLPREDNISOLONE 4 MG/1
TABLET ORAL
Qty: 21 TABLET | Refills: 0 | Status: SHIPPED | OUTPATIENT
Start: 2024-07-25

## 2024-07-25 NOTE — PATIENT INSTRUCTIONS
Discussed how symptoms are likely allergy related.   Recommended oral steroid and cough medication to use as needed.   Advised to try a humidifier in her room and to take warm steamy showers to help moisten her throat to help with the laryngitis.     Follow up with PCP in 3-5 days.  Proceed to  ER if symptoms worsen.    If tests are performed, our office will contact you with results only if changes need to made to the care plan discussed with you at the visit. You can review your full results on [facility].

## 2024-07-25 NOTE — PROGRESS NOTES
[facility]        NAME: Mary Ashby is a 63 y.o. female  : 1960    MRN: [id number]  DATE: 2024  TIME: 3:05 PM    Assessment and Plan   Acute laryngitis [J04.0]  1. Acute laryngitis  methylPREDNISolone 4 MG tablet therapy pack      2. Seasonal allergies        3. Acute pharyngitis, unspecified etiology  methylPREDNISolone 4 MG tablet therapy pack      4. Acute cough  brompheniramine-pseudoephedrine-DM 30-2-10 MG/5ML syrup            Patient Instructions     Patient Instructions   Discussed how symptoms are likely allergy related.   Recommended oral steroid and cough medication to use as needed.   Advised to try a humidifier in her room and to take warm steamy showers to help moisten her throat to help with the laryngitis.     Follow up with PCP in 3-5 days.  Proceed to  ER if symptoms worsen.    If tests are performed, our office will contact you with results only if changes need to made to the care plan discussed with you at the visit. [facility]        Chief Complaint     Chief Complaint   Patient presents with    Sore Throat     Started 4-5 days ago. Fatigue. Coughing. Negative home covid test. No fever or chills. Headaches, ear popping, sneezing.          History of Present Illness       Sore Throat   This is a new problem. The current episode started in the past 7 days. The problem has been unchanged. There has been no fever. The pain is mild. Associated symptoms include congestion, coughing, ear pain, headaches and a hoarse voice. Pertinent negatives include no shortness of breath, swollen glands, trouble swallowing or vomiting. She has tried nothing for the symptoms.       Review of Systems   Review of Systems   Constitutional:  Negative for activity change, appetite change, chills, diaphoresis, fatigue and fever.   HENT:  Positive for congestion, ear pain, hoarse voice and sore throat. Negative for trouble swallowing.    Respiratory:   Positive for cough. Negative for shortness of breath.    Gastrointestinal:  Negative for vomiting.   Neurological:  Positive for headaches.   All other systems reviewed and are negative.        Current Medications       Current Outpatient Medications:     amLODIPine (NORVASC) 5 mg tablet, take 1 tablet by mouth once daily, Disp: 30 tablet, Rfl: 5    aspirin (ECOTRIN LOW STRENGTH) 81 mg EC tablet, Take 81 mg by mouth daily, Disp: , Rfl:     brompheniramine-pseudoephedrine-DM 30-2-10 MG/5ML syrup, Take 5 mL by mouth 3 (three) times a day as needed for allergies or cough, Disp: 120 mL, Rfl: 0    FLUoxetine (PROzac) 20 mg capsule, TAKE 1 CAPSULE (20 MG TOTAL) BY MOUTH DAILY, Disp: 90 capsule, Rfl: 1    levothyroxine 75 mcg tablet, TAKE 1 TABLET (75 MCG TOTAL) BY MOUTH DAILY, Disp: 90 tablet, Rfl: 1    methylPREDNISolone 4 MG tablet therapy pack, Use as directed on package, Disp: 21 tablet, Rfl: 0    Current Allergies     Allergies as of 07/25/2024    (No Known Allergies)            The following portions of the patient's history were reviewed and updated as appropriate: allergies, current medications, past family history, past medical history, past social history, past surgical history and problem list.     Past Medical History:   Diagnosis Date    Arthritis     BRCA1 negative     Disease of thyroid gland     Endometrial cancer (HCC) 1991    Hypertension January 3 2023    Thyroid disease     Uterus cancer (HCC)        Past Surgical History:   Procedure Laterality Date    CRANIOTOMY Left 1/30/2024    Procedure: IMAGE GUIDED LEFT PTERIONAL CRANIOTOMY FOR TUMOR;  Surgeon: Placido Lowery MD;  Location: [facility];  Service: Neurosurgery    HYSTERECTOMY      still has ovaries    IR CEREBRAL ANGIOGRAPHY / INTERVENTION  1/29/2024       Family History   Problem Relation Age of Onset    Breast cancer Mother 50    No Known Problems Father     Cancer Sister     No Known Problems Daughter     No Known Problems Maternal Grandmother      No Known Problems Maternal Grandfather     No Known Problems Paternal Grandmother     No Known Problems Paternal Grandfather     Lymphoma Brother     Melanoma Brother 50    No Known Problems Son     Skin cancer Maternal Aunt     Breast cancer Maternal Aunt     Colon cancer Paternal Uncle     Breast cancer Cousin          Medications have been verified.        Objective   /76   Pulse 80   Temp 98.4 °F (36.9 °C)   Resp 16   Wt 59.9 kg (132 lb 2 oz)   LMP  (LMP Unknown)   SpO2 97%   BMI 23.40 kg/m²        Physical Exam     Physical Exam  Vitals and nursing note reviewed.   Constitutional:       Appearance: Normal appearance.   HENT:      Right Ear: Ear canal and external ear normal. A middle ear effusion is present.      Left Ear: Ear canal and external ear normal. A middle ear effusion is present.      Nose: Nose normal.      Mouth/Throat:      Mouth: Mucous membranes are dry.      Pharynx: No oropharyngeal exudate or posterior oropharyngeal erythema.   Eyes:      Pupils: Pupils are equal, round, and reactive to light.   Cardiovascular:      Rate and Rhythm: Normal rate and regular rhythm.   Pulmonary:      Effort: Pulmonary effort is normal.      Breath sounds: Normal breath sounds.   Skin:     General: Skin is warm and dry.      Capillary Refill: Capillary refill takes less than 2 seconds.   Neurological:      General: No focal deficit present.      Mental Status: She is alert and oriented to person, place, and time.   Psychiatric:         Mood and Affect: Mood normal.         Behavior: Behavior normal.

## 2024-08-01 ENCOUNTER — OFFICE VISIT (OUTPATIENT)
Dept: FAMILY MEDICINE CLINIC | Facility: CLINIC | Age: 64
End: 2024-08-01
Payer: COMMERCIAL

## 2024-08-01 VITALS
OXYGEN SATURATION: 97 % | BODY MASS INDEX: 23.57 KG/M2 | WEIGHT: 133 LBS | HEIGHT: 63 IN | TEMPERATURE: 97.5 F | DIASTOLIC BLOOD PRESSURE: 72 MMHG | HEART RATE: 72 BPM | SYSTOLIC BLOOD PRESSURE: 128 MMHG

## 2024-08-01 DIAGNOSIS — E83.19 IRON OVERLOAD: ICD-10-CM

## 2024-08-01 DIAGNOSIS — D32.9 MENINGIOMA (HCC): ICD-10-CM

## 2024-08-01 DIAGNOSIS — Z15.89 GENE MUTATION: ICD-10-CM

## 2024-08-01 DIAGNOSIS — R91.8 LUNG NODULES: ICD-10-CM

## 2024-08-01 DIAGNOSIS — I10 PRIMARY HYPERTENSION: Primary | ICD-10-CM

## 2024-08-01 DIAGNOSIS — M89.9 LESION OF LUMBAR SPINE: ICD-10-CM

## 2024-08-01 DIAGNOSIS — E83.10 IRON STORAGE DISORDER: ICD-10-CM

## 2024-08-01 PROCEDURE — 99214 OFFICE O/P EST MOD 30 MIN: CPT | Performed by: PHYSICIAN ASSISTANT

## 2024-08-01 NOTE — PROGRESS NOTES
Ambulatory Visit  Name: Mary Ashby      : 1960      MRN: [id number]  Encounter Provider: Sinan Palacios PA-C  Encounter Date: 2024   Encounter department: [facility]    Assessment & Plan   1. Primary hypertension  2. Meningioma (HCC)  3. Iron overload  4. Lesion of lumbar spine  5. Lung nodules  6. Iron storage disorder  7. Gene mutation     Hold amlodipine the morning of phlebotomy. Hydrate. MRI lumbar spine and CT chest as planned. Continue with heme and NS. Normal neuro exam today. BP at goal. 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 Routine imaging. No new neurologic complaints    Hx of iron overload, hemachromatosis, receives phlebotomy. BP went low (60s/30s) at infusion center last time. She is on amlodipine for HTN. BP has been well controlled. No longer on ASA. She follows with hematology    We routinely monitor lung nodules with CT and lumbar paraspinal lesions with MRI       Review of Systems   Constitutional:  Negative for chills, fatigue and fever.   HENT:  Negative for congestion, ear pain, hearing loss, nosebleeds, postnasal drip, rhinorrhea, sinus pressure, sinus pain, sneezing and sore throat.    Eyes:  Negative for pain, discharge, itching and visual disturbance.   Respiratory:  Negative for cough, chest tightness, shortness of breath and wheezing.    Cardiovascular:  Negative for chest pain, palpitations and leg swelling.   Gastrointestinal:  Negative for abdominal pain, blood in stool, constipation, diarrhea, nausea and vomiting.   Genitourinary:  Negative for frequency and urgency.   Neurological: Negative.  Negative for dizziness, light-headedness and numbness.     Past Medical History:   Diagnosis Date    Arthritis     BRCA1 negative     Disease of thyroid gland     Endometrial cancer (HCC)     Hypertension January 3 2023     Thyroid disease     Uterus cancer (HCC)      Past Surgical History:   Procedure Laterality Date    CRANIOTOMY Left 2024    Procedure: IMAGE GUIDED LEFT PTERIONAL CRANIOTOMY FOR TUMOR;  Surgeon: Placido Lowery MD;  Location: [facility];  Service: Neurosurgery    HYSTERECTOMY      still has ovaries    IR CEREBRAL ANGIOGRAPHY / INTERVENTION  2024     Family History   Problem Relation Age of Onset    Breast cancer Mother 50    No Known Problems Father     Cancer Sister     No Known Problems Daughter     No Known Problems Maternal Grandmother     No Known Problems Maternal Grandfather     No Known Problems Paternal Grandmother     No Known Problems Paternal Grandfather     Lymphoma Brother     Melanoma Brother 50    No Known Problems Son     Skin cancer Maternal Aunt     Breast cancer Maternal Aunt     Colon cancer Paternal Uncle     Breast cancer Cousin      Social History     Tobacco Use    Smoking status: Former     Current packs/day: 0.00     Types: Cigarettes     Quit date: 1984     Years since quittin.6    Smokeless tobacco: Never   Vaping Use    Vaping status: Never Used   Substance and Sexual Activity    Alcohol use: Yes     Alcohol/week: 3.0 standard drinks of alcohol     Types: 3 Glasses of wine per week     Comment: social    Drug use: No    Sexual activity: Not Currently     Partners: Male     Birth control/protection: Surgical     Comment: Hysterectomy      Current Outpatient Medications on File Prior to Visit   Medication Sig    amLODIPine (NORVASC) 5 mg tablet take 1 tablet by mouth once daily    aspirin (ECOTRIN LOW STRENGTH) 81 mg EC tablet Take 81 mg by mouth daily    brompheniramine-pseudoephedrine-DM 30-2-10 MG/5ML syrup Take 5 mL by mouth 3 (three) times a day as needed for allergies or cough    FLUoxetine (PROzac) 20 mg capsule TAKE 1 CAPSULE (20 MG TOTAL) BY MOUTH DAILY    levothyroxine 75 mcg tablet TAKE 1 TABLET (75 MCG TOTAL) BY MOUTH DAILY    methylPREDNISolone 4 MG  "tablet therapy pack Use as directed on package     No Known Allergies  Immunization History   Administered Date(s) Administered    COVID-19 PFIZER VACCINE 0.3 ML IM 05/19/2021, 06/09/2021, 01/11/2022    COVID-19 Pfizer Vac BIVALENT Augusto-sucrose 12 Yr+ IM 10/18/2022     Objective     /72   Pulse 72   Temp 97.5 °F (36.4 °C)   Ht 5' 3\" (1.6 m)   Wt 60.3 kg (133 lb)   LMP  (LMP Unknown)   SpO2 97%   BMI 23.56 kg/m²     Physical Exam  Vitals and nursing note reviewed.   Constitutional:       General: She is not in acute distress.     Appearance: She is well-developed.   HENT:      Head: Normocephalic and atraumatic.   Eyes:      Conjunctiva/sclera: Conjunctivae normal.   Cardiovascular:      Rate and Rhythm: Normal rate and regular rhythm.      Heart sounds: No murmur heard.  Pulmonary:      Effort: Pulmonary effort is normal. No respiratory distress.      Breath sounds: Normal breath sounds. No wheezing, rhonchi or rales.   Musculoskeletal:         General: No swelling.      Cervical back: Neck supple.   Skin:     General: Skin is warm and dry.      Capillary Refill: Capillary refill takes less than 2 seconds.   Neurological:      General: No focal deficit present.      Mental Status: She is alert and oriented to person, place, and time.      Cranial Nerves: No cranial nerve deficit.      Sensory: No sensory deficit.      Motor: No weakness.      Coordination: Coordination normal.      Gait: Gait normal.   Psychiatric:         Mood and Affect: Mood normal.         "

## 2024-08-12 ENCOUNTER — APPOINTMENT (OUTPATIENT)
Dept: LAB | Facility: CLINIC | Age: 64
End: 2024-08-12
Payer: COMMERCIAL

## 2024-08-12 DIAGNOSIS — Z01.818 PRE-PROCEDURAL EXAMINATION: ICD-10-CM

## 2024-08-12 DIAGNOSIS — E83.19 IRON OVERLOAD: ICD-10-CM

## 2024-08-12 LAB
ALBUMIN SERPL BCG-MCNC: 4 G/DL (ref 3.5–5)
ALP SERPL-CCNC: 77 U/L (ref 34–104)
ALT SERPL W P-5'-P-CCNC: 18 U/L (ref 7–52)
ANION GAP SERPL CALCULATED.3IONS-SCNC: 6 MMOL/L (ref 4–13)
AST SERPL W P-5'-P-CCNC: 21 U/L (ref 13–39)
BASOPHILS # BLD AUTO: 0.03 THOUSANDS/ÂΜL (ref 0–0.1)
BASOPHILS NFR BLD AUTO: 1 % (ref 0–1)
BILIRUB SERPL-MCNC: 0.87 MG/DL (ref 0.2–1)
BUN SERPL-MCNC: 14 MG/DL (ref 5–25)
CALCIUM SERPL-MCNC: 9.3 MG/DL (ref 8.4–10.2)
CHLORIDE SERPL-SCNC: 103 MMOL/L (ref 96–108)
CO2 SERPL-SCNC: 31 MMOL/L (ref 21–32)
CREAT SERPL-MCNC: 0.65 MG/DL (ref 0.6–1.3)
EOSINOPHIL # BLD AUTO: 0.03 THOUSAND/ÂΜL (ref 0–0.61)
EOSINOPHIL NFR BLD AUTO: 1 % (ref 0–6)
ERYTHROCYTE [DISTWIDTH] IN BLOOD BY AUTOMATED COUNT: 13.5 % (ref 11.6–15.1)
FERRITIN SERPL-MCNC: 679 NG/ML (ref 11–307)
GFR SERPL CREATININE-BSD FRML MDRD: 96 ML/MIN/1.73SQ M
GLUCOSE SERPL-MCNC: 59 MG/DL (ref 65–140)
HCT VFR BLD AUTO: 40 % (ref 34.8–46.1)
HGB BLD-MCNC: 13.5 G/DL (ref 11.5–15.4)
IMM GRANULOCYTES # BLD AUTO: 0.01 THOUSAND/UL (ref 0–0.2)
IMM GRANULOCYTES NFR BLD AUTO: 0 % (ref 0–2)
IRON SERPL-MCNC: 187 UG/DL (ref 50–212)
LYMPHOCYTES # BLD AUTO: 1.29 THOUSANDS/ÂΜL (ref 0.6–4.47)
LYMPHOCYTES NFR BLD AUTO: 30 % (ref 14–44)
MCH RBC QN AUTO: 30.8 PG (ref 26.8–34.3)
MCHC RBC AUTO-ENTMCNC: 33.8 G/DL (ref 31.4–37.4)
MCV RBC AUTO: 91 FL (ref 82–98)
MONOCYTES # BLD AUTO: 0.47 THOUSAND/ÂΜL (ref 0.17–1.22)
MONOCYTES NFR BLD AUTO: 11 % (ref 4–12)
NEUTROPHILS # BLD AUTO: 2.43 THOUSANDS/ÂΜL (ref 1.85–7.62)
NEUTS SEG NFR BLD AUTO: 57 % (ref 43–75)
NRBC BLD AUTO-RTO: 0 /100 WBCS
PLATELET # BLD AUTO: 183 THOUSANDS/UL (ref 149–390)
PMV BLD AUTO: 10.4 FL (ref 8.9–12.7)
POTASSIUM SERPL-SCNC: 4.2 MMOL/L (ref 3.5–5.3)
PROT SERPL-MCNC: 6.6 G/DL (ref 6.4–8.4)
RBC # BLD AUTO: 4.39 MILLION/UL (ref 3.81–5.12)
SODIUM SERPL-SCNC: 140 MMOL/L (ref 135–147)
UIBC SERPL-MCNC: <55 UG/DL (ref 155–355)
WBC # BLD AUTO: 4.26 THOUSAND/UL (ref 4.31–10.16)

## 2024-08-12 PROCEDURE — 80053 COMPREHEN METABOLIC PANEL: CPT

## 2024-08-12 PROCEDURE — 85025 COMPLETE CBC W/AUTO DIFF WBC: CPT

## 2024-08-12 PROCEDURE — 83550 IRON BINDING TEST: CPT

## 2024-08-12 PROCEDURE — 82728 ASSAY OF FERRITIN: CPT

## 2024-08-12 PROCEDURE — 36415 COLL VENOUS BLD VENIPUNCTURE: CPT

## 2024-08-12 PROCEDURE — 83540 ASSAY OF IRON: CPT

## 2024-08-15 ENCOUNTER — HOSPITAL ENCOUNTER (OUTPATIENT)
Dept: MRI IMAGING | Facility: HOSPITAL | Age: 64
End: 2024-08-15
Attending: NEUROLOGICAL SURGERY
Payer: COMMERCIAL

## 2024-08-15 DIAGNOSIS — D32.9 MENINGIOMA (HCC): ICD-10-CM

## 2024-08-15 PROCEDURE — A9585 GADOBUTROL INJECTION: HCPCS | Performed by: NEUROLOGICAL SURGERY

## 2024-08-15 PROCEDURE — 70553 MRI BRAIN STEM W/O & W/DYE: CPT

## 2024-08-15 RX ORDER — GADOBUTROL 604.72 MG/ML
10 INJECTION INTRAVENOUS
Status: COMPLETED | OUTPATIENT
Start: 2024-08-15 | End: 2024-08-15

## 2024-08-15 RX ADMIN — GADOBUTROL 10 ML: 604.72 INJECTION INTRAVENOUS at 11:30

## 2024-08-16 ENCOUNTER — HOSPITAL ENCOUNTER (OUTPATIENT)
Dept: INFUSION CENTER | Facility: HOSPITAL | Age: 64
End: 2024-08-16
Attending: INTERNAL MEDICINE
Payer: COMMERCIAL

## 2024-08-16 VITALS
HEART RATE: 69 BPM | TEMPERATURE: 97.9 F | RESPIRATION RATE: 16 BRPM | SYSTOLIC BLOOD PRESSURE: 153 MMHG | OXYGEN SATURATION: 97 % | DIASTOLIC BLOOD PRESSURE: 76 MMHG

## 2024-08-16 DIAGNOSIS — E83.19 IRON OVERLOAD: Primary | ICD-10-CM

## 2024-08-16 PROCEDURE — 96360 HYDRATION IV INFUSION INIT: CPT

## 2024-08-16 PROCEDURE — 99195 PHLEBOTOMY: CPT

## 2024-08-16 RX ADMIN — SODIUM CHLORIDE 1000 ML: 0.9 INJECTION, SOLUTION INTRAVENOUS at 08:30

## 2024-08-16 NOTE — PROGRESS NOTES
Pt here for Therapeutic Phlebotomy. Pt offered no acute complaints today. Vitals pre stable. NSS Bolus started pre Therapeutic Phlebotomy per order.   Muycdoky=486. Within parameter for tx today. Verified with second RN Liza NOYOLA.     250ml taken from LAC by Ashlee Arora per order (Start=0915, Stop=0921) pressure dressing with coban applied. Pt tolerated well.Pt given snack and juice pre and post procedure. Pt completed bolus and monitored post.  Vitals post stable and pt feeling well.    Pt left in stable condition.     Pt aware of next appt 9/13 at 1030. Declined AVS.

## 2024-08-22 ENCOUNTER — OFFICE VISIT (OUTPATIENT)
Dept: NEUROSURGERY | Facility: CLINIC | Age: 64
End: 2024-08-22
Payer: COMMERCIAL

## 2024-08-22 VITALS
OXYGEN SATURATION: 99 % | HEART RATE: 82 BPM | HEIGHT: 63 IN | BODY MASS INDEX: 24.1 KG/M2 | SYSTOLIC BLOOD PRESSURE: 142 MMHG | RESPIRATION RATE: 17 BRPM | TEMPERATURE: 98.2 F | DIASTOLIC BLOOD PRESSURE: 84 MMHG | WEIGHT: 136 LBS

## 2024-08-22 DIAGNOSIS — D32.9 MENINGIOMA (HCC): Primary | ICD-10-CM

## 2024-08-22 PROCEDURE — 99214 OFFICE O/P EST MOD 30 MIN: CPT | Performed by: NEUROLOGICAL SURGERY

## 2024-08-22 NOTE — PROGRESS NOTES
Patient Id: Mary Ashby is a 63 y.o. female        Handedness: Right      Assessment/Plan:    Diagnoses and all orders for this visit:    Meningioma (HCC)  -     MRI brain with and without contrast; Future        Discussion and summary:   WHO grade 1 sphenoid wing meningioma status post resection 1/30/2024.  She is now 6 months status post resection of her WHO grade 1 sphenoid wing meningioma on the left.  She has been doing very well.  Her complaints of head pressure and jaw pain have improved significantly.  She also believes that her energy and speech problems have resolved.  However, she still sometimes requires a nap and is fatigued at the long day but otherwise has been doing well    We once again discussed natural history and diagnosis of WHO grade 1 meningiomas and expected follow-up.  At this juncture we will plan for an MRI in 6 months in accordance with NCCN guidelines.  After this we will plan to transition to annual imaging.     2.  Memory difficulties.  This appears to be significantly improved.  Her friend with her today states that she seems to be more at her baseline.    I have spent a total time of 30 minutes in caring for this patient on the day of the visit/encounter including Diagnostic results, Prognosis, Risks and benefits of tx options, Instructions for management, Patient and family education, Importance of tx compliance, Risk factor reductions, Impressions, Counseling / Coordination of care, Documenting in the medical record, Reviewing / ordering tests, medicine, procedures  , and Obtaining or reviewing history  .       Chief Complaint: Follow-up        HPI:   This is a very pleasant 63-year-old female who presented with speech difficulties and was found to have a large left sphenoid wing meningioma after obtaining an outpatient imaging study.  She ultimately underwent embolization and resection on January 30, 2024.     She is now 6-month status post resection.  She has been doing well.   She has no significant complaints.  She still has some mild fatigue at the end of the day even this is improving.  Unfortunately she recently lost her  and has been transitioning into this unfortunate change.     Her past medical history is significant for hypertension.     She is .  She is a Christianity and has outlined her previous wishes regarding blood transfusion if necessary.  She has one daughter age 36 and one son who is 38.  She is not currently employed.  She denies tobacco and illicit drug use.  She has not had any alcohol since surgery.  She denies any Neurologic family history.    Review of systems obtained by the MA reviewed and updated below.    Review of Systems   Constitutional: Negative.    HENT: Negative.     Eyes: Negative.    Respiratory: Negative.     Cardiovascular: Negative.    Gastrointestinal: Negative.    Endocrine: Negative.    Genitourinary: Negative.    Musculoskeletal:  Positive for gait problem.   Skin: Negative.    Allergic/Immunologic: Negative.    Neurological:  Negative for dizziness, seizures, speech difficulty, weakness, light-headedness, numbness and headaches.   Hematological:  Bruises/bleeds easily.   Psychiatric/Behavioral:  Negative for behavioral problems and confusion.        Physical Exam  Vitals:    08/22/24 0936   BP: 142/84   Pulse: 82   Resp: 17   Temp: 98.2 °F (36.8 °C)   SpO2: 99%   She is well appearing.  Affect is appropriate. Body mass index is 24.09 kg/m².. She is awake alert and oriented.  Hearing and vision are grossly intact.   Her pupils are equal round reactive to light.  Her extraocular movements are intact.  Her face is symmetric.  Tongue is midline.  Facial sensation is intact and symmetric throughout.  Shoulder shrug is 5/5.  There is no drift or dysmetria.     She has full strength in her bilateral upper and lower extremities.  Her gait is normal.     Her heart rate is regular.  Normal respiratory effort.  Abdomen nondistended.   Radial pulses 2+.     Incision is well-healed.  Mild temporal atrophy.    The following portions of the patient's history were reviewed and updated as appropriate: allergies, current medications, past family history, past medical history, past social history, past surgical history, and problem list.    Active Ambulatory Problems     Diagnosis Date Noted    Hypothyroidism 07/21/2016    Anxiety 04/26/2023    Meningioma (HCC) 01/23/2024    Lung nodules 01/25/2024    Hepatic lesion 01/25/2024    Abnormal CT scan, lumbar spine 01/25/2024    Gallbladder polyp 01/26/2024    Primary hypertension 02/02/2024    Iron overload 07/11/2024    Lesion of lumbar spine 07/11/2024     Resolved Ambulatory Problems     Diagnosis Date Noted    Vitamin D deficiency 07/28/2016    Lipid screening 05/01/2018    Need for hepatitis C screening test 05/01/2018    Encounter for gynecological examination 05/15/2018    Encounter for gynecological examination (general) (routine) without abnormal findings 11/22/2022     Past Medical History:   Diagnosis Date    Arthritis     BRCA1 negative     Disease of thyroid gland     Endometrial cancer (HCC) 1991    Hypertension January 3 2023    Thyroid disease     Uterus cancer (HCC)        Past Surgical History:   Procedure Laterality Date    CRANIOTOMY Left 1/30/2024    Procedure: IMAGE GUIDED LEFT PTERIONAL CRANIOTOMY FOR TUMOR;  Surgeon: Placido Lowery MD;  Location: [facility];  Service: Neurosurgery    HYSTERECTOMY      still has ovaries    IR CEREBRAL ANGIOGRAPHY / INTERVENTION  1/29/2024         Current Outpatient Medications:     amLODIPine (NORVASC) 5 mg tablet, take 1 tablet by mouth once daily, Disp: 30 tablet, Rfl: 5    FLUoxetine (PROzac) 20 mg capsule, TAKE 1 CAPSULE (20 MG TOTAL) BY MOUTH DAILY, Disp: 90 capsule, Rfl: 1    levothyroxine 75 mcg tablet, TAKE 1 TABLET (75 MCG TOTAL) BY MOUTH DAILY, Disp: 90 tablet, Rfl: 1    brompheniramine-pseudoephedrine-DM 30-2-10 MG/5ML syrup, Take 5 mL by  mouth 3 (three) times a day as needed for allergies or cough, Disp: 120 mL, Rfl: 0    Results/Data: Imaging personally reviewed in detail with patient as well as reports. Of note, no evidence of recurrent disease..

## 2024-09-03 ENCOUNTER — TELEPHONE (OUTPATIENT)
Dept: HEMATOLOGY ONCOLOGY | Facility: CLINIC | Age: 64
End: 2024-09-03

## 2024-09-03 ENCOUNTER — TELEPHONE (OUTPATIENT)
Age: 64
End: 2024-09-03

## 2024-09-03 NOTE — TELEPHONE ENCOUNTER
Pt is asking if there is a specific test that was to be scheduled per Dr. De Los Santos's last office visit.    Pt said she was called to schedule but was in the car driving and missed the call.    (Genetic lest to be done in the Hospital re her high Iron).

## 2024-09-03 NOTE — TELEPHONE ENCOUNTER
Phoned pt to let her know that the genetic molecular test hemochromatosis mutation has been authorized and she can phone Central Scheduling to schedule this as this lab has to be drawn at a hospital.

## 2024-09-09 ENCOUNTER — TELEPHONE (OUTPATIENT)
Age: 64
End: 2024-09-09

## 2024-09-09 NOTE — TELEPHONE ENCOUNTER
Pt had a colonoscopy last Wednesday and her GI doctor prescribed omeprozole. She wanted to ask Dr. De Los Santos if he thought it was ok for her to take

## 2024-09-09 NOTE — TELEPHONE ENCOUNTER
Call out to patient post review with Dr. Filiberto Vallejo, RN patient is able to take omeprazole. Patient aware and appreciative of call.

## 2024-09-10 ENCOUNTER — APPOINTMENT (OUTPATIENT)
Dept: LAB | Facility: CLINIC | Age: 64
End: 2024-09-10
Payer: COMMERCIAL

## 2024-09-10 DIAGNOSIS — E83.19 IRON OVERLOAD: ICD-10-CM

## 2024-09-10 LAB
ALBUMIN SERPL BCG-MCNC: 4.2 G/DL (ref 3.5–5)
ALP SERPL-CCNC: 78 U/L (ref 34–104)
ALT SERPL W P-5'-P-CCNC: 16 U/L (ref 7–52)
ANION GAP SERPL CALCULATED.3IONS-SCNC: 8 MMOL/L (ref 4–13)
AST SERPL W P-5'-P-CCNC: 19 U/L (ref 13–39)
BASOPHILS # BLD AUTO: 0.01 THOUSANDS/ÂΜL (ref 0–0.1)
BASOPHILS NFR BLD AUTO: 0 % (ref 0–1)
BILIRUB SERPL-MCNC: 0.79 MG/DL (ref 0.2–1)
BUN SERPL-MCNC: 11 MG/DL (ref 5–25)
CALCIUM SERPL-MCNC: 9.4 MG/DL (ref 8.4–10.2)
CHLORIDE SERPL-SCNC: 103 MMOL/L (ref 96–108)
CO2 SERPL-SCNC: 29 MMOL/L (ref 21–32)
CREAT SERPL-MCNC: 0.6 MG/DL (ref 0.6–1.3)
EOSINOPHIL # BLD AUTO: 0.03 THOUSAND/ÂΜL (ref 0–0.61)
EOSINOPHIL NFR BLD AUTO: 1 % (ref 0–6)
ERYTHROCYTE [DISTWIDTH] IN BLOOD BY AUTOMATED COUNT: 12.9 % (ref 11.6–15.1)
FERRITIN SERPL-MCNC: 431 NG/ML (ref 11–307)
GFR SERPL CREATININE-BSD FRML MDRD: 98 ML/MIN/1.73SQ M
GLUCOSE P FAST SERPL-MCNC: 81 MG/DL (ref 65–99)
HCT VFR BLD AUTO: 40.4 % (ref 34.8–46.1)
HGB BLD-MCNC: 13.1 G/DL (ref 11.5–15.4)
IMM GRANULOCYTES # BLD AUTO: 0.01 THOUSAND/UL (ref 0–0.2)
IMM GRANULOCYTES NFR BLD AUTO: 0 % (ref 0–2)
IRON SATN MFR SERPL: 51 % (ref 15–50)
IRON SERPL-MCNC: 116 UG/DL (ref 50–212)
LYMPHOCYTES # BLD AUTO: 1.01 THOUSANDS/ÂΜL (ref 0.6–4.47)
LYMPHOCYTES NFR BLD AUTO: 26 % (ref 14–44)
MCH RBC QN AUTO: 30.2 PG (ref 26.8–34.3)
MCHC RBC AUTO-ENTMCNC: 32.4 G/DL (ref 31.4–37.4)
MCV RBC AUTO: 93 FL (ref 82–98)
MONOCYTES # BLD AUTO: 0.35 THOUSAND/ÂΜL (ref 0.17–1.22)
MONOCYTES NFR BLD AUTO: 9 % (ref 4–12)
NEUTROPHILS # BLD AUTO: 2.46 THOUSANDS/ÂΜL (ref 1.85–7.62)
NEUTS SEG NFR BLD AUTO: 64 % (ref 43–75)
NRBC BLD AUTO-RTO: 0 /100 WBCS
PLATELET # BLD AUTO: 170 THOUSANDS/UL (ref 149–390)
PMV BLD AUTO: 10.5 FL (ref 8.9–12.7)
POTASSIUM SERPL-SCNC: 4.4 MMOL/L (ref 3.5–5.3)
PROT SERPL-MCNC: 6.6 G/DL (ref 6.4–8.4)
RBC # BLD AUTO: 4.34 MILLION/UL (ref 3.81–5.12)
SODIUM SERPL-SCNC: 140 MMOL/L (ref 135–147)
TIBC SERPL-MCNC: 229 UG/DL (ref 250–450)
UIBC SERPL-MCNC: 113 UG/DL (ref 155–355)
WBC # BLD AUTO: 3.87 THOUSAND/UL (ref 4.31–10.16)

## 2024-09-10 PROCEDURE — 80053 COMPREHEN METABOLIC PANEL: CPT

## 2024-09-10 PROCEDURE — 83540 ASSAY OF IRON: CPT

## 2024-09-10 PROCEDURE — 83550 IRON BINDING TEST: CPT

## 2024-09-10 PROCEDURE — 82728 ASSAY OF FERRITIN: CPT

## 2024-09-10 PROCEDURE — 85025 COMPLETE CBC W/AUTO DIFF WBC: CPT

## 2024-09-10 PROCEDURE — 36415 COLL VENOUS BLD VENIPUNCTURE: CPT

## 2024-09-13 ENCOUNTER — TELEPHONE (OUTPATIENT)
Dept: FAMILY MEDICINE CLINIC | Facility: CLINIC | Age: 64
End: 2024-09-13

## 2024-09-13 ENCOUNTER — HOSPITAL ENCOUNTER (OUTPATIENT)
Dept: INFUSION CENTER | Facility: HOSPITAL | Age: 64
End: 2024-09-13
Attending: INTERNAL MEDICINE
Payer: COMMERCIAL

## 2024-09-13 VITALS
RESPIRATION RATE: 16 BRPM | TEMPERATURE: 97.7 F | HEART RATE: 60 BPM | DIASTOLIC BLOOD PRESSURE: 68 MMHG | SYSTOLIC BLOOD PRESSURE: 110 MMHG

## 2024-09-13 DIAGNOSIS — E83.19 IRON OVERLOAD: Primary | ICD-10-CM

## 2024-09-13 PROCEDURE — 99195 PHLEBOTOMY: CPT

## 2024-09-13 PROCEDURE — 96360 HYDRATION IV INFUSION INIT: CPT

## 2024-09-13 RX ORDER — OMEPRAZOLE 40 MG/1
40 CAPSULE, DELAYED RELEASE ORAL 2 TIMES DAILY
COMMUNITY
Start: 2024-09-04

## 2024-09-13 RX ADMIN — SODIUM CHLORIDE 1000 ML: 0.9 INJECTION, SOLUTION INTRAVENOUS at 10:46

## 2024-09-13 NOTE — PROGRESS NOTES
IV NSS hydration tolerated.  Therapeutic phlebotomy parameters reviewed with Sonia Xie RN Phlebotomy done using Left lower forearm.  Start time: 1141.  End time: 1154.   250 ml removed per orders using Katherine scale. Pressure dressing applied. Snack and drink given. Patient offers no complaints. Denies dizziness or light headedness. Vital signs stable at discharge.      Mary Ashby is aware of future appt on 10/11/24 at 0900.     AVS -  No (Declined by Mary Ashby) Patient has Mychart.    Patient ambulated off unit without incident.  All personal belongings taken with patient.

## 2024-09-13 NOTE — TELEPHONE ENCOUNTER
----- Message from Natalie Barroso PA-C sent at 9/13/2024  7:35 AM EDT -----  Sinan toledo     Her iron is elevated - looks like she already follows with hematology for this as it is chronic; continue following with them for management.   WBC low but again this appears chronic, needs to follow with hematology for this.   Kidney and liver function normal.   Sugar normal.    Yes

## 2024-09-26 ENCOUNTER — APPOINTMENT (OUTPATIENT)
Dept: LAB | Facility: HOSPITAL | Age: 64
End: 2024-09-26
Attending: INTERNAL MEDICINE
Payer: COMMERCIAL

## 2024-09-26 DIAGNOSIS — E83.19 IRON OVERLOAD: ICD-10-CM

## 2024-09-26 PROCEDURE — 36415 COLL VENOUS BLD VENIPUNCTURE: CPT

## 2024-09-26 PROCEDURE — 81256 HFE GENE: CPT

## 2024-10-07 ENCOUNTER — APPOINTMENT (OUTPATIENT)
Dept: LAB | Facility: CLINIC | Age: 64
End: 2024-10-07
Payer: COMMERCIAL

## 2024-10-07 DIAGNOSIS — E83.19 IRON OVERLOAD: ICD-10-CM

## 2024-10-07 LAB
ALBUMIN SERPL BCG-MCNC: 4.3 G/DL (ref 3.5–5)
ALP SERPL-CCNC: 78 U/L (ref 34–104)
ALT SERPL W P-5'-P-CCNC: 16 U/L (ref 7–52)
ANION GAP SERPL CALCULATED.3IONS-SCNC: 7 MMOL/L (ref 4–13)
AST SERPL W P-5'-P-CCNC: 20 U/L (ref 13–39)
BASOPHILS # BLD AUTO: 0.01 THOUSANDS/ΜL (ref 0–0.1)
BASOPHILS NFR BLD AUTO: 0 % (ref 0–1)
BILIRUB SERPL-MCNC: 0.66 MG/DL (ref 0.2–1)
BUN SERPL-MCNC: 12 MG/DL (ref 5–25)
CALCIUM SERPL-MCNC: 9.2 MG/DL (ref 8.4–10.2)
CHLORIDE SERPL-SCNC: 101 MMOL/L (ref 96–108)
CO2 SERPL-SCNC: 30 MMOL/L (ref 21–32)
CREAT SERPL-MCNC: 0.64 MG/DL (ref 0.6–1.3)
EOSINOPHIL # BLD AUTO: 0.06 THOUSAND/ΜL (ref 0–0.61)
EOSINOPHIL NFR BLD AUTO: 2 % (ref 0–6)
ERYTHROCYTE [DISTWIDTH] IN BLOOD BY AUTOMATED COUNT: 12.4 % (ref 11.6–15.1)
FERRITIN SERPL-MCNC: 546 NG/ML (ref 11–307)
GFR SERPL CREATININE-BSD FRML MDRD: 95 ML/MIN/1.73SQ M
GLUCOSE SERPL-MCNC: 76 MG/DL (ref 65–140)
HCT VFR BLD AUTO: 40.9 % (ref 34.8–46.1)
HGB BLD-MCNC: 13.6 G/DL (ref 11.5–15.4)
IMM GRANULOCYTES # BLD AUTO: 0 THOUSAND/UL (ref 0–0.2)
IMM GRANULOCYTES NFR BLD AUTO: 0 % (ref 0–2)
IRON SATN MFR SERPL: 58 % (ref 15–50)
IRON SERPL-MCNC: 146 UG/DL (ref 50–212)
LYMPHOCYTES # BLD AUTO: 1.02 THOUSANDS/ΜL (ref 0.6–4.47)
LYMPHOCYTES NFR BLD AUTO: 33 % (ref 14–44)
MCH RBC QN AUTO: 31.1 PG (ref 26.8–34.3)
MCHC RBC AUTO-ENTMCNC: 33.3 G/DL (ref 31.4–37.4)
MCV RBC AUTO: 93 FL (ref 82–98)
MONOCYTES # BLD AUTO: 0.35 THOUSAND/ΜL (ref 0.17–1.22)
MONOCYTES NFR BLD AUTO: 11 % (ref 4–12)
NEUTROPHILS # BLD AUTO: 1.68 THOUSANDS/ΜL (ref 1.85–7.62)
NEUTS SEG NFR BLD AUTO: 54 % (ref 43–75)
NRBC BLD AUTO-RTO: 0 /100 WBCS
PLATELET # BLD AUTO: 158 THOUSANDS/UL (ref 149–390)
PMV BLD AUTO: 10.3 FL (ref 8.9–12.7)
POTASSIUM SERPL-SCNC: 4.1 MMOL/L (ref 3.5–5.3)
PROT SERPL-MCNC: 6.7 G/DL (ref 6.4–8.4)
RBC # BLD AUTO: 4.38 MILLION/UL (ref 3.81–5.12)
SODIUM SERPL-SCNC: 138 MMOL/L (ref 135–147)
TIBC SERPL-MCNC: 252 UG/DL (ref 250–450)
UIBC SERPL-MCNC: 106 UG/DL (ref 155–355)
WBC # BLD AUTO: 3.12 THOUSAND/UL (ref 4.31–10.16)

## 2024-10-07 PROCEDURE — 83540 ASSAY OF IRON: CPT

## 2024-10-07 PROCEDURE — 85025 COMPLETE CBC W/AUTO DIFF WBC: CPT

## 2024-10-07 PROCEDURE — 83550 IRON BINDING TEST: CPT

## 2024-10-07 PROCEDURE — 36415 COLL VENOUS BLD VENIPUNCTURE: CPT

## 2024-10-07 PROCEDURE — 80053 COMPREHEN METABOLIC PANEL: CPT

## 2024-10-07 PROCEDURE — 82728 ASSAY OF FERRITIN: CPT

## 2024-10-09 LAB
HFE GENE MUT ANL BLD/T: NORMAL
Lab: NORMAL

## 2024-10-11 ENCOUNTER — HOSPITAL ENCOUNTER (OUTPATIENT)
Dept: INFUSION CENTER | Facility: HOSPITAL | Age: 64
End: 2024-10-11
Attending: INTERNAL MEDICINE
Payer: COMMERCIAL

## 2024-10-11 VITALS
SYSTOLIC BLOOD PRESSURE: 133 MMHG | DIASTOLIC BLOOD PRESSURE: 73 MMHG | RESPIRATION RATE: 18 BRPM | TEMPERATURE: 98.2 F | HEART RATE: 66 BPM

## 2024-10-11 DIAGNOSIS — E83.19 IRON OVERLOAD: Primary | ICD-10-CM

## 2024-10-11 PROCEDURE — 96360 HYDRATION IV INFUSION INIT: CPT

## 2024-10-11 PROCEDURE — 99195 PHLEBOTOMY: CPT

## 2024-10-11 RX ADMIN — SODIUM CHLORIDE 1000 ML: 0.9 INJECTION, SOLUTION INTRAVENOUS at 09:21

## 2024-10-11 NOTE — PROGRESS NOTES
Hydration given without incident. Therapeutic phlebotomy parameters reviewed with Liza Mcdowell RN Phlebotomy done using Left basilic vein.  Start time: 1000.  End time: 1006  250 ml removed per orders using Katherine scale. Pressure dressing applied. Snack and drink given. Patient offers no complaints. Denies dizziness or light headedness. Vital signs stable at discharge.  AVS given to patient. Patient is aware of their appointment on 11-8-24 at 900.

## 2024-10-28 NOTE — TELEPHONE ENCOUNTER
I phoned the patient and introduced myself and indicated that I was calling from [facility] regarding her upcoming appointment. Mary indicated that she did see that this appointment was made, so we briefly went over the details (3/5, Dr. De Los Santos, [place] office, 0900), the office location, and that the appointment details (including the office phone number) are in her MyChart. Mary indicated that her daughter helps her with her MyChart and will be able to access the appointment information. We reviewed that this visit is a follow up from her recent hospitalization and specifically for review/discussion of the CT chest/abdomen/pelvis taken while hospitalized. Mary expressed understanding and was appreciative of the call.   
DISPLAY PLAN FREE TEXT

## 2024-10-31 ENCOUNTER — TELEPHONE (OUTPATIENT)
Age: 64
End: 2024-10-31

## 2024-10-31 NOTE — TELEPHONE ENCOUNTER
Spoke with patient, Breast MRI order was placed in patients chart back in May, order faxed over to scheduling. Patient will receive order for mammogram in December when she has her annual exam.patient not due for mammogram until end of January please advise

## 2024-10-31 NOTE — TELEPHONE ENCOUNTER
Patient called and in need of mammogram script and breast mri.          She goes to [facility].      It is [facility] so you shouldn't have to fax but they gave her a fax [phone].          Please call to notify her once scripts has been placed so she is aware to call central scheduling.

## 2024-11-05 ENCOUNTER — APPOINTMENT (OUTPATIENT)
Dept: LAB | Facility: CLINIC | Age: 64
End: 2024-11-05
Payer: COMMERCIAL

## 2024-11-05 DIAGNOSIS — E83.19 IRON OVERLOAD: ICD-10-CM

## 2024-11-05 LAB
ALBUMIN SERPL BCG-MCNC: 4.3 G/DL (ref 3.5–5)
ALP SERPL-CCNC: 84 U/L (ref 34–104)
ALT SERPL W P-5'-P-CCNC: 17 U/L (ref 7–52)
ANION GAP SERPL CALCULATED.3IONS-SCNC: 8 MMOL/L (ref 4–13)
AST SERPL W P-5'-P-CCNC: 20 U/L (ref 13–39)
BASOPHILS # BLD AUTO: 0.02 THOUSANDS/ΜL (ref 0–0.1)
BASOPHILS NFR BLD AUTO: 1 % (ref 0–1)
BILIRUB SERPL-MCNC: 0.63 MG/DL (ref 0.2–1)
BUN SERPL-MCNC: 13 MG/DL (ref 5–25)
CALCIUM SERPL-MCNC: 9.3 MG/DL (ref 8.4–10.2)
CHLORIDE SERPL-SCNC: 103 MMOL/L (ref 96–108)
CO2 SERPL-SCNC: 29 MMOL/L (ref 21–32)
CREAT SERPL-MCNC: 0.64 MG/DL (ref 0.6–1.3)
EOSINOPHIL # BLD AUTO: 0.04 THOUSAND/ΜL (ref 0–0.61)
EOSINOPHIL NFR BLD AUTO: 1 % (ref 0–6)
ERYTHROCYTE [DISTWIDTH] IN BLOOD BY AUTOMATED COUNT: 12.1 % (ref 11.6–15.1)
FERRITIN SERPL-MCNC: 458 NG/ML (ref 11–307)
GFR SERPL CREATININE-BSD FRML MDRD: 95 ML/MIN/1.73SQ M
GLUCOSE SERPL-MCNC: 55 MG/DL (ref 65–140)
HCT VFR BLD AUTO: 42.9 % (ref 34.8–46.1)
HGB BLD-MCNC: 14.2 G/DL (ref 11.5–15.4)
IMM GRANULOCYTES # BLD AUTO: 0.01 THOUSAND/UL (ref 0–0.2)
IMM GRANULOCYTES NFR BLD AUTO: 0 % (ref 0–2)
IRON SATN MFR SERPL: 57 % (ref 15–50)
IRON SERPL-MCNC: 140 UG/DL (ref 50–212)
LYMPHOCYTES # BLD AUTO: 1.05 THOUSANDS/ΜL (ref 0.6–4.47)
LYMPHOCYTES NFR BLD AUTO: 30 % (ref 14–44)
MCH RBC QN AUTO: 30.1 PG (ref 26.8–34.3)
MCHC RBC AUTO-ENTMCNC: 33.1 G/DL (ref 31.4–37.4)
MCV RBC AUTO: 91 FL (ref 82–98)
MONOCYTES # BLD AUTO: 0.37 THOUSAND/ΜL (ref 0.17–1.22)
MONOCYTES NFR BLD AUTO: 10 % (ref 4–12)
NEUTROPHILS # BLD AUTO: 2.07 THOUSANDS/ΜL (ref 1.85–7.62)
NEUTS SEG NFR BLD AUTO: 58 % (ref 43–75)
NRBC BLD AUTO-RTO: 0 /100 WBCS
PLATELET # BLD AUTO: 171 THOUSANDS/UL (ref 149–390)
PMV BLD AUTO: 10.4 FL (ref 8.9–12.7)
POTASSIUM SERPL-SCNC: 4.1 MMOL/L (ref 3.5–5.3)
PROT SERPL-MCNC: 6.8 G/DL (ref 6.4–8.4)
RBC # BLD AUTO: 4.71 MILLION/UL (ref 3.81–5.12)
SODIUM SERPL-SCNC: 140 MMOL/L (ref 135–147)
TIBC SERPL-MCNC: 245 UG/DL (ref 250–450)
UIBC SERPL-MCNC: 105 UG/DL (ref 155–355)
WBC # BLD AUTO: 3.56 THOUSAND/UL (ref 4.31–10.16)

## 2024-11-05 PROCEDURE — 82728 ASSAY OF FERRITIN: CPT

## 2024-11-05 PROCEDURE — 83550 IRON BINDING TEST: CPT

## 2024-11-05 PROCEDURE — 83540 ASSAY OF IRON: CPT

## 2024-11-05 PROCEDURE — 80053 COMPREHEN METABOLIC PANEL: CPT

## 2024-11-05 PROCEDURE — 36415 COLL VENOUS BLD VENIPUNCTURE: CPT

## 2024-11-05 PROCEDURE — 85025 COMPLETE CBC W/AUTO DIFF WBC: CPT

## 2024-11-07 DIAGNOSIS — E03.8 OTHER SPECIFIED HYPOTHYROIDISM: ICD-10-CM

## 2024-11-07 RX ORDER — LEVOTHYROXINE SODIUM 75 UG/1
75 TABLET ORAL DAILY
Qty: 90 TABLET | Refills: 1 | Status: SHIPPED | OUTPATIENT
Start: 2024-11-07

## 2024-11-08 ENCOUNTER — HOSPITAL ENCOUNTER (OUTPATIENT)
Dept: INFUSION CENTER | Facility: HOSPITAL | Age: 64
End: 2024-11-08
Attending: INTERNAL MEDICINE
Payer: COMMERCIAL

## 2024-11-08 VITALS
OXYGEN SATURATION: 100 % | SYSTOLIC BLOOD PRESSURE: 124 MMHG | DIASTOLIC BLOOD PRESSURE: 60 MMHG | TEMPERATURE: 97.4 F | RESPIRATION RATE: 16 BRPM | HEART RATE: 62 BPM

## 2024-11-08 DIAGNOSIS — E83.19 IRON OVERLOAD: Primary | ICD-10-CM

## 2024-11-08 PROCEDURE — 96360 HYDRATION IV INFUSION INIT: CPT

## 2024-11-08 PROCEDURE — 99195 PHLEBOTOMY: CPT

## 2024-11-08 RX ADMIN — SODIUM CHLORIDE 1000 ML: 0.9 INJECTION, SOLUTION INTRAVENOUS at 09:17

## 2024-11-08 NOTE — PROGRESS NOTES
Mary Ashby  tolerated IV hydration well with no complications. 250mL blood removed from right AC without incident using machine (start 956, end 959). VSS pre and post treatment and food/drink given. Pt discharged in stable condition.    Mary Ashby is aware of future appt on 12/6 at 9AM.     AVS declined by to Mary Ashyb.

## 2024-11-25 ENCOUNTER — HOSPITAL ENCOUNTER (OUTPATIENT)
Dept: MRI IMAGING | Facility: HOSPITAL | Age: 64
Discharge: HOME | End: 2024-11-25
Attending: INTERNAL MEDICINE
Payer: COMMERCIAL

## 2024-11-25 ENCOUNTER — HOSPITAL ENCOUNTER (OUTPATIENT)
Dept: CT IMAGING | Facility: HOSPITAL | Age: 64
Discharge: HOME | End: 2024-11-25
Attending: INTERNAL MEDICINE
Payer: COMMERCIAL

## 2024-11-25 DIAGNOSIS — M89.9 LESION OF LUMBAR SPINE: ICD-10-CM

## 2024-11-25 DIAGNOSIS — R91.8 LUNG NODULES: ICD-10-CM

## 2024-11-25 PROCEDURE — A9585 GADOBUTROL INJECTION: HCPCS | Performed by: INTERNAL MEDICINE

## 2024-11-25 PROCEDURE — 71250 CT THORAX DX C-: CPT

## 2024-11-25 PROCEDURE — 72158 MRI LUMBAR SPINE W/O & W/DYE: CPT

## 2024-11-25 RX ORDER — GADOBUTROL 604.72 MG/ML
6 INJECTION INTRAVENOUS
Status: COMPLETED | OUTPATIENT
Start: 2024-11-25 | End: 2024-11-25

## 2024-11-25 RX ADMIN — GADOBUTROL 6 ML: 604.72 INJECTION INTRAVENOUS at 12:04

## 2024-12-02 ENCOUNTER — APPOINTMENT (OUTPATIENT)
Dept: LAB | Facility: CLINIC | Age: 64
End: 2024-12-02
Payer: COMMERCIAL

## 2024-12-02 DIAGNOSIS — E83.19 IRON OVERLOAD: ICD-10-CM

## 2024-12-02 LAB
ALBUMIN SERPL BCG-MCNC: 4.4 G/DL (ref 3.5–5)
ALP SERPL-CCNC: 86 U/L (ref 34–104)
ALT SERPL W P-5'-P-CCNC: 13 U/L (ref 7–52)
ANION GAP SERPL CALCULATED.3IONS-SCNC: 5 MMOL/L (ref 4–13)
AST SERPL W P-5'-P-CCNC: 17 U/L (ref 13–39)
BASOPHILS # BLD AUTO: 0.01 THOUSANDS/ΜL (ref 0–0.1)
BASOPHILS NFR BLD AUTO: 0 % (ref 0–1)
BILIRUB SERPL-MCNC: 0.66 MG/DL (ref 0.2–1)
BUN SERPL-MCNC: 13 MG/DL (ref 5–25)
CALCIUM SERPL-MCNC: 9.1 MG/DL (ref 8.4–10.2)
CHLORIDE SERPL-SCNC: 104 MMOL/L (ref 96–108)
CO2 SERPL-SCNC: 30 MMOL/L (ref 21–32)
CREAT SERPL-MCNC: 0.62 MG/DL (ref 0.6–1.3)
EOSINOPHIL # BLD AUTO: 0.04 THOUSAND/ΜL (ref 0–0.61)
EOSINOPHIL NFR BLD AUTO: 1 % (ref 0–6)
ERYTHROCYTE [DISTWIDTH] IN BLOOD BY AUTOMATED COUNT: 11.9 % (ref 11.6–15.1)
FERRITIN SERPL-MCNC: 487 NG/ML (ref 11–307)
GFR SERPL CREATININE-BSD FRML MDRD: 96 ML/MIN/1.73SQ M
GLUCOSE P FAST SERPL-MCNC: 75 MG/DL (ref 65–99)
HCT VFR BLD AUTO: 42.9 % (ref 34.8–46.1)
HGB BLD-MCNC: 14.1 G/DL (ref 11.5–15.4)
IMM GRANULOCYTES # BLD AUTO: 0.01 THOUSAND/UL (ref 0–0.2)
IMM GRANULOCYTES NFR BLD AUTO: 0 % (ref 0–2)
IRON SATN MFR SERPL: 60 % (ref 15–50)
IRON SERPL-MCNC: 158 UG/DL (ref 50–212)
LYMPHOCYTES # BLD AUTO: 0.93 THOUSANDS/ΜL (ref 0.6–4.47)
LYMPHOCYTES NFR BLD AUTO: 25 % (ref 14–44)
MAGNESIUM SERPL-MCNC: 2 MG/DL (ref 1.9–2.7)
MCH RBC QN AUTO: 29.9 PG (ref 26.8–34.3)
MCHC RBC AUTO-ENTMCNC: 32.9 G/DL (ref 31.4–37.4)
MCV RBC AUTO: 91 FL (ref 82–98)
MONOCYTES # BLD AUTO: 0.33 THOUSAND/ΜL (ref 0.17–1.22)
MONOCYTES NFR BLD AUTO: 9 % (ref 4–12)
NEUTROPHILS # BLD AUTO: 2.35 THOUSANDS/ΜL (ref 1.85–7.62)
NEUTS SEG NFR BLD AUTO: 65 % (ref 43–75)
NRBC BLD AUTO-RTO: 0 /100 WBCS
PLATELET # BLD AUTO: 175 THOUSANDS/UL (ref 149–390)
PMV BLD AUTO: 10 FL (ref 8.9–12.7)
POTASSIUM SERPL-SCNC: 4.4 MMOL/L (ref 3.5–5.3)
PROT SERPL-MCNC: 6.8 G/DL (ref 6.4–8.4)
RBC # BLD AUTO: 4.71 MILLION/UL (ref 3.81–5.12)
SODIUM SERPL-SCNC: 139 MMOL/L (ref 135–147)
TIBC SERPL-MCNC: 264 UG/DL (ref 250–450)
UIBC SERPL-MCNC: 106 UG/DL (ref 155–355)
WBC # BLD AUTO: 3.67 THOUSAND/UL (ref 4.31–10.16)

## 2024-12-02 PROCEDURE — 83540 ASSAY OF IRON: CPT

## 2024-12-02 PROCEDURE — 82728 ASSAY OF FERRITIN: CPT

## 2024-12-02 PROCEDURE — 80053 COMPREHEN METABOLIC PANEL: CPT

## 2024-12-02 PROCEDURE — 36415 COLL VENOUS BLD VENIPUNCTURE: CPT

## 2024-12-02 PROCEDURE — 83735 ASSAY OF MAGNESIUM: CPT

## 2024-12-02 PROCEDURE — 85025 COMPLETE CBC W/AUTO DIFF WBC: CPT

## 2024-12-02 PROCEDURE — 83550 IRON BINDING TEST: CPT

## 2024-12-03 ENCOUNTER — TELEPHONE (OUTPATIENT)
Age: 64
End: 2024-12-03

## 2024-12-03 NOTE — TELEPHONE ENCOUNTER
Provider:  Dr. De Los Santos    Patient calling in inquiring if the amount removed for her phlebotomy can be changed to 300mL-350mL from 250mL. She reports she feels fine, but that she thinks it would help her Ferritin levels better.      I confirmed with patient we would discuss with provider and make her aware of update.     Patient has next phlebotomy on Friday 12/6 and sees Dr. De Los Santos next on 12/18.    She has no additional questions or concerns at this moment.

## 2024-12-06 ENCOUNTER — HOSPITAL ENCOUNTER (OUTPATIENT)
Dept: INFUSION CENTER | Facility: HOSPITAL | Age: 64
End: 2024-12-06
Attending: INTERNAL MEDICINE
Payer: COMMERCIAL

## 2024-12-06 VITALS
DIASTOLIC BLOOD PRESSURE: 76 MMHG | TEMPERATURE: 96.9 F | OXYGEN SATURATION: 98 % | RESPIRATION RATE: 18 BRPM | HEART RATE: 75 BPM | SYSTOLIC BLOOD PRESSURE: 123 MMHG

## 2024-12-06 DIAGNOSIS — E83.19 IRON OVERLOAD: Primary | ICD-10-CM

## 2024-12-06 PROCEDURE — 99195 PHLEBOTOMY: CPT

## 2024-12-06 PROCEDURE — 96360 HYDRATION IV INFUSION INIT: CPT

## 2024-12-06 RX ADMIN — SODIUM CHLORIDE 1000 ML: 0.9 INJECTION, SOLUTION INTRAVENOUS at 09:02

## 2024-12-06 NOTE — PROGRESS NOTES
Mary Ashby  had Therapeutic phlebotomy done without incident. parameters reviewed with Myranda Arora RN Phlebotomy done using Left basilic vein.  Start time: 0945  End time: 0948   300 ml removed per orders using Katherine scale. 1 Liter of NSS hydration given prior to phlebotomy. Pressure dressing applied. Snack and drink given. Patient offers no complaints. Denies dizziness or light headedness. Vital signs stable at discharge.     Mary Ashby is aware of future appt on 1-3-25 at 9am    AVS declined by Mary Ashby Appt card provided.

## 2024-12-10 ENCOUNTER — ANNUAL EXAM (OUTPATIENT)
Dept: OBGYN CLINIC | Facility: CLINIC | Age: 64
End: 2024-12-10
Payer: COMMERCIAL

## 2024-12-10 VITALS
WEIGHT: 131 LBS | BODY MASS INDEX: 24.11 KG/M2 | DIASTOLIC BLOOD PRESSURE: 58 MMHG | SYSTOLIC BLOOD PRESSURE: 114 MMHG | HEIGHT: 62 IN

## 2024-12-10 DIAGNOSIS — Z12.31 ENCOUNTER FOR SCREENING MAMMOGRAM FOR BREAST CANCER: ICD-10-CM

## 2024-12-10 DIAGNOSIS — Z01.419 ENCOUNTER FOR ANNUAL ROUTINE GYNECOLOGICAL EXAMINATION: Primary | ICD-10-CM

## 2024-12-10 DIAGNOSIS — Z91.89 AT RISK FOR GENETIC DISORDER: ICD-10-CM

## 2024-12-10 PROCEDURE — S0612 ANNUAL GYNECOLOGICAL EXAMINA: HCPCS | Performed by: OBSTETRICS & GYNECOLOGY

## 2024-12-10 NOTE — PROGRESS NOTES
Name: aMry Ashby      : 1960      MRN: [id number]  Encounter Provider: Sheela Whitmore MD  Encounter Date: 12/10/2024   Encounter department: [facility]  :  Assessment & Plan  Encounter for annual routine gynecological examination  Pap/HPV not indicated  Mammogram ordered  Mammogram/MRI yearly  Colonoscopy current, yearly      Encourage healthy diet, exercise, Calcium 1200mg per day and at least 800 iu Vitamin D daily.        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 If polyps are found, high quality colonoscopy every 1-2 years with consideration of surgery if the polyp burden becomes unmanageable.       Breast Cancer Screening:  - The current NCCN guidelines state that there is not enough data yet to support increased breast cancer surveillance. However some experts recommend annual breast MRI beginning at age 30 along with annual mammography beginning at age 40.      Reference: Pola RP, Michelle M, Nima Tran RM, et al. NTHL1 Tumor Syndrome. 2020 2. In: Haim MP, Everette J, Wendy HUERTA, et al., editors. [employer]® [Internet]. [place] ([place]): [facility]; 6806-1710. Available from: https://www.ncbi.nlm.nih.gov/books/BYP653489/      Duodenal Cancer Screening:   - Baseline upper endoscopy (including complete visualization of the ampulla of Vater) beginning at age 30-35.  Repeat endoscopy at least every 5 years unless otherwise clinically indicated [see FAP-C for follow-up of duodenoscopic findings].       Endometrial Cancer Screening:   aMry already had a KATIE at age 31 following a diagnosis of uterine cancer              History of Present Illness   HPI  Mary Ashby is a 64 y.o.  "female who presents for a routine annual visit    Hysterectomy  Mammogram-24 MRI pending  Colonoscopy-no record. Per pt had 3 months ago out of network. Needs to go yearly 2024 [facility]    Former smoker  Social drinker  Currently not sexually active  Mother--breast cancer    No concerns/questions for today's visit        Review of Systems   Constitutional:  Negative for activity change, appetite change, chills, fatigue and fever.   HENT:  Negative for rhinorrhea, sneezing and sore throat.    Eyes:  Negative for visual disturbance.   Respiratory:  Negative for cough, shortness of breath and wheezing.    Cardiovascular:  Negative for chest pain, palpitations and leg swelling.   Gastrointestinal:  Negative for abdominal distention, constipation, diarrhea, nausea and vomiting.   Genitourinary:  Positive for urgency. Negative for difficulty urinating, dysuria, frequency, pelvic pain, vaginal bleeding, vaginal discharge and vaginal pain.   Neurological:  Negative for syncope and light-headedness.          Objective   /58 (BP Location: Left arm, Patient Position: Sitting, Cuff Size: Standard)   Ht 5' 2.25\" (1.581 m)   Wt 59.4 kg (131 lb)   LMP  (LMP Unknown)   BMI 23.77 kg/m²      Physical Exam  Constitutional:       General: She is not in acute distress.     Appearance: Normal appearance. She is well-developed. She is not diaphoretic.   Chest:   Breasts:     Breasts are symmetrical.      Right: No inverted nipple, mass, nipple discharge, skin change or tenderness.      Left: No inverted nipple, mass, nipple discharge, skin change or tenderness.   Abdominal:      Palpations: Abdomen is soft. Abdomen is not rigid.      Tenderness: There is no abdominal tenderness. There is no guarding or rebound.      Hernia: There is no hernia in the left inguinal area or right inguinal area.   Genitourinary:     Pubic Area: No rash.       Labia:         Right: No rash, tenderness, lesion or injury.         Left: No " rash, tenderness, lesion or injury.       Urethra: No prolapse, urethral pain, urethral swelling or urethral lesion.      Vagina: No vaginal discharge, erythema, tenderness, bleeding or prolapsed vaginal walls.      Adnexa:         Right: No mass, tenderness or fullness.          Left: No mass, tenderness or fullness.        Comments: Urethral meatus: no lesions, non tender  Urethra: non tender    Vaginal mucosa atrophic  Skin:     General: Skin is warm and dry.      Coloration: Skin is not pale.      Findings: No erythema or rash.

## 2024-12-10 NOTE — ASSESSMENT & PLAN NOTE
Colon Cancer Screening:  - Begin high-quality colonoscopy at age 25-30 and repeat every 2-3 years if negative. If polyps are found, high quality colonoscopy every 1-2 years with consideration of surgery if the polyp burden becomes unmanageable.       Breast Cancer Screening:  - The current NCCN guidelines state that there is not enough data yet to support increased breast cancer surveillance. However some experts recommend annual breast MRI beginning at age 30 along with annual mammography beginning at age 40.      Reference: Pola RP, Martinez M, De Gabrieler RM, et al. NTHL1 Tumor Syndrome. 2020 Apr 2. In: Haim MP, Everette J, Wendy HUERTA, et al., editors. [employer]® [Internet]. [place] ([place]): [facility] [place]; 3974-4703. Available from: https://www.ncbi.nlm.nih.gov/books/HZQ962083/      Duodenal Cancer Screening:   - Baseline upper endoscopy (including complete visualization of the ampulla of Vater) beginning at age 30-35.  Repeat endoscopy at least every 5 years unless otherwise clinically indicated [see FAP-C for follow-up of duodenoscopic findings].       Endometrial Cancer Screening:   Mary already had a KATIE at age 31 following a diagnosis of uterine cancer

## 2024-12-13 DIAGNOSIS — I10 PRIMARY HYPERTENSION: ICD-10-CM

## 2024-12-13 RX ORDER — AMLODIPINE BESYLATE 5 MG/1
5 TABLET ORAL DAILY
Qty: 30 TABLET | Refills: 5 | Status: SHIPPED | OUTPATIENT
Start: 2024-12-13

## 2024-12-13 NOTE — TELEPHONE ENCOUNTER
Reason for call:   [x] Refill   [] Prior Auth  [] Other:     Office:   [x] PCP/Provider - : Sinan Palacios PA-C   [] Specialty/Provider -     Medication:  amLODIPine (NORVASC) 5 mg tablet    Dose/Frequency:  take 1 tablet by mouth once daily     Quantity: 30    Pharmacy: [id number] - [place] - [facility] [address]      Does the patient have enough for 3 days?   [x] Yes   [] No - Send as HP to POD

## 2024-12-16 DIAGNOSIS — F41.9 ANXIETY: ICD-10-CM

## 2024-12-17 ENCOUNTER — RESULTS FOLLOW-UP (OUTPATIENT)
Dept: FAMILY MEDICINE CLINIC | Facility: CLINIC | Age: 64
End: 2024-12-17

## 2024-12-17 ENCOUNTER — HOSPITAL ENCOUNTER (OUTPATIENT)
Dept: MRI IMAGING | Facility: HOSPITAL | Age: 64
Discharge: HOME | End: 2024-12-17
Payer: COMMERCIAL

## 2024-12-17 DIAGNOSIS — Z15.89 GENE MUTATION: ICD-10-CM

## 2024-12-17 PROCEDURE — C8937 CAD BREAST MRI: HCPCS

## 2024-12-17 PROCEDURE — A9585 GADOBUTROL INJECTION: HCPCS | Performed by: PHYSICIAN ASSISTANT

## 2024-12-17 PROCEDURE — C8908 MRI W/O FOL W/CONT, BREAST,: HCPCS

## 2024-12-17 RX ORDER — GADOBUTROL 604.72 MG/ML
6 INJECTION INTRAVENOUS
Status: COMPLETED | OUTPATIENT
Start: 2024-12-17 | End: 2024-12-17

## 2024-12-17 RX ADMIN — GADOBUTROL 6 ML: 604.72 INJECTION INTRAVENOUS at 10:48

## 2024-12-18 ENCOUNTER — OFFICE VISIT (OUTPATIENT)
Dept: HEMATOLOGY ONCOLOGY | Facility: CLINIC | Age: 64
End: 2024-12-18
Payer: COMMERCIAL

## 2024-12-18 VITALS
WEIGHT: 132 LBS | TEMPERATURE: 98 F | HEART RATE: 63 BPM | RESPIRATION RATE: 18 BRPM | HEIGHT: 62 IN | OXYGEN SATURATION: 98 % | SYSTOLIC BLOOD PRESSURE: 122 MMHG | DIASTOLIC BLOOD PRESSURE: 70 MMHG | BODY MASS INDEX: 24.29 KG/M2

## 2024-12-18 DIAGNOSIS — R91.8 LUNG NODULES: ICD-10-CM

## 2024-12-18 DIAGNOSIS — M89.9 LESION OF LUMBAR SPINE: ICD-10-CM

## 2024-12-18 DIAGNOSIS — D32.9 MENINGIOMA (HCC): ICD-10-CM

## 2024-12-18 DIAGNOSIS — E83.19 IRON OVERLOAD: Primary | ICD-10-CM

## 2024-12-18 PROCEDURE — 99214 OFFICE O/P EST MOD 30 MIN: CPT | Performed by: INTERNAL MEDICINE

## 2024-12-18 NOTE — PROGRESS NOTES
Hematology/Oncology Outpatient Follow-up  Mary Ashby 64 y.o. female 1960 [id number]    Date:  12/18/2024        Assessment and Plan:  1. 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  She is already on phlebotomy every 4 weeks.  Her recent iron panel continues to show elevated ferritin and saturation.  The patient stated that we will continue her on the phlebotomy until her ferritin level is around 50.    2. Lung nodules  Recent follow-up CT scan of the chest on 11/25/2024 continues to show multiple small pulmonary nodules measuring 0.4 cm and smaller which is unchanged since January of this year.  We did discuss repeating the CT scan again in about 6 months from now for follow-up.    3. Meningioma (HCC)  Status post resection of the left sphenoid wing to brain mass which showed meningioma, CNS WHO grade 1.  The patient was asked to follow-up with the neurosurgical team who is planning to pursue frequent brain MRI to rule out any hint of recurrence of the meningioma.     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     This rare mutation is also well-known to cause colorectal malignancy and less frequently breast cancer and skin cancer.  We had extensive discussion about the mutation and the risk of different malignancy.  She will need annual upper and lower endoscopy for close monitoring.  She will also need to be seen by the dermatologist annually.    4. Lesion of lumbar spine  She had repeated MRI of the spine on 11/25/2024 which showed stable multiple poorly circumscribed soft tissue masses within the posterior paraspinous musculature which is possibly compatible with hemangiomas.    We discussed the other findings on the lumbar spine.  I think would be appropriate to repeat the lumbar spine again in 6  months from now.  The patient stated that she is in the process of changing her health insurance which is dictating her to switch her providers to [facility].  I did ask her to follow-up with her PCP to arrange for smooth transition.  Otherwise, we would be more than happy to see her again in the near future and order all the above-mentioned imaging.        HPI:  This is a 64-year-old female with a remote history of endometrial cancer status post hysterectomy in 1991, hypertension, thyroid disease, etc.  She stated that her family history is full of malignancy including breast cancer in her mother, maternal aunt and cousin.  Colon cancer parental uncle had melanoma in her brother.  The patient has a history of multiple benign colon polyps which requires colonoscopy on every other year basis.  The patient apparently complained about neurological symptoms including difficulties with her memory and finding words.  This resulted in an MRI of the brain which was done on 1/23/2024 and showed:    IMPRESSION:     4.3 cm probable meningioma in anterior aspect of left middle cranial fossa with small dural feeder vessels, diffuse surrounding vasogenic edema (left frontal, left subinsular, left posterior limb of internal capsule, left parietal, and left temporal   lobes), compressive mass effect on adjacent left temporal lobe and left lateral ventricle, 0.7 cm rightward midline shift, left uncal herniation, and rightward shift of upper brainstem. Recommend neurosurgical consultation for further evaluation.     NeuroQuant analysis was performed:   Normal study however this is confounded by a left middle cranial fossa mass and the results of this study are likely inconclusive but overall does not support neurodegeneration.     The patient was then immediately admitted to the hospital for further evaluation.  She did have CTA of the head which showed the large dural based extra-axial mass measuring 3.6 cm with surrounding vasogenic  edema.     CT chest abdomen pelvis on 1/24/2024 showed:  IMPRESSION:     1.  No evidence of a primary malignancy in the chest, abdomen, or pelvis.  2.  Multiple bilateral pulmonary nodules measuring 4 mm or less are indeterminate in the absence of comparison imaging. Given ongoing malignancy work-up, recommend follow-up chest CT in 3 months to exclude metastatic disease.  3.  Multiple hypoattenuating hepatic lesions similarly are indeterminate in the absence of comparison imaging. The largest of these, approximately 1 cm, measure slightly above fluid density. In light of ongoing malignancy work-up, recommend further   characterization with contrast-enhanced abdominal MRI.  4.  Multiple ill-defined foci of enhancement in the lumbar paraspinal musculature, at least 1 of these measuring 1.6 cm does appear somewhat rounded and masslike. Metastatic disease cannot be excluded. The more superior of these lesions should be   included in the field-of-view on the recommended abdominal MRI which may aid in further characterization. PET/CT may also be considered for further assessment.     The patient then had MRI of the abdomen for further evaluation of the hepatic cysts on 1/25/2024 which showed:  IMPRESSION:     No findings suspicious for malignancy in the abdomen.     Tiny hepatic cysts, considered benign findings. Evidence of hepatic iron deposition. Correlate for prior blood transfusions and hemochromatosis.     Findings most compatible with gallbladder cholesterolosis/tiny polyps, typically benign. Recommend confirmation with right upper quadrant ultrasound.     At the same day she also had an MRI of the lumbar spine for further evaluation of the lumbar paraspinal intramuscular lesions which showed:     IMPRESSION:     Paraspinal intramuscular masses. Imaging morphology most compatible with hemangiomas. Myxoma, sarcoma and metastases are considered less likely.     No evidence of bone or intracanalicular neoplasm.      Minimal degenerative disc disease. L4-L5 facet osteoarthritis with minimal degenerative anterolisthesis. No stenosis.     She then had resection of the left sphenoid wing brain mass on 1/30/2024.  The pathology came back compatible with meningioma, CNS WHO grade 1.     She also had an ultrasound of the abdomen for further evaluation of the gallbladder polyps on 2/28/2024 which showed:  IMPRESSION:     Multiple gallbladder polyps measuring up to 6 mm. According to current consensus recommendations (SRU 2022; 000:1-12), for polyps of this size ( <=  6 mm) which have a low risk morphology, no follow-up is recommended.         Interval history:  The patient came today for a follow-up visit accompanied by her daughter.  She denied significant changes of her overall condition.  She did have multiple imaging including CT scan of the chest on 11/25/2024 which showed  IMPRESSION:     Multiple small pulmonary nodules, measuring 0.4 cm and smaller in size, unchanged from 5/20/2024, 1/24/2024. Based on current Fleischner Society 2017 Guidelines on incidental pulmonary nodule, patients with a known malignancy are at increased risk of   metastasis and should receive follow-up CT at intervals appropriate for the type of cancer and its risk of pulmonary metastases.      She also had a lumbar spine MRI on 11/25/2024 which showed:  IMPRESSION:  No significant interval change evident in multiple poorly circumscribed soft tissue masses within the posterior paraspinous musculature when compared to the oldest MRI I have available MRI lumbar spine 1/25/2024. Leading differential   consideration are hemangiomas. Recommend ultrasound and consideration of ultrasound-guided biopsy for further assessment.     Multiple small hepatic masses most likely to represent benign cysts. There was a recommendation for MRI liver hepatic mass protocol on the report from the 1/24/2024 CT chest abdomen and pelvis. Please refer to the report from that  examination.     Mild spondylosis without neural encroachment. Old Schmorl's node and anterior wedging vertebral body deformities.     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  I recommend correlation of this examination to plain x-rays of the spine prior to any surgical intervention in order to assure the appropriate level of   intervention.  Bilateral MRI of the breast came back negative.  Blood work on 12-24 showed white cell count of 3.6 with normal hemoglobin hematocrit and platelet count.  White cell differential was normal.  CMP was entirely normal.  Iron panel showed saturation of 60% and ferritin 487.    ROS: Review of Systems   Constitutional:  Positive for fatigue. Negative for chills and fever.   HENT:  Positive for mouth sores. Negative for ear pain and sore throat.    Eyes:  Negative for pain and visual disturbance.   Respiratory:  Negative for cough and shortness of breath.    Cardiovascular:  Negative for chest pain and palpitations.   Gastrointestinal:  Positive for constipation. Negative for abdominal pain and vomiting.   Genitourinary:  Negative for dysuria and hematuria.   Musculoskeletal:  Negative for arthralgias and back pain.   Skin:  Negative for color change and rash.   Neurological:  Positive for headaches. Negative for seizures and syncope.   All other systems reviewed and are negative.      Past Medical History:   Diagnosis Date    Arthritis     BRCA1 negative     Cancer (HCC) 1991    Uterus    Disease of thyroid gland     Endometrial cancer (HCC) 1991    Hypertension January 3 2023    Hypothyroidism 1987    Thyroid disease     Uterus cancer (HCC)        Past Surgical History:   Procedure Laterality Date    CRANIOTOMY Left 1/30/2024    Procedure: IMAGE GUIDED LEFT PTERIONAL CRANIOTOMY FOR TUMOR;  Surgeon: Placido Lowery MD;  [facility]: [facility];  Service: Neurosurgery    HYSTERECTOMY      still has ovaries    IR CEREBRAL  ANGIOGRAPHY / INTERVENTION  2024       Social History     Socioeconomic History    Marital status:      Spouse name: None    Number of children: None    Years of education: None    Highest education level: None   Occupational History    None   Tobacco Use    Smoking status: Former     Current packs/day: 0.00     Average packs/day: 0.3 packs/day for 5.0 years (1.3 ttl pk-yrs)     Types: Cigarettes     Quit date: 3/3/1984     Years since quittin.8    Smokeless tobacco: Never   Vaping Use    Vaping status: Never Used   Substance and Sexual Activity    Alcohol use: Yes     Alcohol/week: 3.0 standard drinks of alcohol     Types: 3 Glasses of wine per week     Comment: social    Drug use: No    Sexual activity: Not Currently     Partners: Male     Birth control/protection: Surgical     Comment: Hysterectomy    Other Topics Concern    None   Social History Narrative    None     Social Drivers of Health     Financial Resource Strain: Not on file   Food Insecurity: No Food Insecurity (2024)    Nursing - Inadequate Food Risk Classification     Worried About Running Out of Food in the Last Year: Never true     Ran Out of Food in the Last Year: Never true     Ran Out of Food in the Last Year: Not on file   Transportation Needs: No Transportation Needs (2024)    PRAPARE - Transportation     Lack of Transportation (Medical): No     Lack of Transportation (Non-Medical): No   Physical Activity: Not on file   Stress: Not on file   Social Connections: Unknown (2024)    Received from [facility]    Social N(i)Â²     How often do you feel lonely or isolated from those around you? (Adult - for ages 18 years and over): Not on file   Intimate Partner Violence: Not on file   Housing Stability: Low Risk  (2024)    Housing Stability Vital Sign     Unable to Pay for Housing in the Last Year: No     Number of Times Moved in the Last Year: 1     Homeless in the Last Year: No       Family History  "  Problem Relation Age of Onset    Breast cancer Mother 50    No Known Problems Father     Cancer Sister     No Known Problems Daughter     No Known Problems Maternal Grandmother     No Known Problems Maternal Grandfather     No Known Problems Paternal Grandmother     No Known Problems Paternal Grandfather     Lymphoma Brother     Melanoma Brother 50    No Known Problems Son     Skin cancer Maternal Aunt     Breast cancer Maternal Aunt     Colon cancer Paternal Uncle     Breast cancer Cousin        No Known Allergies      Current Outpatient Medications:     amLODIPine (NORVASC) 5 mg tablet, Take 1 tablet (5 mg total) by mouth daily, Disp: 30 tablet, Rfl: 5    FLUoxetine (PROzac) 20 mg capsule, TAKE 1 CAPSULE (20 MG TOTAL) BY MOUTH DAILY, Disp: 90 capsule, Rfl: 1    levothyroxine 75 mcg tablet, TAKE 1 TABLET (75 MCG TOTAL) BY MOUTH DAILY, Disp: 90 tablet, Rfl: 1    omeprazole (PriLOSEC) 40 MG capsule, Take 40 mg by mouth 2 (two) times a day, Disp: , Rfl:     brompheniramine-pseudoephedrine-DM 30-2-10 MG/5ML syrup, Take 5 mL by mouth 3 (three) times a day as needed for allergies or cough (Patient not taking: Reported on 12/18/2024), Disp: 120 mL, Rfl: 0      Physical Exam:  /70 (BP Location: Right arm, Patient Position: Sitting, Cuff Size: Adult)   Pulse 63   Temp 98 °F (36.7 °C)   Resp 18   Ht 5' 2\" (1.575 m)   Wt 59.9 kg (132 lb)   LMP  (LMP Unknown)   SpO2 98%   BMI 24.14 kg/m²     Physical Exam  Constitutional:       General: She is not in acute distress.     Appearance: She is well-developed. She is not diaphoretic.   HENT:      Head: Normocephalic and atraumatic.      Nose: Nose normal.   Eyes:      General: No scleral icterus.        Right eye: No discharge.         Left eye: No discharge.      Conjunctiva/sclera: Conjunctivae normal.      Pupils: Pupils are equal, round, and reactive to light.   Neck:      Thyroid: No thyromegaly.      Vascular: No JVD.      Trachea: No tracheal deviation. "   Cardiovascular:      Rate and Rhythm: Normal rate and regular rhythm.      Heart sounds: Normal heart sounds. No murmur heard.     No friction rub.   Pulmonary:      Effort: Pulmonary effort is normal. No respiratory distress.      Breath sounds: Normal breath sounds. No stridor. No wheezing or rales.   Chest:      Chest wall: No tenderness.   Abdominal:      General: There is no distension.      Palpations: Abdomen is soft. There is no hepatomegaly or splenomegaly.      Tenderness: There is no abdominal tenderness. There is no guarding or rebound.   Musculoskeletal:         General: No tenderness or deformity. Normal range of motion.      Cervical back: Normal range of motion and neck supple.   Lymphadenopathy:      Cervical: No cervical adenopathy.   Skin:     General: Skin is warm and dry.      Coloration: Skin is not pale.      Findings: No erythema or rash.   Neurological:      Mental Status: She is alert and oriented to person, place, and time.      Cranial Nerves: No cranial nerve deficit.      Coordination: Coordination normal.      Deep Tendon Reflexes: Reflexes are normal and symmetric.   Psychiatric:         Behavior: Behavior normal.         Thought Content: Thought content normal.         Judgment: Judgment normal.           Labs:  Lab Results   Component Value Date    WBC 3.67 (L) 12/02/2024    HGB 14.1 12/02/2024    HCT 42.9 12/02/2024    MCV 91 12/02/2024     12/02/2024     Lab Results   Component Value Date    K 4.4 12/02/2024     12/02/2024    CO2 30 12/02/2024    BUN 13 12/02/2024    CREATININE 0.62 12/02/2024    GLUF 75 12/02/2024    CALCIUM 9.1 12/02/2024    AST 17 12/02/2024    ALT 13 12/02/2024    ALKPHOS 86 12/02/2024    EGFR 96 12/02/2024     Lab Results   Component Value Date    TSH 2.14 04/22/2019       Patient voiced understanding and agreement in the above discussion. Aware to contact our office with questions/symptoms in the interim.

## 2024-12-30 ENCOUNTER — APPOINTMENT (OUTPATIENT)
Dept: LAB | Facility: CLINIC | Age: 64
End: 2024-12-30
Payer: COMMERCIAL

## 2024-12-30 DIAGNOSIS — E83.19 IRON OVERLOAD: ICD-10-CM

## 2024-12-30 LAB
ALBUMIN SERPL BCG-MCNC: 4.4 G/DL (ref 3.5–5)
ALP SERPL-CCNC: 88 U/L (ref 34–104)
ALT SERPL W P-5'-P-CCNC: 18 U/L (ref 7–52)
ANION GAP SERPL CALCULATED.3IONS-SCNC: 6 MMOL/L (ref 4–13)
AST SERPL W P-5'-P-CCNC: 19 U/L (ref 13–39)
BASOPHILS # BLD AUTO: 0.01 THOUSANDS/ΜL (ref 0–0.1)
BASOPHILS NFR BLD AUTO: 0 % (ref 0–1)
BILIRUB SERPL-MCNC: 0.84 MG/DL (ref 0.2–1)
BUN SERPL-MCNC: 18 MG/DL (ref 5–25)
CALCIUM SERPL-MCNC: 9.6 MG/DL (ref 8.4–10.2)
CHLORIDE SERPL-SCNC: 101 MMOL/L (ref 96–108)
CO2 SERPL-SCNC: 32 MMOL/L (ref 21–32)
CREAT SERPL-MCNC: 0.65 MG/DL (ref 0.6–1.3)
EOSINOPHIL # BLD AUTO: 0.05 THOUSAND/ΜL (ref 0–0.61)
EOSINOPHIL NFR BLD AUTO: 1 % (ref 0–6)
ERYTHROCYTE [DISTWIDTH] IN BLOOD BY AUTOMATED COUNT: 12.1 % (ref 11.6–15.1)
FERRITIN SERPL-MCNC: 464 NG/ML (ref 11–307)
GFR SERPL CREATININE-BSD FRML MDRD: 95 ML/MIN/1.73SQ M
GLUCOSE SERPL-MCNC: 58 MG/DL (ref 65–140)
HCT VFR BLD AUTO: 41.7 % (ref 34.8–46.1)
HGB BLD-MCNC: 13.7 G/DL (ref 11.5–15.4)
IMM GRANULOCYTES # BLD AUTO: 0.02 THOUSAND/UL (ref 0–0.2)
IMM GRANULOCYTES NFR BLD AUTO: 1 % (ref 0–2)
IRON SATN MFR SERPL: 71 % (ref 15–50)
IRON SERPL-MCNC: 183 UG/DL (ref 50–212)
LYMPHOCYTES # BLD AUTO: 1.05 THOUSANDS/ΜL (ref 0.6–4.47)
LYMPHOCYTES NFR BLD AUTO: 28 % (ref 14–44)
MCH RBC QN AUTO: 29.9 PG (ref 26.8–34.3)
MCHC RBC AUTO-ENTMCNC: 32.9 G/DL (ref 31.4–37.4)
MCV RBC AUTO: 91 FL (ref 82–98)
MONOCYTES # BLD AUTO: 0.37 THOUSAND/ΜL (ref 0.17–1.22)
MONOCYTES NFR BLD AUTO: 10 % (ref 4–12)
NEUTROPHILS # BLD AUTO: 2.21 THOUSANDS/ΜL (ref 1.85–7.62)
NEUTS SEG NFR BLD AUTO: 60 % (ref 43–75)
NRBC BLD AUTO-RTO: 0 /100 WBCS
PLATELET # BLD AUTO: 176 THOUSANDS/UL (ref 149–390)
PMV BLD AUTO: 11 FL (ref 8.9–12.7)
POTASSIUM SERPL-SCNC: 4.3 MMOL/L (ref 3.5–5.3)
PROT SERPL-MCNC: 6.6 G/DL (ref 6.4–8.4)
RBC # BLD AUTO: 4.58 MILLION/UL (ref 3.81–5.12)
SODIUM SERPL-SCNC: 139 MMOL/L (ref 135–147)
TIBC SERPL-MCNC: 256.2 UG/DL (ref 250–450)
TRANSFERRIN SERPL-MCNC: 183 MG/DL (ref 203–362)
UIBC SERPL-MCNC: 73 UG/DL (ref 155–355)
WBC # BLD AUTO: 3.71 THOUSAND/UL (ref 4.31–10.16)

## 2024-12-30 PROCEDURE — 82728 ASSAY OF FERRITIN: CPT

## 2024-12-30 PROCEDURE — 85025 COMPLETE CBC W/AUTO DIFF WBC: CPT

## 2024-12-30 PROCEDURE — 80053 COMPREHEN METABOLIC PANEL: CPT

## 2024-12-30 PROCEDURE — 83540 ASSAY OF IRON: CPT

## 2024-12-30 PROCEDURE — 36415 COLL VENOUS BLD VENIPUNCTURE: CPT

## 2024-12-30 PROCEDURE — 83550 IRON BINDING TEST: CPT

## 2025-01-03 ENCOUNTER — HOSPITAL ENCOUNTER (OUTPATIENT)
Dept: INFUSION CENTER | Facility: HOSPITAL | Age: 65
End: 2025-01-03
Attending: INTERNAL MEDICINE
Payer: COMMERCIAL

## 2025-01-03 VITALS
OXYGEN SATURATION: 99 % | HEART RATE: 61 BPM | DIASTOLIC BLOOD PRESSURE: 87 MMHG | TEMPERATURE: 97.7 F | SYSTOLIC BLOOD PRESSURE: 116 MMHG | RESPIRATION RATE: 16 BRPM

## 2025-01-03 DIAGNOSIS — E83.19 IRON OVERLOAD: Primary | ICD-10-CM

## 2025-01-03 PROCEDURE — 96360 HYDRATION IV INFUSION INIT: CPT

## 2025-01-03 PROCEDURE — 96361 HYDRATE IV INFUSION ADD-ON: CPT

## 2025-01-03 PROCEDURE — 99195 PHLEBOTOMY: CPT

## 2025-01-03 RX ADMIN — SODIUM CHLORIDE 500 ML: 0.9 INJECTION, SOLUTION INTRAVENOUS at 11:15

## 2025-01-03 RX ADMIN — SODIUM CHLORIDE 1000 ML: 0.9 INJECTION, SOLUTION INTRAVENOUS at 09:48

## 2025-01-03 NOTE — PROGRESS NOTES
NSS 1000 ml given per orders.  Therapeutic phlebotomy parameters reviewed with Shantell Rachel RN Phlebotomy done using Left antecubital vein.  Start time: 1106.  End time: 1115.   400 ml removed per orders using Katherine scale. Pressure dressing applied. Patient reported feeling dizzy like she was going to pass out.  500 ml NSS started.  Patient's chair laid flat. BP 78/42 HR 52.  Denies chest pain or SOB. Snack and drink given to patient.  Vitals monitored frequently.  See flowsheet.  Patient reports feeling better. Rin Mcdowell RN notified of above.  Patient is ok for discharge since she is stable now.  Vital signs stable at discharge.  Patient denies dizziness or lightheadedness at time of discharge.      Mary Ashby is aware of future appt on 1/31/25 at 1000.     AVS -  No (Declined by Mary Ashby) Patient has Mychart.    Patient ambulated off unit without incident.  All personal belongings taken with patient.

## 2025-01-14 ENCOUNTER — TELEPHONE (OUTPATIENT)
Dept: NEUROSURGERY | Facility: CLINIC | Age: 65
End: 2025-01-14

## 2025-01-14 NOTE — TELEPHONE ENCOUNTER
Call received from patient stating her insurance changed and it is now cheaper for her to have her MRI done at [facility]. She requested this RN mail her the MRI script. This RN placed in mail bin.     Advised patient to call the office once the MRI is completed so we can reach out to [facility] and have the images pushed through.     She stated an understanding and was appreciative of the call.

## 2025-01-22 ENCOUNTER — HOSPITAL ENCOUNTER (OUTPATIENT)
Dept: MAMMOGRAPHY | Facility: HOSPITAL | Age: 65
Discharge: HOME | End: 2025-01-22
Payer: COMMERCIAL

## 2025-01-22 VITALS — WEIGHT: 132.06 LBS | BODY MASS INDEX: 24.3 KG/M2 | HEIGHT: 62 IN

## 2025-01-22 DIAGNOSIS — Z12.31 ENCOUNTER FOR SCREENING MAMMOGRAM FOR BREAST CANCER: ICD-10-CM

## 2025-01-22 PROCEDURE — 77067 SCR MAMMO BI INCL CAD: CPT

## 2025-01-22 PROCEDURE — 77063 BREAST TOMOSYNTHESIS BI: CPT

## 2025-01-23 ENCOUNTER — TELEPHONE (OUTPATIENT)
Age: 65
End: 2025-01-23

## 2025-01-23 NOTE — TELEPHONE ENCOUNTER
Devan called from [facility] for fax of MRI order since pt has MRI scheduled on 2/25/25.  Order faxed through [employer]

## 2025-01-31 ENCOUNTER — HOSPITAL ENCOUNTER (OUTPATIENT)
Dept: INFUSION CENTER | Facility: HOSPITAL | Age: 65
Discharge: HOME | End: 2025-01-31
Attending: INTERNAL MEDICINE

## 2025-02-04 ENCOUNTER — OFFICE VISIT (OUTPATIENT)
Dept: FAMILY MEDICINE CLINIC | Facility: CLINIC | Age: 65
End: 2025-02-04
Payer: COMMERCIAL

## 2025-02-04 VITALS
BODY MASS INDEX: 24.73 KG/M2 | OXYGEN SATURATION: 92 % | HEART RATE: 79 BPM | HEIGHT: 62 IN | TEMPERATURE: 98.6 F | WEIGHT: 134.4 LBS | DIASTOLIC BLOOD PRESSURE: 72 MMHG | SYSTOLIC BLOOD PRESSURE: 132 MMHG

## 2025-02-04 DIAGNOSIS — Z91.89 AT RISK FOR GENETIC DISORDER: ICD-10-CM

## 2025-02-04 DIAGNOSIS — E03.9 ACQUIRED HYPOTHYROIDISM: ICD-10-CM

## 2025-02-04 DIAGNOSIS — I10 PRIMARY HYPERTENSION: Primary | ICD-10-CM

## 2025-02-04 DIAGNOSIS — Z13.220 SCREENING FOR LIPID DISORDERS: ICD-10-CM

## 2025-02-04 DIAGNOSIS — Z00.00 HEALTH MAINTENANCE EXAMINATION: ICD-10-CM

## 2025-02-04 DIAGNOSIS — D32.9 MENINGIOMA (HCC): ICD-10-CM

## 2025-02-04 DIAGNOSIS — E83.19 IRON OVERLOAD: ICD-10-CM

## 2025-02-04 PROCEDURE — 99396 PREV VISIT EST AGE 40-64: CPT | Performed by: PHYSICIAN ASSISTANT

## 2025-02-04 PROCEDURE — 99214 OFFICE O/P EST MOD 30 MIN: CPT | Performed by: PHYSICIAN ASSISTANT

## 2025-02-04 NOTE — PROGRESS NOTES
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 HM reviewed. Send colonoscopy report. Annual upper/lower endoscopy            History of Present Illness     Pt presents for follow up, annual physical     She follows with NS with hx of meningioma in setting of NTHL1 mutation. Routine imaging. No new neurologic complaints     Hx of iron overload, hemachromatosis, receives phlebotomy q3 weeks now with [facility]. Still experiencing some low BP during phlebotomy. She is on amlodipine for HTN. BP has been well controlled. No longer on ASA. She follows with hematology     Hematology/oncology routinely monitor lung nodules with CT and lumbar paraspinal lesions with MRI     Mood stable on prozac, no acute concerns     Will require annual upper/lower endoscopy. She had colonoscopy out of network in 2024.  Utd with mammography  Not smoking  Limiting alcohol  FH reviewed  Meds/allergies reviewed        Review of Systems   Constitutional:  Negative for chills, fatigue and fever.   HENT:  Negative for congestion, ear pain, hearing loss, nosebleeds, postnasal drip, rhinorrhea, sinus pressure, sinus pain, sneezing and sore throat.    Eyes:  Negative for pain, discharge, itching and visual disturbance.    Respiratory:  Negative for cough, chest tightness, shortness of breath and wheezing.    Cardiovascular:  Negative for chest pain, palpitations and leg swelling.   Gastrointestinal:  Negative for abdominal pain, blood in stool, constipation, diarrhea, nausea and vomiting.   Genitourinary:  Negative for frequency and urgency.   Neurological:  Negative for dizziness, light-headedness and numbness.   Psychiatric/Behavioral:  The patient is nervous/anxious (stable).      Past Medical History:   Diagnosis Date   • Arthritis    • BRCA1 negative    • Cancer (HCC)     Uterus   • Disease of thyroid gland    • Endometrial cancer (HCC)    • Hypertension January 3 2023   • Hypothyroidism    • Thyroid disease    • Uterus cancer (HCC)      Past Surgical History:   Procedure Laterality Date   • CRANIOTOMY Left 2024    Procedure: IMAGE GUIDED LEFT PTERIONAL CRANIOTOMY FOR TUMOR;  Surgeon: Placido Lowery MD;  Location: [facility];  Service: Neurosurgery   • HYSTERECTOMY      still has ovaries   • IR CEREBRAL ANGIOGRAPHY / INTERVENTION  2024     Family History   Problem Relation Age of Onset   • Breast cancer Mother 50   • No Known Problems Father    • Gallbladder disease Sister    • No Known Problems Daughter    • No Known Problems Maternal Grandmother    • No Known Problems Maternal Grandfather    • No Known Problems Paternal Grandmother    • No Known Problems Paternal Grandfather    • Lymphoma Brother    • Melanoma Brother 50   • No Known Problems Son    • Skin cancer Maternal Aunt    • Breast cancer Maternal Aunt 68   • Colon cancer Paternal Uncle    • Breast cancer Cousin 45     Social History     Tobacco Use   • Smoking status: Former     Current packs/day: 0.00     Average packs/day: 0.3 packs/day for 5.0 years (1.3 ttl pk-yrs)     Types: Cigarettes     Quit date: 3/3/1984     Years since quittin.9   • Smokeless tobacco: Never   Vaping Use   • Vaping status: Never Used   Substance and Sexual  "Activity   • Alcohol use: Yes     Alcohol/week: 3.0 standard drinks of alcohol     Types: 3 Glasses of wine per week     Comment: social   • Drug use: No   • Sexual activity: Not Currently     Partners: Male     Birth control/protection: Surgical     Comment: Hysterectomy 1991     Current Outpatient Medications on File Prior to Visit   Medication Sig   • amLODIPine (NORVASC) 5 mg tablet Take 1 tablet (5 mg total) by mouth daily   • FLUoxetine (PROzac) 20 mg capsule TAKE 1 CAPSULE (20 MG TOTAL) BY MOUTH DAILY   • levothyroxine 75 mcg tablet TAKE 1 TABLET (75 MCG TOTAL) BY MOUTH DAILY   • omeprazole (PriLOSEC) 40 MG capsule Take 40 mg by mouth 2 (two) times a day   • [DISCONTINUED] brompheniramine-pseudoephedrine-DM 30-2-10 MG/5ML syrup Take 5 mL by mouth 3 (three) times a day as needed for allergies or cough (Patient not taking: Reported on 12/18/2024)     No Known Allergies  Immunization History   Administered Date(s) Administered   • COVID-19 PFIZER VACCINE 0.3 ML IM 05/19/2021, 06/09/2021, 01/11/2022   • COVID-19 Pfizer Vac BIVALENT Augusto-sucrose 12 Yr+ IM 10/18/2022     Objective   /72   Pulse 79   Temp 98.6 °F (37 °C)   Ht 5' 2\" (1.575 m)   Wt 61 kg (134 lb 6.4 oz)   LMP  (LMP Unknown)   SpO2 92%   BMI 24.58 kg/m²     Physical Exam  Vitals and nursing note reviewed.   Constitutional:       General: She is not in acute distress.     Appearance: She is well-developed.   HENT:      Head: Normocephalic and atraumatic.   Eyes:      Conjunctiva/sclera: Conjunctivae normal.   Cardiovascular:      Rate and Rhythm: Normal rate and regular rhythm.      Heart sounds: No murmur heard.  Pulmonary:      Effort: Pulmonary effort is normal. No respiratory distress.      Breath sounds: Normal breath sounds. No wheezing, rhonchi or rales.   Musculoskeletal:         General: No swelling.      Cervical back: Neck supple.   Skin:     General: Skin is warm and dry.      Capillary Refill: Capillary refill takes less than 2 " seconds.   Neurological:      Mental Status: She is alert.   Psychiatric:         Mood and Affect: Mood normal.

## 2025-02-13 ENCOUNTER — TELEPHONE (OUTPATIENT)
Age: 65
End: 2025-02-13

## 2025-02-13 NOTE — TELEPHONE ENCOUNTER
[facility] called to request that we Pre auth this Brain MRI that is scheduled for 2/25/25 at [facility].  This MRI was ordered by Dr Lowery, can you assist us please?  Thank you

## 2025-02-28 ENCOUNTER — RESULTS FOLLOW-UP (OUTPATIENT)
Dept: FAMILY MEDICINE CLINIC | Facility: CLINIC | Age: 65
End: 2025-02-28

## 2025-02-28 LAB
CHOLEST SERPL-MCNC: 219 MG/DL
CHOLEST/HDLC SERPL: 3.5 {RATIO}
HDLC SERPL-MCNC: 63 MG/DL (ref 23–92)
LDLC SERPL CALC-MCNC: 137 MG/DL
NONHDLC SERPL-MCNC: 156 MG/DL
TRIGL SERPL-MCNC: 94 MG/DL
TSH SERPL-ACNC: 0.53 UIU/ML (ref 0.45–5.33)

## 2025-03-05 ENCOUNTER — OFFICE VISIT (OUTPATIENT)
Dept: NEUROSURGERY | Facility: CLINIC | Age: 65
End: 2025-03-05
Payer: COMMERCIAL

## 2025-03-05 VITALS
TEMPERATURE: 98 F | HEIGHT: 62 IN | HEART RATE: 67 BPM | OXYGEN SATURATION: 98 % | BODY MASS INDEX: 24.66 KG/M2 | DIASTOLIC BLOOD PRESSURE: 68 MMHG | RESPIRATION RATE: 14 BRPM | SYSTOLIC BLOOD PRESSURE: 124 MMHG | WEIGHT: 134 LBS

## 2025-03-05 DIAGNOSIS — D32.9 MENINGIOMA (HCC): Primary | ICD-10-CM

## 2025-03-05 PROCEDURE — 99214 OFFICE O/P EST MOD 30 MIN: CPT | Performed by: NEUROLOGICAL SURGERY

## 2025-03-05 NOTE — PROGRESS NOTES
Name: Mary Ashby      : 1960      MRN: [id number]  Encounter Provider: Placido Lowery MD  Encounter Date: 3/5/2025   Encounter department: [facility]  :  Assessment & Plan  Meningioma (HCC)  WHO grade 1 left sphenoid wing meningioma status post resection 2024  She is now approximately 1 year status post resection of her WHO grade 1 meningioma.  She has been doing very well.  MRI was done at [facility], but review demonstrates no significant evidence of residual recurrent tumor.  This is in line with outside hospital read.    At this juncture I like to transition her to annual follow-up in accordance with NCCN guidelines.  Will repeat MRI in 1 year.  Orders:    MRI brain with and without contrast; Future        History of Present Illness     This is a very pleasant 64-year-old female who presented with speech difficulties and was found to have a large left sphenoid wing meningioma after obtaining an outpatient imaging study.  She ultimately underwent embolization and resection on 2024.     She is now 1 year status post resection.  She has been doing very well.  She has no new neurologic complaints. She feels mostly back to baseline.     Her past medical history is significant for hypertension.     She is .  She is a W[employer] and has outlined her previous wishes regarding blood transfusion if necessary.  She has one daughter age 36 and one son who is 38.  She is not currently employed.  She denies tobacco and illicit drug use.  She has not had any alcohol since surgery.  She denies any Neurologic family history.    Review of Systems   Constitutional:  Positive for fatigue.   HENT:  Negative for tinnitus.    Eyes:  Negative for visual disturbance.   Gastrointestinal: Negative.    Genitourinary: Negative.    Musculoskeletal:  Negative for gait problem.   Neurological:  Negative for dizziness, tremors, seizures, syncope, speech  difficulty, weakness, numbness and headaches.   Hematological:  Does not bruise/bleed easily.   Psychiatric/Behavioral:  Negative for confusion, decreased concentration and sleep disturbance.      I have personally reviewed the MA's review of systems and made changes as necessary.    Past Medical History   Past Medical History:   Diagnosis Date    Arthritis     BRCA1 negative     Cancer (HCC) 1991    Uterus    Disease of thyroid gland     Endometrial cancer (HCC) 1991    Hypertension January 3 2023    Hypothyroidism 1987    Thyroid disease     Uterus cancer (HCC)      Past Surgical History:   Procedure Laterality Date    CRANIOTOMY Left 1/30/2024    Procedure: IMAGE GUIDED LEFT PTERIONAL CRANIOTOMY FOR TUMOR;  Surgeon: Placido Lowery MD;  Location: [facility];  Service: Neurosurgery    HYSTERECTOMY      still has ovaries    IR CEREBRAL ANGIOGRAPHY / INTERVENTION  1/29/2024     Family History   Problem Relation Age of Onset    Breast cancer Mother 50    No Known Problems Father     Gallbladder disease Sister     No Known Problems Daughter     No Known Problems Maternal Grandmother     No Known Problems Maternal Grandfather     No Known Problems Paternal Grandmother     No Known Problems Paternal Grandfather     Lymphoma Brother     Melanoma Brother 50    No Known Problems Son     Skin cancer Maternal Aunt     Breast cancer Maternal Aunt 68    Colon cancer Paternal Uncle     Breast cancer Cousin 45     she reports that she quit smoking about 41 years ago. Her smoking use included cigarettes. She has a 1.3 pack-year smoking history. She has never used smokeless tobacco. She reports current alcohol use of about 3.0 standard drinks of alcohol per week. She reports that she does not use drugs.  Current Outpatient Medications   Medication Instructions    amLODIPine (NORVASC) 5 mg, Oral, Daily    FLUoxetine (PROZAC) 20 mg, Oral, Daily    levothyroxine 75 mcg, Oral, Daily    omeprazole (PRILOSEC) 40 mg, 2 times daily   No  "Known Allergies   Objective   /68 (BP Location: Right arm, Patient Position: Sitting, Cuff Size: Standard)   Pulse 67   Temp 98 °F (36.7 °C) (Temporal)   Resp 14   Ht 5' 2\" (1.575 m)   Wt 60.8 kg (134 lb)   LMP  (LMP Unknown)   SpO2 98%   BMI 24.51 kg/m²     Physical Exam  Neurological Exam  She is well appearing.  Affect is appropriate. Body mass index is 24.51 kg/m².. She is awake alert and oriented.  Hearing and vision are grossly intact.   Her pupils are equal round reactive to light.  Her extraocular movements are intact.  Her face is symmetric.  Tongue is midline.  Facial sensation is intact and symmetric throughout.  Shoulder shrug is 5/5.  There is no drift or dysmetria.     She has full strength in her bilateral upper and lower extremities.  She has normal muscle tone muscle bulk.  Her biceps reflexes and patellar reflexes are 2+ and symmetric.  Marcia sign negative bilaterally.  Sensation intact to light touch and pinprick throughout.  Her gait is normal.     Her heart rate is regular.  Normal respiratory effort.  Abdomen nondistended.  Radial pulses 2+.     We reviewed her most recent MRI in detail as well as the report.  No evidence of residual or recurrent tumor.    Administrative Statements   I have spent a total time of 30 minutes in caring for this patient on the day of the visit/encounter including Diagnostic results, Prognosis, Risks and benefits of tx options, Instructions for management, Patient and family education, Importance of tx compliance, Risk factor reductions, Impressions, Counseling / Coordination of care, Documenting in the medical record, Reviewing/placing orders in the medical record (including tests, medications, and/or procedures), and Obtaining or reviewing history  .      " regular

## 2025-03-05 NOTE — ASSESSMENT & PLAN NOTE
WHO grade 1 left sphenoid wing meningioma status post resection 1/30/2024  She is now approximately 1 year status post resection of her WHO grade 1 meningioma.  She has been doing very well.  MRI was done at [facility], but review demonstrates no significant evidence of residual recurrent tumor.  This is in line with outside hospital read.    At this juncture I like to transition her to annual follow-up in accordance with NCCN guidelines.  Will repeat MRI in 1 year.  Orders:    MRI brain with and without contrast; Future

## 2025-05-05 ENCOUNTER — TELEPHONE (OUTPATIENT)
Dept: ADMINISTRATIVE | Facility: OTHER | Age: 65
End: 2025-05-05

## 2025-05-05 NOTE — TELEPHONE ENCOUNTER
----- Message from Minda CRUZ sent at 5/5/2025  8:35 AM EDT -----  Regarding: [employer] Request  05/05/25 8:35 AM    Hello, our patient Mary Ashby has had CRC: Colonoscopy completed/performed. Please assist in updating the patient chart by pulling the document from the Media Tab. The date of service is 2/5/2025.     Thank you,  Minda BENNETT

## 2025-05-05 NOTE — TELEPHONE ENCOUNTER
Upon review of the In Basket request we were able to note that no further action is required. The patient chart is up to date .  Any additional questions or concerns should be emailed to the Practice Liaisons via the appropriate education email address, please do not reply via In Basket.    Thank you  Karime Bernal MA   [facility]

## 2025-05-27 DIAGNOSIS — E03.8 OTHER SPECIFIED HYPOTHYROIDISM: ICD-10-CM

## 2025-05-27 NOTE — TELEPHONE ENCOUNTER
Reason for call:   [x] Refill   [] Prior Auth  [x] Other: Pt aware RA Closing     Office:   [x] PCP/Provider -   [] Specialty/Provider -     Medication:     levothyroxine 75 mcg  TAKE 1 TABLET (75 MCG TOTAL) BY MOUTH DAILY          [facility]: [facility]     Local Pharmacy   Does the patient have enough for 3 days?   [x] Yes   [] No - Send as HP to POD    Mail Away Pharmacy   Does the patient have enough for 10 days?   [] Yes   [] No - Send as HP to POD

## 2025-05-28 RX ORDER — LEVOTHYROXINE SODIUM 75 UG/1
75 TABLET ORAL DAILY
Qty: 90 TABLET | Refills: 1 | Status: SHIPPED | OUTPATIENT
Start: 2025-05-28

## 2025-05-30 DIAGNOSIS — I10 PRIMARY HYPERTENSION: ICD-10-CM

## 2025-05-31 RX ORDER — AMLODIPINE BESYLATE 5 MG/1
5 TABLET ORAL DAILY
Qty: 30 TABLET | Refills: 5 | Status: SHIPPED | OUTPATIENT
Start: 2025-05-31

## 2025-08-06 ENCOUNTER — OFFICE VISIT (OUTPATIENT)
Dept: FAMILY MEDICINE CLINIC | Facility: CLINIC | Age: 65
End: 2025-08-06
Payer: COMMERCIAL

## 2025-08-06 VITALS
WEIGHT: 132.4 LBS | HEIGHT: 62 IN | TEMPERATURE: 98.8 F | SYSTOLIC BLOOD PRESSURE: 132 MMHG | BODY MASS INDEX: 24.37 KG/M2 | DIASTOLIC BLOOD PRESSURE: 82 MMHG | OXYGEN SATURATION: 98 % | HEART RATE: 82 BPM

## 2025-08-06 DIAGNOSIS — I10 PRIMARY HYPERTENSION: ICD-10-CM

## 2025-08-06 DIAGNOSIS — F41.9 ANXIETY: ICD-10-CM

## 2025-08-06 DIAGNOSIS — Z12.11 SCREEN FOR COLON CANCER: ICD-10-CM

## 2025-08-06 DIAGNOSIS — E03.9 ACQUIRED HYPOTHYROIDISM: ICD-10-CM

## 2025-08-06 DIAGNOSIS — H69.93 EUSTACHIAN TUBE DYSFUNCTION, BILATERAL: Primary | ICD-10-CM

## 2025-08-06 DIAGNOSIS — D32.9 MENINGIOMA (HCC): ICD-10-CM

## 2025-08-06 DIAGNOSIS — Z13.220 SCREENING FOR LIPID DISORDERS: ICD-10-CM

## 2025-08-06 PROCEDURE — 99214 OFFICE O/P EST MOD 30 MIN: CPT | Performed by: PHYSICIAN ASSISTANT

## 2025-08-06 RX ORDER — FLUTICASONE PROPIONATE 50 MCG
2 SPRAY, SUSPENSION (ML) NASAL DAILY
Qty: 18.2 ML | Refills: 0 | Status: SHIPPED | OUTPATIENT
Start: 2025-08-06

## (undated) DEVICE — DRAPE SHEET THREE QUARTER

## (undated) DEVICE — CURITY STRETCH BANDAGE: Brand: CURITY

## (undated) DEVICE — TOOL MR8-F2/7TA23 MR8 F2/7CM TAPER 2.3MM: Brand: MIDAS REX MR8

## (undated) DEVICE — SUT PROLENE 2-0 CT-2 30 IN 8411H

## (undated) DEVICE — SPONGE SCRUB 4 PCT CHLORHEXIDINE

## (undated) DEVICE — GLOVE INDICATOR PI UNDERGLOVE SZ 8 BLUE

## (undated) DEVICE — Device: Brand: IQ SYSTEM

## (undated) DEVICE — SEALANT FIBRIN VISTASEAL 10ML

## (undated) DEVICE — DISPOSABLE SLIM BIPOLAR FORCEPS, NON-STICK,: Brand: SPETZLER-MALIS

## (undated) DEVICE — HEMOSTATIC MATRIX SURGIFLO 8ML W/THROMBIN

## (undated) DEVICE — JACKSON-PRATT 100CC BULB RESERVOIR: Brand: CARDINAL HEALTH

## (undated) DEVICE — LIGHT HANDLE COVER SLEEVE DISP BLUE STELLAR

## (undated) DEVICE — ELECTRODE BLADE MOD E-Z CLEAN 2.5IN 6.4CM -0012M

## (undated) DEVICE — ANTIBACTERIAL VIOLET BRAIDED (POLYGLACTIN 910), SYNTHETIC ABSORBABLE SUTURE: Brand: COATED VICRYL

## (undated) DEVICE — BANDAGE MT SPANDAGE SZ 9

## (undated) DEVICE — PETRI DISH STERILE

## (undated) DEVICE — TUBING SUCTION 5MM X 12 FT

## (undated) DEVICE — INTENDED FOR TISSUE SEPARATION, AND OTHER PROCEDURES THAT REQUIRE A SHARP SURGICAL BLADE TO PUNCTURE OR CUT.: Brand: BARD-PARKER SAFETY BLADES SIZE 10, STERILE

## (undated) DEVICE — PACK CRANIOTOMY PBDS RF

## (undated) DEVICE — DRAPE INTESTINAL ISOLATION BAG

## (undated) DEVICE — KERLIX BANDAGE ROLL: Brand: KERLIX

## (undated) DEVICE — CHLORAPREP HI-LITE 26ML ORANGE

## (undated) DEVICE — DRAIN JACKSON PRATT 10FR 1/8END: Brand: CARDINAL HEALTH

## (undated) DEVICE — SUT NUROLON 4-0 TF CR/8 18 IN C584D

## (undated) DEVICE — TOOL MR8-9MH30 MR8 9CM MATCH 3MM: Brand: MIDAS REX MR8

## (undated) DEVICE — 3M™ IOBAN™ 2 ANTIMICROBIAL INCISE DRAPE 6650EZ: Brand: IOBAN™ 2

## (undated) DEVICE — SUT SILK 2-0 SH CR/8 18 IN C012D

## (undated) DEVICE — INTENDED FOR TISSUE SEPARATION, AND OTHER PROCEDURES THAT REQUIRE A SHARP SURGICAL BLADE TO PUNCTURE OR CUT.: Brand: BARD-PARKER ® CARBON RIB-BACK BLADES

## (undated) DEVICE — DRESSING MEPILEX FOAM BORDER SACRUM 6.3 X 7.9IN

## (undated) DEVICE — TELFA NON-ADHERENT ABSORBENT DRESSING: Brand: TELFA

## (undated) DEVICE — OCCLUSIVE GAUZE STRIP,3% BISMUTH TRIBROMOPHENATE IN PETROLATUM BLEND: Brand: XEROFORM

## (undated) DEVICE — SURGICEL 4 X 8IN

## (undated) DEVICE — MAYFIELD® DISPOSABLE ADULT SKULL PIN (PLASTIC BASE): Brand: MAYFIELD®

## (undated) DEVICE — BETADINE OINTMENT FOIL PACK

## (undated) DEVICE — MARKER REFLECTIVE RADIOPAQUE SPHERE

## (undated) DEVICE — TRAY FOLEY 16FR URIMETER SURESTEP

## (undated) DEVICE — PROXIMATE SKIN STAPLERS (35 WIDE) CONTAINS 35 STAINLESS STEEL STAPLES (FIXED HEAD): Brand: PROXIMATE

## (undated) DEVICE — UTILITY MARKER,BLACK WITH LABELS: Brand: DEVON

## (undated) DEVICE — GLOVE SRG BIOGEL 7.5

## (undated) DEVICE — DISPOSABLE EQUIPMENT COVER: Brand: SMALL TOWEL DRAPE

## (undated) DEVICE — ADHESIVE SKIN HIGH VISCOSITY EXOFIN 1ML